# Patient Record
Sex: MALE | Race: WHITE | NOT HISPANIC OR LATINO | Employment: OTHER | URBAN - METROPOLITAN AREA
[De-identification: names, ages, dates, MRNs, and addresses within clinical notes are randomized per-mention and may not be internally consistent; named-entity substitution may affect disease eponyms.]

---

## 2019-04-11 ENCOUNTER — OFFICE VISIT (OUTPATIENT)
Dept: UROLOGY | Facility: CLINIC | Age: 79
End: 2019-04-11
Payer: MEDICARE

## 2019-04-11 ENCOUNTER — LAB VISIT (OUTPATIENT)
Dept: LAB | Facility: HOSPITAL | Age: 79
End: 2019-04-11
Attending: UROLOGY
Payer: MEDICARE

## 2019-04-11 VITALS
DIASTOLIC BLOOD PRESSURE: 77 MMHG | WEIGHT: 187.94 LBS | BODY MASS INDEX: 25.46 KG/M2 | HEART RATE: 83 BPM | TEMPERATURE: 98 F | HEIGHT: 72 IN | SYSTOLIC BLOOD PRESSURE: 132 MMHG

## 2019-04-11 DIAGNOSIS — N40.1 BPH WITH OBSTRUCTION/LOWER URINARY TRACT SYMPTOMS: Primary | ICD-10-CM

## 2019-04-11 DIAGNOSIS — Z87.442 HISTORY OF KIDNEY STONES: ICD-10-CM

## 2019-04-11 DIAGNOSIS — N40.1 BPH WITH OBSTRUCTION/LOWER URINARY TRACT SYMPTOMS: ICD-10-CM

## 2019-04-11 DIAGNOSIS — N13.8 BPH WITH OBSTRUCTION/LOWER URINARY TRACT SYMPTOMS: ICD-10-CM

## 2019-04-11 DIAGNOSIS — N13.8 BPH WITH OBSTRUCTION/LOWER URINARY TRACT SYMPTOMS: Primary | ICD-10-CM

## 2019-04-11 LAB
BILIRUB SERPL-MCNC: NEGATIVE MG/DL
BLOOD URINE, POC: ABNORMAL
COLOR, POC UA: ABNORMAL
COMPLEXED PSA SERPL-MCNC: 1.3 NG/ML (ref 0–4)
GLUCOSE UR QL STRIP: NEGATIVE
KETONES UR QL STRIP: NEGATIVE
LEUKOCYTE ESTERASE URINE, POC: ABNORMAL
NITRITE, POC UA: NEGATIVE
PH, POC UA: 7
PROTEIN, POC: NEGATIVE
SPECIFIC GRAVITY, POC UA: 1005
UROBILINOGEN, POC UA: NEGATIVE

## 2019-04-11 PROCEDURE — 81002 URINALYSIS NONAUTO W/O SCOPE: CPT | Mod: S$GLB,,, | Performed by: UROLOGY

## 2019-04-11 PROCEDURE — 99999 PR PBB SHADOW E&M-NEW PATIENT-LVL III: ICD-10-PCS | Mod: PBBFAC,,, | Performed by: UROLOGY

## 2019-04-11 PROCEDURE — 84153 ASSAY OF PSA TOTAL: CPT

## 2019-04-11 PROCEDURE — 99204 PR OFFICE/OUTPT VISIT, NEW, LEVL IV, 45-59 MIN: ICD-10-PCS | Mod: 25,S$GLB,, | Performed by: UROLOGY

## 2019-04-11 PROCEDURE — 36415 COLL VENOUS BLD VENIPUNCTURE: CPT

## 2019-04-11 PROCEDURE — 99204 OFFICE O/P NEW MOD 45 MIN: CPT | Mod: 25,S$GLB,, | Performed by: UROLOGY

## 2019-04-11 PROCEDURE — 1101F PT FALLS ASSESS-DOCD LE1/YR: CPT | Mod: CPTII,S$GLB,, | Performed by: UROLOGY

## 2019-04-11 PROCEDURE — 99999 PR PBB SHADOW E&M-NEW PATIENT-LVL III: CPT | Mod: PBBFAC,,, | Performed by: UROLOGY

## 2019-04-11 PROCEDURE — 1101F PR PT FALLS ASSESS DOC 0-1 FALLS W/OUT INJ PAST YR: ICD-10-PCS | Mod: CPTII,S$GLB,, | Performed by: UROLOGY

## 2019-04-11 PROCEDURE — 81002 POCT URINE DIPSTICK WITHOUT MICROSCOPE: ICD-10-PCS | Mod: S$GLB,,, | Performed by: UROLOGY

## 2019-04-11 RX ORDER — POTASSIUM CHLORIDE 750 MG/1
TABLET, EXTENDED RELEASE ORAL
Refills: 1 | COMMUNITY
Start: 2019-02-02 | End: 2022-10-19 | Stop reason: SDUPTHER

## 2019-04-11 RX ORDER — CARVEDILOL 3.12 MG/1
TABLET ORAL
Refills: 2 | COMMUNITY
Start: 2019-02-02 | End: 2022-10-19 | Stop reason: SDUPTHER

## 2019-04-11 RX ORDER — AMLODIPINE BESYLATE 5 MG/1
TABLET ORAL
Refills: 3 | COMMUNITY
Start: 2019-02-04

## 2019-04-11 RX ORDER — AMOXICILLIN 500 MG
CAPSULE ORAL DAILY
COMMUNITY

## 2019-04-11 RX ORDER — ASPIRIN 81 MG/1
TABLET ORAL
COMMUNITY

## 2019-04-11 RX ORDER — LISINOPRIL AND HYDROCHLOROTHIAZIDE 10; 12.5 MG/1; MG/1
TABLET ORAL
Refills: 4 | COMMUNITY
Start: 2019-03-25 | End: 2022-10-19 | Stop reason: SDUPTHER

## 2019-04-11 RX ORDER — GLUCOSAMINE/CHONDRO SU A 500-400 MG
1 TABLET ORAL 3 TIMES DAILY
COMMUNITY

## 2019-04-11 RX ORDER — SERTRALINE HYDROCHLORIDE 50 MG/1
TABLET, FILM COATED ORAL
Refills: 3 | COMMUNITY
Start: 2019-01-04 | End: 2022-10-19 | Stop reason: SDUPTHER

## 2019-04-11 RX ORDER — LOVASTATIN 20 MG/1
TABLET ORAL
Refills: 2 | COMMUNITY
Start: 2019-02-04 | End: 2022-10-19 | Stop reason: SDUPTHER

## 2019-04-11 NOTE — PROGRESS NOTES
Subjective:       Patient ID: Nino Mackay is a 79 y.o. male.    Chief Complaint:   DATE OF VISIT:  04/11/2019.    CHIEF COMPLAINT:  Recurrent urinary tract infection.    Mr. Mackay is a 79-year-old male who in the last few months has  experienced two urinary tract infections.  Once treated with Microbid with  recurrence and then he was put on Cipro.  Since then, the patient is  feeling better.  The patient has a significant past  history.  It looks  like in 2017, he underwent a transurethral resection of the prostate, was  complicated by severe bleeding.  He also has a previous history of kidney  stones.  At the present time, he is having significant lower urinary tract  symptoms with nocturia x3 to 6.  The flow is decreased.  Denies dysuria or  hematuria.  He feels that he cannot empty the bladder satisfactorily.  He  refers that after a TURP, he urinated well a couple of times, but never had  a significant change in the urine flow.  He feels in the distal part of the  penis is some type of obstruction that do not let the urine flow freely.    I did measure the postvoid residual urine and was found to be at 13 mL.    The patient's urinalysis today shows a trace of leukocytes, trace of  protein and trace of blood.  There is no PSA on record and he refers that  he does not have a PSA for quite some time.    The past medical history, the past surgical history, the current  medications and the allergies are well documented in the medical record and  all this was reviewed by me during this visit.    FAMILY HISTORY:  Negative for prostate cancer.        EOR/HN dd: 04/11/2019 13:02:54 (CDT)   td: 04/11/2019 15:10:11 (CDT)  Doc ID #[id number]   Job ID #[id number]    CC:            HPI  Review of Systems   Constitutional: Negative for activity change and appetite change.   HENT: Negative.    Eyes: Negative for discharge.   Respiratory: Negative for cough and shortness of breath.    Cardiovascular: Negative for  chest pain and palpitations.   Gastrointestinal: Negative for abdominal distention, abdominal pain, constipation and vomiting.   Genitourinary: Positive for decreased urine volume and difficulty urinating. Negative for discharge, dysuria, flank pain, frequency, hematuria, testicular pain and urgency.   Musculoskeletal: Negative for arthralgias.   Skin: Negative for rash.   Neurological: Negative for dizziness.   Psychiatric/Behavioral: The patient is not nervous/anxious.        Objective:      Physical Exam   Constitutional: He appears well-developed and well-nourished.   HENT:   Head: Normocephalic.   Eyes: Pupils are equal, round, and reactive to light.   Neck: Normal range of motion.   Cardiovascular: Normal rate.    Pulmonary/Chest: Effort normal.   Abdominal: Soft. He exhibits no distension and no mass. There is no tenderness.   Genitourinary: Rectum normal, prostate normal and penis normal. Rectal exam shows no external hemorrhoid, no mass and no tenderness. Prostate is not enlarged and not tender. Right testis shows no tenderness. Left testis shows no mass and no tenderness. No discharge found.       Musculoskeletal: Normal range of motion.   Neurological: He is alert.   Skin: Skin is warm.     Psychiatric: He has a normal mood and affect.       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 History of kidney stones        Plan:       BPH with obstruction/lower urinary tract symptoms  -     POCT URINE DIPSTICK WITHOUT MICROSCOPE  -     Prostate Specific Antigen, Diagnostic; Future; Expected date: 04/11/2019    History of kidney stones  -     X-Ray Abdomen AP 1 View; Future; Expected date: 04/11/2019    Patient to do a UFS in the next few days. He may need a cystoscopy. Await the KUB and PSA results.

## 2019-04-12 ENCOUNTER — HOSPITAL ENCOUNTER (OUTPATIENT)
Dept: RADIOLOGY | Facility: HOSPITAL | Age: 79
Discharge: HOME | End: 2019-04-12
Attending: UROLOGY
Payer: MEDICARE

## 2019-04-12 DIAGNOSIS — Z87.442 HISTORY OF KIDNEY STONES: ICD-10-CM

## 2019-04-12 DIAGNOSIS — R10.9 FLANK PAIN: ICD-10-CM

## 2019-04-12 PROCEDURE — 74018 RADEX ABDOMEN 1 VIEW: CPT | Mod: TC,PN

## 2019-04-12 PROCEDURE — 74018 RADEX ABDOMEN 1 VIEW: CPT | Mod: 26,,, | Performed by: RADIOLOGY

## 2019-04-12 PROCEDURE — 74018 XR ABDOMEN AP 1 VIEW: ICD-10-PCS | Mod: 26,,, | Performed by: RADIOLOGY

## 2019-04-17 ENCOUNTER — TELEPHONE (OUTPATIENT)
Dept: UROLOGY | Facility: CLINIC | Age: 79
End: 2019-04-17

## 2019-04-17 ENCOUNTER — HOSPITAL ENCOUNTER (OUTPATIENT)
Dept: RADIOLOGY | Facility: HOSPITAL | Age: 79
Discharge: HOME | End: 2019-04-17
Attending: UROLOGY
Payer: MEDICARE

## 2019-04-17 DIAGNOSIS — R10.9 FLANK PAIN: ICD-10-CM

## 2019-04-17 PROCEDURE — 74176 CT ABDOMEN PELVIS WITHOUT CONTRAST: ICD-10-PCS | Mod: 26,,, | Performed by: RADIOLOGY

## 2019-04-17 PROCEDURE — 74176 CT ABD & PELVIS W/O CONTRAST: CPT | Mod: TC,PN

## 2019-04-17 PROCEDURE — 74176 CT ABD & PELVIS W/O CONTRAST: CPT | Mod: 26,,, | Performed by: RADIOLOGY

## 2019-04-17 NOTE — TELEPHONE ENCOUNTER
Confirmed appt for 11 tomorrow for uroflow study with the pt. Instructed him to come with a full bladder. PT verbalized understanding

## 2019-04-17 NOTE — TELEPHONE ENCOUNTER
----- Message from Henry Vazquez sent at 4/17/2019  2:56 PM CDT -----  Contact: Patient  Type: Needs Medical Advice    Who Called:  Patient  Best Call Back Number: [phone]  Additional Information: Patient would like to discuss upcoming appointment. Please call to advise. Thanks!

## 2019-04-18 ENCOUNTER — CLINICAL SUPPORT (OUTPATIENT)
Dept: UROLOGY | Facility: CLINIC | Age: 79
End: 2019-04-18
Payer: MEDICARE

## 2019-04-18 DIAGNOSIS — N40.1 BPH WITH OBSTRUCTION/LOWER URINARY TRACT SYMPTOMS: Primary | ICD-10-CM

## 2019-04-18 DIAGNOSIS — N13.8 BPH WITH OBSTRUCTION/LOWER URINARY TRACT SYMPTOMS: Primary | ICD-10-CM

## 2019-04-18 NOTE — PROGRESS NOTES
Instructed pt to urinate into uroflow machine. Informed him we will call him with results. Pt verbalized understanding.

## 2019-05-03 ENCOUNTER — OFFICE VISIT (OUTPATIENT)
Dept: UROLOGY | Facility: CLINIC | Age: 79
End: 2019-05-03
Payer: MEDICARE

## 2019-05-03 VITALS
DIASTOLIC BLOOD PRESSURE: 82 MMHG | RESPIRATION RATE: 16 BRPM | TEMPERATURE: 98 F | HEIGHT: 72 IN | HEART RATE: 64 BPM | SYSTOLIC BLOOD PRESSURE: 134 MMHG | WEIGHT: 186.31 LBS | BODY MASS INDEX: 25.24 KG/M2

## 2019-05-03 DIAGNOSIS — N20.0 KIDNEY STONE ON LEFT SIDE: ICD-10-CM

## 2019-05-03 PROCEDURE — 99214 OFFICE O/P EST MOD 30 MIN: CPT | Mod: S$GLB,,, | Performed by: UROLOGY

## 2019-05-03 PROCEDURE — 1101F PR PT FALLS ASSESS DOC 0-1 FALLS W/OUT INJ PAST YR: ICD-10-PCS | Mod: CPTII,S$GLB,, | Performed by: UROLOGY

## 2019-05-03 PROCEDURE — 99999 PR PBB SHADOW E&M-EST. PATIENT-LVL IV: CPT | Mod: PBBFAC,,, | Performed by: UROLOGY

## 2019-05-03 PROCEDURE — 1101F PT FALLS ASSESS-DOCD LE1/YR: CPT | Mod: CPTII,S$GLB,, | Performed by: UROLOGY

## 2019-05-03 PROCEDURE — 99214 PR OFFICE/OUTPT VISIT, EST, LEVL IV, 30-39 MIN: ICD-10-PCS | Mod: S$GLB,,, | Performed by: UROLOGY

## 2019-05-03 PROCEDURE — 99999 PR PBB SHADOW E&M-EST. PATIENT-LVL IV: ICD-10-PCS | Mod: PBBFAC,,, | Performed by: UROLOGY

## 2019-05-06 PROBLEM — N20.0 KIDNEY STONE ON LEFT SIDE: Status: ACTIVE | Noted: 2019-05-06

## 2019-05-06 NOTE — PROGRESS NOTES
Subjective:       Patient ID: Nino Mackay is a 79 y.o. male.    Chief Complaint:   DATE OF VISIT:  05/03/2019    CHIEF COMPLAINT:  History of recurrent urinary tract infection and left  kidney stone.    Mr. Mackay was initially seen on 04/11/2019 with a history of recurrent  urinary tract infections.  At that time, the patient was recommended to  undergo AP of the abdomen.  X-ray that shows a stone, then he underwent a  CT scan with no contrast that again shows a stone in the left kidney of  approximately 9 mm in the maximum diameter.  The patient is here for  discussion of possible treatment of the stone, condition that he is  experiencing.  Despite this, the patient is not having any pain.  This can  be the source of the recurrent urinary tract infections and the patient is  interested in undergoing a treatment for the condition.  I went ahead and  suggested that we proceed with an ESWL.  The stone is not significantly  dense, can be seen in the AP KUB, is seen better in the CAT scan.  I  explained to the patient that we have a little difficulty in the  localization of the stone, but with the AP and oblique views that we used  during lithotripsy, we should be able to localize the stone and treat him  properly.  The rationale, the risk, and possible complications of the  treatment have been fully explained to the patient and he agreed for it.   All the questions were answered to his satisfaction and he has been  scheduled to undergo this procedure on 05/22/2019.  It is to be noted that  today's urinalysis is completely clear.  The last PSA on 04/11/2019 was  1.3.  Written information on lithotripsy has been provided to the patient  today.        EOR/HN dd: 05/06/2019 09:59:59 (CDT)   td: 05/06/2019 21:36:43 (CDT)  Doc ID #[id number]   Job ID #[id number]    CC:            HPI  Review of Systems   Constitutional: Negative for activity change and appetite change.   HENT: Negative.    Eyes: Negative for  discharge.   Respiratory: Negative for cough and shortness of breath.    Cardiovascular: Negative for chest pain and palpitations.   Gastrointestinal: Negative for abdominal distention, abdominal pain, constipation and vomiting.   Genitourinary: Positive for frequency. Negative for discharge, dysuria, flank pain, hematuria, testicular pain and urgency.   Musculoskeletal: Positive for arthralgias.   Skin: Negative for rash.   Neurological: Negative for dizziness.   Psychiatric/Behavioral: The patient is not nervous/anxious.        Objective:      Physical Exam   Constitutional: He appears well-developed and well-nourished.   HENT:   Head: Normocephalic.   Eyes: Pupils are equal, round, and reactive to light.   Neck: Normal range of motion.   Cardiovascular: Normal rate.    Pulmonary/Chest: Effort normal.   Abdominal: Soft. He exhibits no distension and no mass. There is no tenderness.   Genitourinary: Penis normal. Right testis shows no mass and no tenderness. Left testis shows no mass and no tenderness. No discharge found.   Musculoskeletal: Normal range of motion.   Neurological: He is alert.   Skin: Skin is warm.     Psychiatric: He has a normal mood and affect.       Assessment:       1. Kidney stone on left side        Plan:       Kidney stone on left side  -     Case Request Operating Room: LITHOTRIPSY, ESWL    For left ESWL on 5/22/19/ 9 mm stone left kidney

## 2019-05-10 ENCOUNTER — TELEPHONE (OUTPATIENT)
Dept: UROLOGY | Facility: CLINIC | Age: 79
End: 2019-05-10

## 2019-05-20 ENCOUNTER — HOSPITAL ENCOUNTER (OUTPATIENT)
Dept: PREADMISSION TESTING | Facility: HOSPITAL | Age: 79
Discharge: HOME | End: 2019-05-20
Attending: UROLOGY
Payer: MEDICARE

## 2019-05-27 ENCOUNTER — ANESTHESIA EVENT (OUTPATIENT)
Dept: SURGERY | Facility: HOSPITAL | Age: 79
End: 2019-05-27
Payer: MEDICARE

## 2019-05-27 ENCOUNTER — HOSPITAL ENCOUNTER (OUTPATIENT)
Dept: PREADMISSION TESTING | Facility: HOSPITAL | Age: 79
Discharge: HOME | End: 2019-05-27
Attending: UROLOGY
Payer: MEDICARE

## 2019-05-27 PROCEDURE — 99900103 DSU ONLY-NO CHARGE-INITIAL HR (STAT)

## 2019-05-27 NOTE — ANESTHESIA PREPROCEDURE EVALUATION
05/27/2019  Nino Mackay is a 79 y.o., male.    Pre-op Assessment    I have reviewed the Patient Summary Reports.    I have reviewed the Nursing Notes.   I have reviewed the Medications.     Review of Systems  Anesthesia Hx:  No problems with previous Anesthesia  Neg history of prior surgery. Denies Family Hx of Anesthesia complications.   Denies Personal Hx of Anesthesia complications.   Social:  Smoker    Hematology/Oncology:  Hematology Normal   Oncology Normal     EENT/Dental:EENT/Dental Normal   Cardiovascular:   Hypertension, well controlled    Pulmonary:  Pulmonary Normal    Renal/:   renal calculi    Hepatic/GI:  Hepatic/GI Normal    Musculoskeletal:  Musculoskeletal Normal    Neurological:  Neurology Normal    Endocrine:  Endocrine Normal    Dermatological:  Skin Normal    Psych:  Psychiatric Normal           Physical Exam  General:  Well nourished    Airway/Jaw/Neck:  AIRWAY FINDINGS: Normal      Eyes/Ears/Nose:  EYES/EARS/NOSE FINDINGS: Normal   Dental:  DENTAL FINDINGS: Normal   Chest/Lungs:  Chest/Lungs Clear    Heart/Vascular:  Heart Findings: Normal Heart murmur: negative Vascular Findings: Normal    Abdomen:  Abdomen Findings: Normal    Musculoskeletal:  Musculoskeletal Findings: Normal   Skin:  Skin Findings: Normal         Anesthesia Plan  Type of Anesthesia, risks & benefits discussed:  Anesthesia Type:  general  Patient's Preference:   Intra-op Monitoring Plan: standard ASA monitors  Intra-op Monitoring Plan Comments:   Post Op Pain Control Plan:   Post Op Pain Control Plan Comments:   Induction:   IV  Beta Blocker:  Patient is not currently on a Beta-Blocker (No further documentation required).       Informed Consent: Patient understands risks and agrees with Anesthesia plan.  Questions answered. Anesthesia consent signed with patient.  ASA Score: 2     Day of Surgery Review  of History & Physical:    H&P update referred to the provider.

## 2019-05-29 ENCOUNTER — ANESTHESIA (OUTPATIENT)
Dept: SURGERY | Facility: HOSPITAL | Age: 79
End: 2019-05-29
Payer: MEDICARE

## 2019-05-29 ENCOUNTER — HOSPITAL ENCOUNTER (OUTPATIENT)
Facility: HOSPITAL | Age: 79
Discharge: HOME | End: 2019-05-29
Attending: UROLOGY | Admitting: UROLOGY
Payer: MEDICARE

## 2019-05-29 ENCOUNTER — HOSPITAL ENCOUNTER (OUTPATIENT)
Dept: RADIOLOGY | Facility: HOSPITAL | Age: 79
Discharge: HOME | End: 2019-05-29
Attending: UROLOGY | Admitting: UROLOGY
Payer: MEDICARE

## 2019-05-29 VITALS
TEMPERATURE: 98 F | DIASTOLIC BLOOD PRESSURE: 75 MMHG | RESPIRATION RATE: 13 BRPM | HEART RATE: 72 BPM | SYSTOLIC BLOOD PRESSURE: 136 MMHG | HEIGHT: 72 IN | OXYGEN SATURATION: 96 % | WEIGHT: 186 LBS | BODY MASS INDEX: 25.19 KG/M2

## 2019-05-29 DIAGNOSIS — N20.0 KIDNEY STONE ON LEFT SIDE: ICD-10-CM

## 2019-05-29 DIAGNOSIS — Z01.818 PRE-OP EXAM: ICD-10-CM

## 2019-05-29 LAB
ANION GAP SERPL CALC-SCNC: 9 MMOL/L (ref 8–16)
BASOPHILS # BLD AUTO: 0.06 K/UL (ref 0–0.2)
BASOPHILS NFR BLD: 0.8 % (ref 0–1.9)
BUN SERPL-MCNC: 19 MG/DL (ref 8–23)
CALCIUM SERPL-MCNC: 8.5 MG/DL (ref 8.7–10.5)
CHLORIDE SERPL-SCNC: 102 MMOL/L (ref 95–110)
CO2 SERPL-SCNC: 27 MMOL/L (ref 23–29)
CREAT SERPL-MCNC: 1.2 MG/DL (ref 0.5–1.4)
DIFFERENTIAL METHOD: ABNORMAL
EOSINOPHIL # BLD AUTO: 0.2 K/UL (ref 0–0.5)
EOSINOPHIL NFR BLD: 3.3 % (ref 0–8)
ERYTHROCYTE [DISTWIDTH] IN BLOOD BY AUTOMATED COUNT: 12.4 % (ref 11.5–14.5)
EST. GFR  (AFRICAN AMERICAN): >60 ML/MIN/1.73 M^2
EST. GFR  (NON AFRICAN AMERICAN): 57.2 ML/MIN/1.73 M^2
GLUCOSE SERPL-MCNC: 124 MG/DL (ref 70–110)
HCT VFR BLD AUTO: 41.2 % (ref 40–54)
HGB BLD-MCNC: 13.7 G/DL (ref 14–18)
IMM GRANULOCYTES # BLD AUTO: 0.02 K/UL (ref 0–0.04)
IMM GRANULOCYTES NFR BLD AUTO: 0.3 % (ref 0–0.5)
LYMPHOCYTES # BLD AUTO: 2.1 K/UL (ref 1–4.8)
LYMPHOCYTES NFR BLD: 28.6 % (ref 18–48)
MCH RBC QN AUTO: 31.6 PG (ref 27–31)
MCHC RBC AUTO-ENTMCNC: 33.3 G/DL (ref 32–36)
MCV RBC AUTO: 95 FL (ref 82–98)
MONOCYTES # BLD AUTO: 0.8 K/UL (ref 0.3–1)
MONOCYTES NFR BLD: 10.9 % (ref 4–15)
NEUTROPHILS # BLD AUTO: 4.1 K/UL (ref 1.8–7.7)
NEUTROPHILS NFR BLD: 56.1 % (ref 38–73)
NRBC BLD-RTO: 0 /100 WBC
PLATELET # BLD AUTO: 288 K/UL (ref 150–350)
PMV BLD AUTO: 8.2 FL (ref 9.2–12.9)
POTASSIUM SERPL-SCNC: 3.9 MMOL/L (ref 3.5–5.1)
RBC # BLD AUTO: 4.34 M/UL (ref 4.6–6.2)
SODIUM SERPL-SCNC: 138 MMOL/L (ref 136–145)
WBC # BLD AUTO: 7.31 K/UL (ref 3.9–12.7)

## 2019-05-29 PROCEDURE — D9220A PRA ANESTHESIA: ICD-10-PCS | Mod: ANES,,, | Performed by: ANESTHESIOLOGY

## 2019-05-29 PROCEDURE — 71000033 HC RECOVERY, INTIAL HOUR: Performed by: UROLOGY

## 2019-05-29 PROCEDURE — 74018 XR ABDOMEN AP 1 VIEW: ICD-10-PCS | Mod: 26,,, | Performed by: RADIOLOGY

## 2019-05-29 PROCEDURE — 25000003 PHARM REV CODE 250: Performed by: NURSE ANESTHETIST, CERTIFIED REGISTERED

## 2019-05-29 PROCEDURE — 93005 ELECTROCARDIOGRAM TRACING: CPT

## 2019-05-29 PROCEDURE — 71000015 HC POSTOP RECOV 1ST HR: Performed by: UROLOGY

## 2019-05-29 PROCEDURE — 80048 BASIC METABOLIC PNL TOTAL CA: CPT

## 2019-05-29 PROCEDURE — 50590 FRAGMENTING OF KIDNEY STONE: CPT | Mod: LT,,, | Performed by: UROLOGY

## 2019-05-29 PROCEDURE — 74018 RADEX ABDOMEN 1 VIEW: CPT | Mod: TC,FY

## 2019-05-29 PROCEDURE — 36415 COLL VENOUS BLD VENIPUNCTURE: CPT

## 2019-05-29 PROCEDURE — 37000009 HC ANESTHESIA EA ADD 15 MINS: Performed by: UROLOGY

## 2019-05-29 PROCEDURE — 37000008 HC ANESTHESIA 1ST 15 MINUTES: Performed by: UROLOGY

## 2019-05-29 PROCEDURE — D9220A PRA ANESTHESIA: Mod: CRNA,,, | Performed by: NURSE ANESTHETIST, CERTIFIED REGISTERED

## 2019-05-29 PROCEDURE — 74018 RADEX ABDOMEN 1 VIEW: CPT | Mod: 26,,, | Performed by: RADIOLOGY

## 2019-05-29 PROCEDURE — D9220A PRA ANESTHESIA: Mod: ANES,,, | Performed by: ANESTHESIOLOGY

## 2019-05-29 PROCEDURE — 85025 COMPLETE CBC W/AUTO DIFF WBC: CPT

## 2019-05-29 PROCEDURE — 36000704 HC OR TIME LEV I 1ST 15 MIN: Performed by: UROLOGY

## 2019-05-29 PROCEDURE — D9220A PRA ANESTHESIA: ICD-10-PCS | Mod: CRNA,,, | Performed by: NURSE ANESTHETIST, CERTIFIED REGISTERED

## 2019-05-29 PROCEDURE — 36000705 HC OR TIME LEV I EA ADD 15 MIN: Performed by: UROLOGY

## 2019-05-29 PROCEDURE — 25000003 PHARM REV CODE 250: Performed by: ANESTHESIOLOGY

## 2019-05-29 PROCEDURE — 50590 PR FRAGMENT KIDNEY STONE/ ESWL: ICD-10-PCS | Mod: LT,,, | Performed by: UROLOGY

## 2019-05-29 PROCEDURE — 63600175 PHARM REV CODE 636 W HCPCS: Performed by: NURSE ANESTHETIST, CERTIFIED REGISTERED

## 2019-05-29 PROCEDURE — 63600175 PHARM REV CODE 636 W HCPCS: Performed by: UROLOGY

## 2019-05-29 RX ORDER — FENTANYL CITRATE 50 UG/ML
INJECTION, SOLUTION INTRAMUSCULAR; INTRAVENOUS
Status: DISCONTINUED | OUTPATIENT
Start: 2019-05-29 | End: 2019-05-29

## 2019-05-29 RX ORDER — CEPHALEXIN 500 MG/1
500 CAPSULE ORAL EVERY 12 HOURS
Qty: 10 CAPSULE | Refills: 0 | Status: SHIPPED | OUTPATIENT
Start: 2019-05-29 | End: 2019-06-03

## 2019-05-29 RX ORDER — SODIUM CHLORIDE, SODIUM LACTATE, POTASSIUM CHLORIDE, CALCIUM CHLORIDE 600; 310; 30; 20 MG/100ML; MG/100ML; MG/100ML; MG/100ML
INJECTION, SOLUTION INTRAVENOUS CONTINUOUS
Status: DISCONTINUED | OUTPATIENT
Start: 2019-05-29 | End: 2019-05-29 | Stop reason: HOSPADM

## 2019-05-29 RX ORDER — OXYCODONE AND ACETAMINOPHEN 5; 325 MG/1; MG/1
1 TABLET ORAL
Status: DISCONTINUED | OUTPATIENT
Start: 2019-05-29 | End: 2019-05-29 | Stop reason: HOSPADM

## 2019-05-29 RX ORDER — ONDANSETRON 2 MG/ML
4 INJECTION INTRAMUSCULAR; INTRAVENOUS DAILY PRN
Status: DISCONTINUED | OUTPATIENT
Start: 2019-05-29 | End: 2019-05-29 | Stop reason: HOSPADM

## 2019-05-29 RX ORDER — CEFAZOLIN SODIUM 2 G/50ML
2 SOLUTION INTRAVENOUS
Status: COMPLETED | OUTPATIENT
Start: 2019-05-29 | End: 2019-05-29

## 2019-05-29 RX ORDER — KETOROLAC TROMETHAMINE 30 MG/ML
15 INJECTION, SOLUTION INTRAMUSCULAR; INTRAVENOUS ONCE
Status: DISCONTINUED | OUTPATIENT
Start: 2019-05-29 | End: 2019-05-29 | Stop reason: HOSPADM

## 2019-05-29 RX ORDER — MIDAZOLAM HYDROCHLORIDE 1 MG/ML
INJECTION, SOLUTION INTRAMUSCULAR; INTRAVENOUS
Status: DISCONTINUED | OUTPATIENT
Start: 2019-05-29 | End: 2019-05-29

## 2019-05-29 RX ORDER — SODIUM CHLORIDE, SODIUM LACTATE, POTASSIUM CHLORIDE, CALCIUM CHLORIDE 600; 310; 30; 20 MG/100ML; MG/100ML; MG/100ML; MG/100ML
INJECTION, SOLUTION INTRAVENOUS CONTINUOUS PRN
Status: DISCONTINUED | OUTPATIENT
Start: 2019-05-29 | End: 2019-05-29

## 2019-05-29 RX ORDER — MORPHINE SULFATE 4 MG/ML
2 INJECTION, SOLUTION INTRAMUSCULAR; INTRAVENOUS EVERY 5 MIN PRN
Status: DISCONTINUED | OUTPATIENT
Start: 2019-05-29 | End: 2019-05-29 | Stop reason: HOSPADM

## 2019-05-29 RX ORDER — TRAMADOL HYDROCHLORIDE AND ACETAMINOPHEN 37.5; 325 MG/1; MG/1
1 TABLET, FILM COATED ORAL EVERY 6 HOURS PRN
Qty: 15 TABLET | Refills: 0 | Status: SHIPPED | OUTPATIENT
Start: 2019-05-29 | End: 2022-10-19

## 2019-05-29 RX ORDER — PROPOFOL 10 MG/ML
VIAL (ML) INTRAVENOUS
Status: DISCONTINUED | OUTPATIENT
Start: 2019-05-29 | End: 2019-05-29

## 2019-05-29 RX ORDER — EPHEDRINE SULFATE 50 MG/ML
INJECTION, SOLUTION INTRAVENOUS
Status: DISCONTINUED | OUTPATIENT
Start: 2019-05-29 | End: 2019-05-29

## 2019-05-29 RX ORDER — GLYCOPYRROLATE 0.2 MG/ML
INJECTION INTRAMUSCULAR; INTRAVENOUS
Status: DISCONTINUED | OUTPATIENT
Start: 2019-05-29 | End: 2019-05-29

## 2019-05-29 RX ORDER — SODIUM CHLORIDE, SODIUM LACTATE, POTASSIUM CHLORIDE, CALCIUM CHLORIDE 600; 310; 30; 20 MG/100ML; MG/100ML; MG/100ML; MG/100ML
125 INJECTION, SOLUTION INTRAVENOUS CONTINUOUS
Status: DISCONTINUED | OUTPATIENT
Start: 2019-05-29 | End: 2019-05-29 | Stop reason: HOSPADM

## 2019-05-29 RX ORDER — LIDOCAINE HYDROCHLORIDE 10 MG/ML
1 INJECTION, SOLUTION EPIDURAL; INFILTRATION; INTRACAUDAL; PERINEURAL ONCE
Status: DISCONTINUED | OUTPATIENT
Start: 2019-05-29 | End: 2019-05-29 | Stop reason: HOSPADM

## 2019-05-29 RX ORDER — ONDANSETRON 2 MG/ML
INJECTION INTRAMUSCULAR; INTRAVENOUS
Status: DISCONTINUED | OUTPATIENT
Start: 2019-05-29 | End: 2019-05-29

## 2019-05-29 RX ORDER — TAMSULOSIN HYDROCHLORIDE 0.4 MG/1
0.4 CAPSULE ORAL DAILY
Qty: 30 CAPSULE | Refills: 11 | Status: SHIPPED | OUTPATIENT
Start: 2019-05-29 | End: 2020-06-01

## 2019-05-29 RX ADMIN — SODIUM CHLORIDE, POTASSIUM CHLORIDE, SODIUM LACTATE AND CALCIUM CHLORIDE: 600; 310; 30; 20 INJECTION, SOLUTION INTRAVENOUS at 07:05

## 2019-05-29 RX ADMIN — GLYCOPYRROLATE 0.2 MG: 0.2 INJECTION INTRAMUSCULAR; INTRAVENOUS at 07:05

## 2019-05-29 RX ADMIN — PROPOFOL 200 MG: 10 INJECTION, EMULSION INTRAVENOUS at 07:05

## 2019-05-29 RX ADMIN — MIDAZOLAM HYDROCHLORIDE 2 MG: 1 INJECTION, SOLUTION INTRAMUSCULAR; INTRAVENOUS at 07:05

## 2019-05-29 RX ADMIN — EPHEDRINE SULFATE 10 MG: 50 INJECTION INTRAMUSCULAR; INTRAVENOUS; SUBCUTANEOUS at 07:05

## 2019-05-29 RX ADMIN — EPHEDRINE SULFATE 15 MG: 50 INJECTION INTRAMUSCULAR; INTRAVENOUS; SUBCUTANEOUS at 07:05

## 2019-05-29 RX ADMIN — EPHEDRINE SULFATE 25 MG: 50 INJECTION INTRAMUSCULAR; INTRAVENOUS; SUBCUTANEOUS at 07:05

## 2019-05-29 RX ADMIN — ONDANSETRON 4 MG: 2 INJECTION INTRAMUSCULAR; INTRAVENOUS at 07:05

## 2019-05-29 RX ADMIN — CEFAZOLIN SODIUM 2 G: 2 SOLUTION INTRAVENOUS at 07:05

## 2019-05-29 RX ADMIN — FENTANYL CITRATE 50 MCG: 50 INJECTION, SOLUTION INTRAMUSCULAR; INTRAVENOUS at 07:05

## 2019-05-29 NOTE — TRANSFER OF CARE
Anesthesia Transfer of Care Note    Patient: Nino Mackay    Procedure(s) Performed: Procedure(s) (LRB):  LITHOTRIPSY, ESWL (Left)    Patient location: PACU    Anesthesia Type: general    Transport from OR: Transported from OR on room air with adequate spontaneous ventilation    Post pain: adequate analgesia    Post assessment: no apparent anesthetic complications and tolerated procedure well    Post vital signs: stable    Level of consciousness: sedated and responds to stimulation    Nausea/Vomiting: no nausea/vomiting    Complications: none    Transfer of care protocol was followed      Last vitals:   Visit Vitals  /72 (BP Location: Left arm, Patient Position: Lying)   Pulse 70   Temp 36.7 °C (98.1 °F) (Oral)   Resp 18   Ht 6' (1.829 m)   Wt 84.4 kg (186 lb)   SpO2 97%   BMI 25.23 kg/m²

## 2019-05-29 NOTE — OP NOTE
Operative  Note    DATE OF PROCEDURE: 5/29/2019    SURGEON: Kobi Troncoso M.D.     PREOPERATIVE DIAGNOSIS: Urolithiasis    POSTOPERATIVE DIAGNOSIS: same     PROCEDURE: Extracorporeal shockwave lithotripsy    ANESTHESIA: General     COMPLICATIONS: None.     ESTIMATED BLOOD LOSS: Minimal.     SPECIMENS: None.     DRAINS: None.     INDICATIONS:  Nino Mackay is a  79 y.o.  male with urolithiasis and he is here for treatment of that condition. A decision was made to go ahead and   proceed with operative intervention. Risks and complications including rate of   recurrence discussed in advance.     PROCEDURE IN DETAIL:    The patient was brought to the lithotripsy treatment room and placed on the Tepha treatment table where the patient'sLeftflank was over the water bag. After adequate induction of general anesthesia, plain and AP fluoroscopic images were obtained of the Leftrenal area and 9 mm calculus was visualized over the level of the Left kidney.The calculus was positioned at the F2 point. Shock waves were administered at the rate of 120 shocks per minute using the automatic pacing mechanism. This was initially started at 16 kV and then increased to 26 kV and this was tolerated well throughout the procedure. Periodic repeat fluoroscopic images both AP and oblique were obtained of the Left kidney and when necessary, repositioning was carried out. The repeat fluoroscopic images did reveal satisfactory fragmentation of the calculus. A total of 3000 shocks were administered. Having tolerated the procedure well he was transferred to the recovery room in stable condition.

## 2019-05-29 NOTE — H&P
CHIEF COMPLAINT:  History of recurrent urinary tract infection and left  kidney stone.     Mr. Mackay was initially seen on 04/11/2019 with a history of recurrent  urinary tract infections.  At that time, the patient was recommended to  undergo AP of the abdomen.  X-ray that shows a stone, then he underwent a  CT scan with no contrast that again shows a stone in the left kidney of  approximately 9 mm in the maximum diameter.  The patient is here for  discussion of possible treatment of the stone, condition that he is  experiencing.  Despite this, the patient is not having any pain.  This can  be the source of the recurrent urinary tract infections and the patient is  interested in undergoing a treatment for the condition.  I went ahead and  suggested that we proceed with an ESWL.  The stone is not significantly  dense, can be seen in the AP KUB, is seen better in the CAT scan.  I  explained to the patient that we have a little difficulty in the  localization of the stone, but with the AP and oblique views that we used  during lithotripsy, we should be able to localize the stone and treat him  properly.  The rationale, the risk, and possible complications of the  treatment have been fully explained to the patient and he agreed for it.   All the questions were answered to his satisfaction and he has been  scheduled to undergo this procedure on 05/22/2019.  It is to be noted that  today's urinalysis is completely clear.  The last PSA on 04/11/2019 was  1.3.  Written information on lithotripsy has been provided to the patient  today.             Review of Systems   Constitutional: Negative for activity change and appetite change.   HENT: Negative.    Eyes: Negative for discharge.   Respiratory: Negative for cough and shortness of breath.    Cardiovascular: Negative for chest pain and palpitations.   Gastrointestinal: Negative for abdominal distention, abdominal pain, constipation and vomiting.   Genitourinary:  Positive for frequency. Negative for discharge, dysuria, flank pain, hematuria, testicular pain and urgency.   Musculoskeletal: Positive for arthralgias.   Skin: Negative for rash.   Neurological: Negative for dizziness.   Psychiatric/Behavioral: The patient is not nervous/anxious.        Objective:   Physical Exam   Constitutional: He appears well-developed and well-nourished.   HENT:   Head: Normocephalic.   Eyes: Pupils are equal, round, and reactive to light.   Neck: Normal range of motion.   Cardiovascular: Normal rate.    Pulmonary/Chest: Effort normal.   Abdominal: Soft. He exhibits no distension and no mass. There is no tenderness.   Genitourinary: Penis normal. Right testis shows no mass and no tenderness. Left testis shows no mass and no tenderness. No discharge found.   Musculoskeletal: Normal range of motion.   Neurological: He is alert.   Skin: Skin is warm.     Psychiatric: He has a normal mood and affect.       Assessment:       1. Kidney stone on left side        Plan:       Kidney stone on left side  Left ESWL

## 2019-05-29 NOTE — BRIEF OP NOTE
Brief Operative Note     Surgery Date: 5/29/2019    Surgeon:   Kobi Troncoso MD     Pre-op Diagnosis:  Kidney stone on left side [N20.0]  Kidney stone on left side [N20.0]    Post-op Diagnosis:  Same    Procedure:  Procedure(s) (LRB):  LITHOTRIPSY, ESWL (Left)    Anesthesia: General    Findings/Key Components:  9 mm stone on the left    Estimated Blood Loss: Minimal                                                                                                                           Discharge Summary    Admit Date: 5/29/2019     Attending Physician: Kobi Troncoso MD     Discharge Physician: Kobi Troncoso MD      Discharge Date: 5/29/2019    Treatments/Procedures: Procedure(s) (LRB):  LITHOTRIPSY, ESWL (Left)  Final Diagnosis:Left kidney stone  Hospital Course: Left ESWL done as planned  F.ULuisito Troncoso 2 weeks. Start on Flomax    Disposition: Home or Self Care     Patient Instructions:     Current Discharge Medication List:         Nino Mackay   Home Medication Instructions HIGINIO:[id number]    Printed on:05/29/19 7075   Medication Information                      amLODIPine (NORVASC) 5 MG tablet  TK 1 T PO D             aspirin (ECOTRIN) 81 MG EC tablet  aspirin 81 mg tablet,delayed release   Take 1 tablet every day by oral route.             carvedilol (COREG) 3.125 MG tablet  TK 1 T PO D             cephALEXin (KEFLEX) 500 MG capsule  Take 1 capsule (500 mg total) by mouth every 12 (twelve) hours. for 10 doses             fish oil-omega-3 fatty acids 300-1,000 mg capsule  Take by mouth once daily.             glucosamine-chondroitin 500-400 mg tablet  Take 1 tablet by mouth 3 (three) times daily.             lisinopril-hydrochlorothiazide (PRINZIDE,ZESTORETIC) 10-12.5 mg per tablet               lovastatin (MEVACOR) 20 MG tablet  TK 1 T PO HS             potassium chloride (KLOR-CON) 10 MEQ TbSR  TK 2 TS PO BID             sertraline (ZOLOFT) 50 MG tablet  TK 1 T PO D              tamsulosin (FLOMAX) 0.4 mg Cap  Take 1 capsule (0.4 mg total) by mouth once daily.             tramadol-acetaminophen 37.5-325 mg (ULTRACET) 37.5-325 mg Tab  Take 1 tablet by mouth every 6 (six) hours as needed.                     Current Discharge Medication List      START taking these medications    Details   cephALEXin (KEFLEX) 500 MG capsule Take 1 capsule (500 mg total) by mouth every 12 (twelve) hours. for 10 doses  Qty: 10 capsule, Refills: 0      tamsulosin (FLOMAX) 0.4 mg Cap Take 1 capsule (0.4 mg total) by mouth once daily.  Qty: 30 capsule, Refills: 11      tramadol-acetaminophen 37.5-325 mg (ULTRACET) 37.5-325 mg Tab Take 1 tablet by mouth every 6 (six) hours as needed.  Qty: 15 tablet, Refills: 0         CONTINUE these medications which have NOT CHANGED    Details   amLODIPine (NORVASC) 5 MG tablet TK 1 T PO D  Refills: 3      carvedilol (COREG) 3.125 MG tablet TK 1 T PO D  Refills: 2      fish oil-omega-3 fatty acids 300-1,000 mg capsule Take by mouth once daily.      glucosamine-chondroitin 500-400 mg tablet Take 1 tablet by mouth 3 (three) times daily.      lisinopril-hydrochlorothiazide (PRINZIDE,ZESTORETIC) 10-12.5 mg per tablet Refills: 4      lovastatin (MEVACOR) 20 MG tablet TK 1 T PO HS  Refills: 2      potassium chloride (KLOR-CON) 10 MEQ TbSR TK 2 TS PO BID  Refills: 1      sertraline (ZOLOFT) 50 MG tablet TK 1 T PO D  Refills: 3      aspirin (ECOTRIN) 81 MG EC tablet aspirin 81 mg tablet,delayed release   Take 1 tablet every day by oral route.             Discharge Procedure Orders:    Discharge Procedure Orders   Diet Adult Regular     Activity as tolerated

## 2019-05-30 NOTE — ANESTHESIA POSTPROCEDURE EVALUATION
Anesthesia Post Evaluation    Patient: Nino Mackay    Procedure(s) Performed: Procedure(s) (LRB):  LITHOTRIPSY, ESWL (Left)    Final Anesthesia Type: general  Patient location during evaluation: PACU  Patient participation: Yes- Able to Participate  Level of consciousness: awake and awake and alert  Post-procedure vital signs: reviewed and stable  Pain management: adequate  Airway patency: patent  PONV status at discharge: No PONV  Anesthetic complications: no      Cardiovascular status: blood pressure returned to baseline  Respiratory status: unassisted and spontaneous ventilation  Hydration status: euvolemic  Follow-up not needed.          Vitals Value Taken Time   /75 5/29/2019  9:15 AM   Temp 36.7 °C (98.1 °F) 5/29/2019  6:44 AM   Pulse 72 5/29/2019  9:15 AM   Resp 13 5/29/2019  9:15 AM   SpO2 96 % 5/29/2019  9:15 AM         Event Time     Out of Recovery 09:00:00          Pain/Sammi Score: Sammi Score: 10 (5/29/2019  9:00 AM)  Modified Sammi Score: 20 (5/29/2019  9:15 AM)

## 2019-05-31 ENCOUNTER — TELEPHONE (OUTPATIENT)
Dept: UROLOGY | Facility: CLINIC | Age: 79
End: 2019-05-31

## 2019-05-31 NOTE — TELEPHONE ENCOUNTER
----- Message from Rekha Howe sent at 5/31/2019 12:36 PM CDT -----  Contact: Self  Patient had a procedure on Wed 5/29 and needs a post op appt on or around 6/11., but he can not come in on Wednesdays.  I could not schedule him until July.   Please call back at [phone] (home).  Thanks

## 2019-05-31 NOTE — TELEPHONE ENCOUNTER
----- Message from Alba Obando sent at 5/31/2019  2:29 PM CDT -----  Contact: patient  Type:  Patient Returning Call    Who Called:  patient  Who Left Message for Patient:  Aleisha  Does the patient know what this is regarding?: yes  Best Call Back Number:  [phone]  Additional Information:  Requesting a call back,place call to pod

## 2019-06-06 ENCOUNTER — HOSPITAL ENCOUNTER (OUTPATIENT)
Dept: RADIOLOGY | Facility: HOSPITAL | Age: 79
Discharge: HOME | End: 2019-06-06
Attending: UROLOGY
Payer: MEDICARE

## 2019-06-06 ENCOUNTER — OFFICE VISIT (OUTPATIENT)
Dept: UROLOGY | Facility: CLINIC | Age: 79
End: 2019-06-06
Payer: MEDICARE

## 2019-06-06 VITALS
BODY MASS INDEX: 25.57 KG/M2 | SYSTOLIC BLOOD PRESSURE: 132 MMHG | DIASTOLIC BLOOD PRESSURE: 83 MMHG | RESPIRATION RATE: 16 BRPM | TEMPERATURE: 98 F | HEART RATE: 66 BPM | HEIGHT: 72 IN | WEIGHT: 188.81 LBS

## 2019-06-06 DIAGNOSIS — N20.0 KIDNEY STONE ON LEFT SIDE: ICD-10-CM

## 2019-06-06 DIAGNOSIS — N20.0 KIDNEY STONE ON LEFT SIDE: Primary | ICD-10-CM

## 2019-06-06 LAB
BILIRUB SERPL-MCNC: NEGATIVE MG/DL
BLOOD URINE, POC: ABNORMAL
COLOR, POC UA: ABNORMAL
GLUCOSE UR QL STRIP: NEGATIVE
KETONES UR QL STRIP: NEGATIVE
LEUKOCYTE ESTERASE URINE, POC: ABNORMAL
NITRITE, POC UA: NEGATIVE
PH, POC UA: 8
PROTEIN, POC: 30
SPECIFIC GRAVITY, POC UA: 1005
UROBILINOGEN, POC UA: NEGATIVE

## 2019-06-06 PROCEDURE — 99999 PR PBB SHADOW E&M-EST. PATIENT-LVL III: CPT | Mod: PBBFAC,,, | Performed by: UROLOGY

## 2019-06-06 PROCEDURE — 74018 RADEX ABDOMEN 1 VIEW: CPT | Mod: 26,,, | Performed by: RADIOLOGY

## 2019-06-06 PROCEDURE — 99024 POSTOP FOLLOW-UP VISIT: CPT | Mod: S$GLB,,, | Performed by: UROLOGY

## 2019-06-06 PROCEDURE — 82365 CALCULUS SPECTROSCOPY: CPT

## 2019-06-06 PROCEDURE — 99024 PR POST-OP FOLLOW-UP VISIT: ICD-10-PCS | Mod: S$GLB,,, | Performed by: UROLOGY

## 2019-06-06 PROCEDURE — 81002 POCT URINE DIPSTICK WITHOUT MICROSCOPE: ICD-10-PCS | Mod: S$GLB,,, | Performed by: UROLOGY

## 2019-06-06 PROCEDURE — 81002 URINALYSIS NONAUTO W/O SCOPE: CPT | Mod: S$GLB,,, | Performed by: UROLOGY

## 2019-06-06 PROCEDURE — 74018 RADEX ABDOMEN 1 VIEW: CPT | Mod: TC,PN

## 2019-06-06 PROCEDURE — 74018 XR ABDOMEN AP 1 VIEW: ICD-10-PCS | Mod: 26,,, | Performed by: RADIOLOGY

## 2019-06-06 PROCEDURE — 99999 PR PBB SHADOW E&M-EST. PATIENT-LVL III: ICD-10-PCS | Mod: PBBFAC,,, | Performed by: UROLOGY

## 2019-06-06 NOTE — LETTER
June 6, 2019      Harley Payne MD  [address]   [address]  [place] [address]           [facility]  [address]  [place] [address]  Phone: [phone]  Fax: [phone]          Patient: Nino Mackay   MR Number: [id number]   YOB: 1940   Date of Visit: 6/6/2019       Dear Dr. Harley Payne:    Thank you for referring Nino Mackay to me for evaluation. Attached you will find relevant portions of my assessment and plan of care.    If you have questions, please do not hesitate to call me. I look forward to following Nino Mackay along with you.    Sincerely,    Kobi Troncoso MD    Enclosure  CC:  No Recipients    If you would like to receive this communication electronically, please contact [email or web address] or [phone] to request more information on [facility] access.    For providers and/or their staff who would like to refer a patient to [facility], please contact us through our one-stop-shop provider referral line, [facility], at [phone].    If you feel you have received this communication in error or would no longer like to receive these types of communications, please e-mail [email or web address]

## 2019-06-06 NOTE — PROGRESS NOTES
CHIEF COMPLAINT:  Postoperative evaluation after left ESWL performed on  05/29/2019.    The patient referred that in general he was doing well until a couple of  days ago when his urine flow at urination has significantly decreased and  today it is just dribbling.  Denies any gross hematuria.  Denies any flank  pain or fever.    He underwent a KUB film that shows a significant improvement of the  urolithiasis on the left compared to the previous KUB film before the  procedure.    I measured the postvoid residual urine and was found to be at 40 mL, but at  palpation of the penis, you can feel a hard spot in the fossa navicularis.   I suggested to the patient that we place a catheter with the hope to  dislodge that it is possible he had a stone in that area.    I really went ahead and after proper hygiene of the penis with Betadine was  done, I inserted a lidocaine gel in the urethra and inserted an in and out  16 coude catheter.  To be noted is it was very difficult to pass the fossa  navicularis.  Eventually, the fossa navicularis was passed and still the  hard spot can be felt through the skin.  Upon removal of the catheter with  some difficulty, the patient's stone came out.  The stone was approximately  1 cm in the maximum diameter.  Minimal amount of bleeding was seen.  I went  ahead and suggested to the patient that we send the stone for analysis and  put an order for a 24-hour urine chemistry analysis to be done at [facility].  He is going to do that and is going to come back in six  weeks to analyze the results of his 24-hour urine chemistry analysis and  the stone analysis.  All the questions were answered to his satisfaction.   He left the office in satisfactory condition.        EOR/HN dd: 06/06/2019 13:44:15 (CDT)   td: 06/06/2019 20:35:31 (CDT)  Doc ID #[id number]   Job ID #[id number]    CC:

## 2019-06-10 ENCOUNTER — TELEPHONE (OUTPATIENT)
Dept: UROLOGY | Facility: CLINIC | Age: 79
End: 2019-06-10

## 2019-06-10 DIAGNOSIS — N20.0 KIDNEY STONE ON LEFT SIDE: Primary | ICD-10-CM

## 2019-06-10 DIAGNOSIS — N20.0 KIDNEY STONES: ICD-10-CM

## 2019-06-10 RX ORDER — CEPHALEXIN 500 MG/1
500 CAPSULE ORAL EVERY 12 HOURS
Qty: 10 CAPSULE | Refills: 0 | Status: SHIPPED | OUTPATIENT
Start: 2019-06-10 | End: 2019-06-15

## 2019-06-10 NOTE — TELEPHONE ENCOUNTER
Pt states he is having decreased urine flow and burning during urinating. Informed pt that I will let Dr. Troncoso know so he can further advise him. Pt verbalized understanding.

## 2019-06-10 NOTE — TELEPHONE ENCOUNTER
----- Message from Geneva Infante sent at 6/10/2019 11:02 AM CDT -----  Contact: pt  Calling with problems after kidney stone removal and please advise [phone]

## 2019-06-11 ENCOUNTER — TELEPHONE (OUTPATIENT)
Dept: UROLOGY | Facility: CLINIC | Age: 79
End: 2019-06-11

## 2019-06-11 LAB
ANNOTATION COMMENT IMP: NORMAL
COMPN STONE: NORMAL
SPECIMEN SOURCE: NORMAL
STONE ANALYSIS IR-IMP: NORMAL

## 2019-06-11 NOTE — TELEPHONE ENCOUNTER
----- Message from Chad Marroquin sent at 6/11/2019 10:31 AM CDT -----  Contact: self   Patient want to speak with nurse to explain why he cancelled upcoming appointment. Please call back at [phone] (home)

## 2019-06-11 NOTE — TELEPHONE ENCOUNTER
Pt states he passed a stone and is now cleared up and does not need procedure.  He is not urinating fine. Informed pt I will notify provider. Pt verbalized understanding.

## 2019-07-18 ENCOUNTER — TELEPHONE (OUTPATIENT)
Dept: UROLOGY | Facility: CLINIC | Age: 79
End: 2019-07-18

## 2019-07-18 NOTE — TELEPHONE ENCOUNTER
----- Message from Alice Roberto sent at 7/18/2019 10:51 AM CDT -----  Type: Needs Medical Advice    Who Called:  Patient  Best Call Back Number: [phone] (home)     Additional Information: Patient sent in his home test kit for kidney stones over two weeks ago and he still has not heard the results from office. Please call to advise.

## 2019-07-18 NOTE — TELEPHONE ENCOUNTER
Left voicemail with call back number.  Informed pt we received his results just the other day and he will need to make a follow up appointment to discuss them.

## 2019-08-02 ENCOUNTER — OFFICE VISIT (OUTPATIENT)
Dept: UROLOGY | Facility: CLINIC | Age: 79
End: 2019-08-02
Payer: MEDICARE

## 2019-08-02 VITALS
RESPIRATION RATE: 16 BRPM | DIASTOLIC BLOOD PRESSURE: 68 MMHG | HEART RATE: 66 BPM | SYSTOLIC BLOOD PRESSURE: 121 MMHG | WEIGHT: 187 LBS | BODY MASS INDEX: 25.33 KG/M2 | TEMPERATURE: 98 F | HEIGHT: 72 IN

## 2019-08-02 DIAGNOSIS — N20.0 KIDNEY STONES: Primary | ICD-10-CM

## 2019-08-02 PROCEDURE — 99024 PR POST-OP FOLLOW-UP VISIT: ICD-10-PCS | Mod: S$GLB,,, | Performed by: UROLOGY

## 2019-08-02 PROCEDURE — 1101F PT FALLS ASSESS-DOCD LE1/YR: CPT | Mod: CPTII,S$GLB,, | Performed by: UROLOGY

## 2019-08-02 PROCEDURE — 99024 POSTOP FOLLOW-UP VISIT: CPT | Mod: S$GLB,,, | Performed by: UROLOGY

## 2019-08-02 PROCEDURE — 99999 PR PBB SHADOW E&M-EST. PATIENT-LVL IV: CPT | Mod: PBBFAC,,, | Performed by: UROLOGY

## 2019-08-02 PROCEDURE — 99999 PR PBB SHADOW E&M-EST. PATIENT-LVL IV: ICD-10-PCS | Mod: PBBFAC,,, | Performed by: UROLOGY

## 2019-08-02 PROCEDURE — 1101F PR PT FALLS ASSESS DOC 0-1 FALLS W/OUT INJ PAST YR: ICD-10-PCS | Mod: CPTII,S$GLB,, | Performed by: UROLOGY

## 2019-08-02 RX ORDER — POTASSIUM CITRATE 15 MEQ/1
1 TABLET, EXTENDED RELEASE ORAL DAILY
Qty: 90 TABLET | Refills: 3 | Status: SHIPPED | OUTPATIENT
Start: 2019-08-02 | End: 2022-10-19

## 2019-08-02 NOTE — Clinical Note
August 2, 2019      Harley Payne MD  [address]  [place] [address]           [facility]  [address] [place] [address]  Phone: [phone]  Fax: [phone]          Patient: Nino Mackay   MR Number: [id number]   YOB: 1940   Date of Visit: 8/2/2019       Dear Dr. Harley Payne:    Thank you for referring Nino Mackay to me for evaluation. Attached you will find relevant portions of my assessment and plan of care.    If you have questions, please do not hesitate to call me. I look forward to following Nino Mackay along with you.    Sincerely,    Kobi Troncoso MD    Enclosure  CC:  No Recipients    If you would like to receive this communication electronically, please contact [email or web address] or [phone] to request more information on [facility] access.    For providers and/or their staff who would like to refer a patient to [facility], please contact us through our one-stop-shop provider referral line, [facility], at [phone].    If you feel you have received this communication in error or would no longer like to receive these types of communications, please e-mail [email or web address]

## 2019-08-02 NOTE — PROGRESS NOTES
CHIEF COMPLAINT:  Kidney stones.    Mr. Mackay is a 79-year-old male who underwent an ESWL for left kidney  stone on 05/20/2019.  The stone fragment shows that the patient had calcium  oxalate and a small amount of calcium phosphate.  The patient underwent a  24-hour urine chemistry analysis that shows that the patient has very low  titrate with a good urine volume.  Today, I had a lengthy discussion with  him and I suggested that he needs to keep drinking a large amount of fluid,  but he can probably increase the amount of lemonade that he drinks, the  regular lemon squeeze lemonade and also I suggested that we start taking  potassium citrate 15 mEq every 24 hours with a slow release.  The patient  is taking potassium because he has a history of low potassium and he is  afraid that the potassium will go too high.  I suggested that this is  probably not going to affect his potassium level in the blood, but we will  check his potassium level in two weeks after he started taking his  potassium citrate.  All the questions were answered at his satisfaction and  he left the office in satisfactory condition.  I will follow him up in two  weeks with a BMP and in a year for a visit.  The report was dictated by Dr. Troncoso.    I spent approximately 30 minutes with Mr. Mackay and all the time was  spent on counseling.        EOR/HN dd: 08/02/2019 13:08:43 (CDT)   td: 08/02/2019 21:25:51 (CDT)  Doc ID #[id number]   Job ID #[id number]    CC:

## 2020-06-01 RX ORDER — TAMSULOSIN HYDROCHLORIDE 0.4 MG/1
CAPSULE ORAL
Qty: 30 CAPSULE | Refills: 11 | Status: SHIPPED | OUTPATIENT
Start: 2020-06-01 | End: 2020-09-16 | Stop reason: SDUPTHER

## 2020-09-16 NOTE — TELEPHONE ENCOUNTER
----- Message from Henry Vazquez sent at 9/16/2020  2:35 PM CDT -----  Type: Needs Medical Advice    Who Called:  Patient  Pharmacy name and phone #:    [facility] - [place], [place] - [address]  [address] [address]  Phone: [phone] Fax: [phone]  Best Call Back Number: [phone]  Additional Information: Patient states tamsulosin (FLOMAX) 0.4 mg Cap needs to be transferred to the above. Please call to advise. Thanks!

## 2020-09-21 RX ORDER — TAMSULOSIN HYDROCHLORIDE 0.4 MG/1
0.4 CAPSULE ORAL DAILY
Qty: 90 CAPSULE | Refills: 0 | Status: SHIPPED | OUTPATIENT
Start: 2020-09-21 | End: 2021-08-20 | Stop reason: SDUPTHER

## 2021-05-18 ENCOUNTER — TELEPHONE (OUTPATIENT)
Dept: UROLOGY | Facility: CLINIC | Age: 81
End: 2021-05-18

## 2021-07-01 ENCOUNTER — PATIENT MESSAGE (OUTPATIENT)
Dept: ADMINISTRATIVE | Facility: OTHER | Age: 81
End: 2021-07-01

## 2021-08-20 ENCOUNTER — OFFICE VISIT (OUTPATIENT)
Dept: UROLOGY | Facility: CLINIC | Age: 81
End: 2021-08-20
Payer: MEDICARE

## 2021-08-20 VITALS
BODY MASS INDEX: 25.32 KG/M2 | HEART RATE: 69 BPM | SYSTOLIC BLOOD PRESSURE: 121 MMHG | DIASTOLIC BLOOD PRESSURE: 72 MMHG | WEIGHT: 186.94 LBS | HEIGHT: 72 IN

## 2021-08-20 DIAGNOSIS — N40.0 BENIGN PROSTATIC HYPERPLASIA, UNSPECIFIED WHETHER LOWER URINARY TRACT SYMPTOMS PRESENT: Primary | ICD-10-CM

## 2021-08-20 LAB
BILIRUB SERPL-MCNC: 0.2 MG/DL
BLOOD URINE, POC: NORMAL
CLARITY, POC UA: CLEAR
COLOR, POC UA: YELLOW
GLUCOSE UR QL STRIP: NORMAL
KETONES UR QL STRIP: NORMAL
LEUKOCYTE ESTERASE URINE, POC: NORMAL
NITRITE, POC UA: NORMAL
PH, POC UA: 7.5
POC RESIDUAL URINE VOLUME: 17 ML (ref 0–100)
PROTEIN, POC: NORMAL
SPECIFIC GRAVITY, POC UA: 1.02
UROBILINOGEN, POC UA: 0

## 2021-08-20 PROCEDURE — 99999 PR PBB SHADOW E&M-EST. PATIENT-LVL III: CPT | Mod: PBBFAC,,, | Performed by: UROLOGY

## 2021-08-20 PROCEDURE — 99214 PR OFFICE/OUTPT VISIT, EST, LEVL IV, 30-39 MIN: ICD-10-PCS | Mod: S$GLB,,, | Performed by: UROLOGY

## 2021-08-20 PROCEDURE — 51798 US URINE CAPACITY MEASURE: CPT | Mod: S$GLB,,, | Performed by: UROLOGY

## 2021-08-20 PROCEDURE — 1101F PT FALLS ASSESS-DOCD LE1/YR: CPT | Mod: CPTII,S$GLB,, | Performed by: UROLOGY

## 2021-08-20 PROCEDURE — 81002 POCT URINE DIPSTICK WITHOUT MICROSCOPE: ICD-10-PCS | Mod: S$GLB,,, | Performed by: UROLOGY

## 2021-08-20 PROCEDURE — 3078F PR MOST RECENT DIASTOLIC BLOOD PRESSURE < 80 MM HG: ICD-10-PCS | Mod: CPTII,S$GLB,, | Performed by: UROLOGY

## 2021-08-20 PROCEDURE — 1160F RVW MEDS BY RX/DR IN RCRD: CPT | Mod: CPTII,S$GLB,, | Performed by: UROLOGY

## 2021-08-20 PROCEDURE — 3288F FALL RISK ASSESSMENT DOCD: CPT | Mod: CPTII,S$GLB,, | Performed by: UROLOGY

## 2021-08-20 PROCEDURE — 3288F PR FALLS RISK ASSESSMENT DOCUMENTED: ICD-10-PCS | Mod: CPTII,S$GLB,, | Performed by: UROLOGY

## 2021-08-20 PROCEDURE — 1159F MED LIST DOCD IN RCRD: CPT | Mod: CPTII,S$GLB,, | Performed by: UROLOGY

## 2021-08-20 PROCEDURE — 3078F DIAST BP <80 MM HG: CPT | Mod: CPTII,S$GLB,, | Performed by: UROLOGY

## 2021-08-20 PROCEDURE — 1126F AMNT PAIN NOTED NONE PRSNT: CPT | Mod: CPTII,S$GLB,, | Performed by: UROLOGY

## 2021-08-20 PROCEDURE — 1101F PR PT FALLS ASSESS DOC 0-1 FALLS W/OUT INJ PAST YR: ICD-10-PCS | Mod: CPTII,S$GLB,, | Performed by: UROLOGY

## 2021-08-20 PROCEDURE — 99214 OFFICE O/P EST MOD 30 MIN: CPT | Mod: S$GLB,,, | Performed by: UROLOGY

## 2021-08-20 PROCEDURE — 99999 PR PBB SHADOW E&M-EST. PATIENT-LVL III: ICD-10-PCS | Mod: PBBFAC,,, | Performed by: UROLOGY

## 2021-08-20 PROCEDURE — 3074F SYST BP LT 130 MM HG: CPT | Mod: CPTII,S$GLB,, | Performed by: UROLOGY

## 2021-08-20 PROCEDURE — 1159F PR MEDICATION LIST DOCUMENTED IN MEDICAL RECORD: ICD-10-PCS | Mod: CPTII,S$GLB,, | Performed by: UROLOGY

## 2021-08-20 PROCEDURE — 1126F PR PAIN SEVERITY QUANTIFIED, NO PAIN PRESENT: ICD-10-PCS | Mod: CPTII,S$GLB,, | Performed by: UROLOGY

## 2021-08-20 PROCEDURE — 81002 URINALYSIS NONAUTO W/O SCOPE: CPT | Mod: S$GLB,,, | Performed by: UROLOGY

## 2021-08-20 PROCEDURE — 51798 POCT BLADDER SCAN: ICD-10-PCS | Mod: S$GLB,,, | Performed by: UROLOGY

## 2021-08-20 PROCEDURE — 1160F PR REVIEW ALL MEDS BY PRESCRIBER/CLIN PHARMACIST DOCUMENTED: ICD-10-PCS | Mod: CPTII,S$GLB,, | Performed by: UROLOGY

## 2021-08-20 PROCEDURE — 3074F PR MOST RECENT SYSTOLIC BLOOD PRESSURE < 130 MM HG: ICD-10-PCS | Mod: CPTII,S$GLB,, | Performed by: UROLOGY

## 2021-08-20 RX ORDER — TAMSULOSIN HYDROCHLORIDE 0.4 MG/1
0.4 CAPSULE ORAL DAILY
Qty: 90 CAPSULE | Refills: 3 | Status: SHIPPED | OUTPATIENT
Start: 2021-08-20 | End: 2021-09-30 | Stop reason: SDUPTHER

## 2021-08-20 RX ORDER — CLONIDINE HYDROCHLORIDE 0.1 MG/1
TABLET ORAL
COMMUNITY
Start: 2017-05-16 | End: 2022-10-19 | Stop reason: SDUPTHER

## 2021-09-27 ENCOUNTER — TELEPHONE (OUTPATIENT)
Dept: UROLOGY | Facility: CLINIC | Age: 81
End: 2021-09-27

## 2021-09-30 RX ORDER — TAMSULOSIN HYDROCHLORIDE 0.4 MG/1
0.8 CAPSULE ORAL DAILY
Qty: 180 CAPSULE | Refills: 3 | Status: SHIPPED | OUTPATIENT
Start: 2021-09-30 | End: 2022-08-09

## 2021-12-09 ENCOUNTER — OFFICE VISIT (OUTPATIENT)
Dept: PODIATRY | Facility: CLINIC | Age: 81
End: 2021-12-09
Payer: MEDICARE

## 2021-12-09 VITALS — WEIGHT: 185 LBS | OXYGEN SATURATION: 98 % | HEIGHT: 72 IN | BODY MASS INDEX: 25.06 KG/M2

## 2021-12-09 DIAGNOSIS — M79.674 GREAT TOE PAIN, RIGHT: Primary | ICD-10-CM

## 2021-12-09 DIAGNOSIS — M20.41 HAMMER TOE OF RIGHT FOOT: ICD-10-CM

## 2021-12-09 DIAGNOSIS — M20.11 HALLUX VALGUS OF RIGHT FOOT: ICD-10-CM

## 2021-12-09 PROCEDURE — 99203 OFFICE O/P NEW LOW 30 MIN: CPT | Mod: S$GLB,,, | Performed by: PODIATRIST

## 2021-12-09 PROCEDURE — 99203 PR OFFICE/OUTPT VISIT, NEW, LEVL III, 30-44 MIN: ICD-10-PCS | Mod: S$GLB,,, | Performed by: PODIATRIST

## 2021-12-09 RX ORDER — POTASSIUM CHLORIDE 750 MG/1
CAPSULE, EXTENDED RELEASE ORAL
COMMUNITY
Start: 2021-09-14 | End: 2023-10-30 | Stop reason: SDUPTHER

## 2021-12-09 RX ORDER — CHOLECALCIFEROL (VITAMIN D3) 50 MCG
CAPSULE ORAL
COMMUNITY
Start: 2021-10-11

## 2022-06-30 ENCOUNTER — TELEPHONE (OUTPATIENT)
Dept: FAMILY MEDICINE | Facility: CLINIC | Age: 82
End: 2022-06-30
Payer: COMMERCIAL

## 2022-06-30 NOTE — TELEPHONE ENCOUNTER
----- Message from Laila  sent at 6/30/2022  3:21 PM CDT -----  Regarding: appt access  Type: Needs Medical Advice    Who Called:      Best Call Back Number:   Additional Information: Requesting a call back from Nurse, regarding pt wants access to an appt to est care/6 month check up _NP ,please advise call back

## 2022-06-30 NOTE — TELEPHONE ENCOUNTER
Patient declined any appt with NP or PA, and appt with DR. Ceballos was to far out, patient will call [facility] to get an appt there

## 2022-09-15 ENCOUNTER — TELEPHONE (OUTPATIENT)
Dept: FAMILY MEDICINE | Facility: CLINIC | Age: 82
End: 2022-09-15

## 2022-09-15 ENCOUNTER — OFFICE VISIT (OUTPATIENT)
Dept: URGENT CARE | Facility: CLINIC | Age: 82
End: 2022-09-15
Payer: COMMERCIAL

## 2022-09-15 VITALS
RESPIRATION RATE: 20 BRPM | TEMPERATURE: 97 F | BODY MASS INDEX: 24.24 KG/M2 | DIASTOLIC BLOOD PRESSURE: 76 MMHG | OXYGEN SATURATION: 98 % | HEART RATE: 93 BPM | HEIGHT: 72 IN | WEIGHT: 179 LBS | SYSTOLIC BLOOD PRESSURE: 120 MMHG

## 2022-09-15 DIAGNOSIS — H81.10 BENIGN PAROXYSMAL VERTIGO, UNSPECIFIED LATERALITY: ICD-10-CM

## 2022-09-15 DIAGNOSIS — H61.23 BILATERAL IMPACTED CERUMEN: Primary | ICD-10-CM

## 2022-09-15 PROCEDURE — 99204 OFFICE O/P NEW MOD 45 MIN: CPT | Mod: 25,S$GLB,, | Performed by: NURSE PRACTITIONER

## 2022-09-15 PROCEDURE — 69209 REMOVE IMPACTED EAR WAX UNI: CPT | Mod: 50,S$GLB,, | Performed by: NURSE PRACTITIONER

## 2022-09-15 PROCEDURE — 1159F PR MEDICATION LIST DOCUMENTED IN MEDICAL RECORD: ICD-10-PCS | Mod: CPTII,S$GLB,, | Performed by: NURSE PRACTITIONER

## 2022-09-15 PROCEDURE — 69209 EAR CERUMEN REMOVAL: ICD-10-PCS | Mod: 50,S$GLB,, | Performed by: NURSE PRACTITIONER

## 2022-09-15 PROCEDURE — 1159F MED LIST DOCD IN RCRD: CPT | Mod: CPTII,S$GLB,, | Performed by: NURSE PRACTITIONER

## 2022-09-15 PROCEDURE — 99204 PR OFFICE/OUTPT VISIT, NEW, LEVL IV, 45-59 MIN: ICD-10-PCS | Mod: 25,S$GLB,, | Performed by: NURSE PRACTITIONER

## 2022-09-15 PROCEDURE — 1160F PR REVIEW ALL MEDS BY PRESCRIBER/CLIN PHARMACIST DOCUMENTED: ICD-10-PCS | Mod: CPTII,S$GLB,, | Performed by: NURSE PRACTITIONER

## 2022-09-15 PROCEDURE — 1160F RVW MEDS BY RX/DR IN RCRD: CPT | Mod: CPTII,S$GLB,, | Performed by: NURSE PRACTITIONER

## 2022-09-15 RX ORDER — MECLIZINE HYDROCHLORIDE 25 MG/1
25 TABLET ORAL 3 TIMES DAILY PRN
Qty: 30 TABLET | Refills: 0 | Status: SHIPPED | OUTPATIENT
Start: 2022-09-15 | End: 2023-08-25

## 2022-09-15 NOTE — PROCEDURES
"Ear Cerumen Removal    Date/Time: 9/15/2022 10:50 AM  Performed by: CARON Sheppard Jr.  Authorized by: CARON Sheppard Jr.     Time out: Immediately prior to procedure a "time out" was called to verify the correct patient, procedure, equipment, support staff and site/side marked as required.    Consent Done?:  Yes (Verbal)    Local anesthetic:  None  Ceruminolytics applied: Ceruminolytics applied prior to the procedure    Medication Used:  Cerumenex  Location details:  Both ears  Procedure type: irrigation    Cerumen  Removal Results:  Cerumen completely removed  Patient tolerance:  Patient tolerated the procedure well with no immediate complications     Bilateral TM's visible and unremarkable after cerumen removal.  "

## 2022-09-15 NOTE — PROGRESS NOTES
Subjective:       Patient ID: Nino Mackay is a 82 y.o. male.    Vitals:  height is 6' (1.829 m) and weight is 81.2 kg (179 lb). His temperature is 97.4 °F (36.3 °C). His blood pressure is 120/76 and his pulse is 93. His respiration is 20 and oxygen saturation is 98%.     Chief Complaint: Dizziness    Pt states c/o room spinning when getting out of bed, dizziness that has been intermittent for 2 days. No current dizziness.      Constitution: Negative for chills and fever.   HENT:  Negative for ear pain, tinnitus, hearing loss, sinus pressure and sore throat.    Neck: Negative for painful lymph nodes.   Cardiovascular:  Negative for chest pain, palpitations and sob on exertion.   Respiratory:  Negative for cough, sputum production and shortness of breath.    Gastrointestinal:  Negative for abdominal pain, nausea, vomiting and diarrhea.   Musculoskeletal:  Negative for pain and muscle ache.   Skin:  Negative for rash, wound and lesion.   Neurological:  Negative for dizziness, history of vertigo, light-headedness, passing out, facial drooping, speech difficulty, loss of balance, headaches, disorientation, altered mental status, loss of consciousness, numbness, tingling and tremors.   Hematologic/Lymphatic: Negative for swollen lymph nodes.   Psychiatric/Behavioral:  Negative for altered mental status, disorientation and confusion.      Objective:      Physical Exam   Constitutional: He is oriented to person, place, and time. He appears well-developed. He is cooperative.  Non-toxic appearance. He does not appear ill. No distress.   HENT:   Head: Normocephalic and atraumatic.   Ears:   Right Ear: Hearing and external ear normal. There is cerumen present.   Left Ear: Hearing and external ear normal. There is cerumen present.   Nose: Nose normal. No mucosal edema, rhinorrhea or nasal deformity. No epistaxis. Right sinus exhibits no maxillary sinus tenderness and no frontal sinus tenderness. Left sinus exhibits  no maxillary sinus tenderness and no frontal sinus tenderness.   Mouth/Throat: Uvula is midline, oropharynx is clear and moist and mucous membranes are normal. Mucous membranes are moist. No trismus in the jaw. Normal dentition. No uvula swelling. No oropharyngeal exudate, posterior oropharyngeal edema, posterior oropharyngeal erythema or tonsillar abscesses. Tonsils are 0 on the right. Tonsils are 0 on the left. No tonsillar exudate. Oropharynx is clear.   Eyes: Conjunctivae and lids are normal. No visual field deficit is present. No scleral icterus.   Neck: Trachea normal and phonation normal. Neck supple. No JVD present. No edema present. No erythema present. No neck rigidity present.   Cardiovascular: Normal rate, regular rhythm, normal heart sounds and normal pulses.   Pulmonary/Chest: Effort normal and breath sounds normal. No respiratory distress. He has no decreased breath sounds. He has no rhonchi.   Abdominal: Normal appearance.   Musculoskeletal: Normal range of motion.         General: No deformity. Normal range of motion.   Neurological: no focal deficit. He is alert, oriented to person, place, and time and at baseline. He has normal motor skills and normal sensation. He displays facial symmetry and no dysarthria. No cranial nerve deficit. He exhibits normal muscle tone. He has a normal Finger-Nose-Finger Test. Coordination: Romberg sign negative and Heel to shin test normal. Gait and coordination normal. Coordination normal. GCS eye subscore is 4. GCS verbal subscore is 5. GCS motor subscore is 6.   Skin: Skin is warm, dry, intact, not diaphoretic and not pale. Capillary refill takes 2 to 3 seconds.   Psychiatric: His speech is normal and behavior is normal. Judgment and thought content normal.   Nursing note and vitals reviewed.      Assessment:       1. Bilateral impacted cerumen    2. Benign paroxysmal vertigo, unspecified laterality          Plan:         Bilateral impacted cerumen  -     Ear wax  removal    Benign paroxysmal vertigo, unspecified laterality  -     meclizine (ANTIVERT) 25 mg tablet; Take 1 tablet (25 mg total) by mouth 3 (three) times daily as needed for Dizziness.  Dispense: 30 tablet; Refill: 0      I have discussed the procedure results and physical exam findings with the patient. He continues to deny any current dizziness and is without neurologic deficit during examination. We discussed symptom monitoring, treatment options, medication use, and follow up orders. He verbalized understanding and agreement with the plan of care. He was ambulatory from clinic to car in parking lot approximately 75 yards without noted alteration in gait or falling.         May use over the counter ear wax softener and allow water in shower to run in ears to help keep ears clear  Do not use q tips or other objects in ear  Take antivert as needed for dizziness.  Follow up with your pcp  If you become dizzy with associated headache, shortness of breath, chest pain, fever, or loss of function or sensation, go immediately to the nearest ER  Return to clinic for any new concern.

## 2022-09-15 NOTE — TELEPHONE ENCOUNTER
Spoke with pts wife, he is not currently established with us. New patient appt is in October - she is going to take him to urgent care.

## 2022-09-15 NOTE — TELEPHONE ENCOUNTER
----- Message from Kelly Laura MA sent at 9/15/2022  8:55 AM CDT -----  Pt is having extreme dizziness and wants to be seen as soon as possible. CB# [phone]/ [phone]

## 2022-09-15 NOTE — PATIENT INSTRUCTIONS
May use over the counter ear wax softener and allow water in shower to run in ears to help keep ears clear  Do not use q tips or other objects in ear  Take antivert as needed for dizziness.  Follow up with your pcp  If you become dizzy with associated headache, shortness of breath, chest pain, fever, or loss of function or sensation, go immediately to the nearest ER  Return to clinic for any new concern.

## 2022-10-19 ENCOUNTER — OFFICE VISIT (OUTPATIENT)
Dept: FAMILY MEDICINE | Facility: CLINIC | Age: 82
End: 2022-10-19
Payer: MEDICARE

## 2022-10-19 VITALS
BODY MASS INDEX: 23.98 KG/M2 | WEIGHT: 177 LBS | DIASTOLIC BLOOD PRESSURE: 73 MMHG | SYSTOLIC BLOOD PRESSURE: 130 MMHG | HEIGHT: 72 IN | HEART RATE: 76 BPM

## 2022-10-19 DIAGNOSIS — I10 HYPERTENSION, UNSPECIFIED TYPE: Primary | ICD-10-CM

## 2022-10-19 DIAGNOSIS — I71.43 INFRARENAL ABDOMINAL AORTIC ANEURYSM (AAA) WITHOUT RUPTURE: ICD-10-CM

## 2022-10-19 DIAGNOSIS — F51.01 PRIMARY INSOMNIA: ICD-10-CM

## 2022-10-19 DIAGNOSIS — N40.0 BENIGN PROSTATIC HYPERPLASIA WITHOUT LOWER URINARY TRACT SYMPTOMS: ICD-10-CM

## 2022-10-19 DIAGNOSIS — I71.40 ABDOMINAL AORTIC ANEURYSM (AAA) WITHOUT RUPTURE, UNSPECIFIED PART: ICD-10-CM

## 2022-10-19 DIAGNOSIS — I49.9 CARDIAC ARRHYTHMIA, UNSPECIFIED CARDIAC ARRHYTHMIA TYPE: ICD-10-CM

## 2022-10-19 DIAGNOSIS — E78.2 MIXED HYPERLIPIDEMIA: ICD-10-CM

## 2022-10-19 DIAGNOSIS — Z12.5 SPECIAL SCREENING FOR MALIGNANT NEOPLASM OF PROSTATE: ICD-10-CM

## 2022-10-19 DIAGNOSIS — F32.9 REACTIVE DEPRESSION: ICD-10-CM

## 2022-10-19 DIAGNOSIS — G31.84 MCI (MILD COGNITIVE IMPAIRMENT) WITH MEMORY LOSS: ICD-10-CM

## 2022-10-19 PROCEDURE — 3078F PR MOST RECENT DIASTOLIC BLOOD PRESSURE < 80 MM HG: ICD-10-PCS | Mod: CPTII,S$GLB,, | Performed by: FAMILY MEDICINE

## 2022-10-19 PROCEDURE — 93000 POCT EKG 12-LEAD: ICD-10-PCS | Mod: S$GLB,,, | Performed by: FAMILY MEDICINE

## 2022-10-19 PROCEDURE — 3078F DIAST BP <80 MM HG: CPT | Mod: CPTII,S$GLB,, | Performed by: FAMILY MEDICINE

## 2022-10-19 PROCEDURE — 3075F SYST BP GE 130 - 139MM HG: CPT | Mod: CPTII,S$GLB,, | Performed by: FAMILY MEDICINE

## 2022-10-19 PROCEDURE — 99205 PR OFFICE/OUTPT VISIT, NEW, LEVL V, 60-74 MIN: ICD-10-PCS | Mod: 25,S$GLB,, | Performed by: FAMILY MEDICINE

## 2022-10-19 PROCEDURE — 3075F PR MOST RECENT SYSTOLIC BLOOD PRESS GE 130-139MM HG: ICD-10-PCS | Mod: CPTII,S$GLB,, | Performed by: FAMILY MEDICINE

## 2022-10-19 PROCEDURE — 93000 ELECTROCARDIOGRAM COMPLETE: CPT | Mod: S$GLB,,, | Performed by: FAMILY MEDICINE

## 2022-10-19 PROCEDURE — 1159F MED LIST DOCD IN RCRD: CPT | Mod: CPTII,S$GLB,, | Performed by: FAMILY MEDICINE

## 2022-10-19 PROCEDURE — 99205 OFFICE O/P NEW HI 60 MIN: CPT | Mod: 25,S$GLB,, | Performed by: FAMILY MEDICINE

## 2022-10-19 PROCEDURE — 1159F PR MEDICATION LIST DOCUMENTED IN MEDICAL RECORD: ICD-10-PCS | Mod: CPTII,S$GLB,, | Performed by: FAMILY MEDICINE

## 2022-10-19 RX ORDER — TAMSULOSIN HYDROCHLORIDE 0.4 MG/1
2 CAPSULE ORAL DAILY
Qty: 180 CAPSULE | Refills: 3 | Status: SHIPPED | OUTPATIENT
Start: 2022-10-19 | End: 2023-10-30 | Stop reason: SDUPTHER

## 2022-10-19 RX ORDER — LISINOPRIL AND HYDROCHLOROTHIAZIDE 10; 12.5 MG/1; MG/1
1 TABLET ORAL DAILY
Qty: 90 TABLET | Refills: 1 | Status: SHIPPED | OUTPATIENT
Start: 2022-10-19 | End: 2023-04-19 | Stop reason: SDUPTHER

## 2022-10-19 RX ORDER — CLONIDINE HYDROCHLORIDE 0.1 MG/1
TABLET ORAL
Qty: 90 TABLET | Refills: 1 | Status: SHIPPED | OUTPATIENT
Start: 2022-10-19 | End: 2023-10-30 | Stop reason: SDUPTHER

## 2022-10-19 RX ORDER — CARVEDILOL 3.12 MG/1
TABLET ORAL
Qty: 180 TABLET | Refills: 2 | Status: SHIPPED | OUTPATIENT
Start: 2022-10-19 | End: 2023-10-30 | Stop reason: SDUPTHER

## 2022-10-19 RX ORDER — CARVEDILOL 3.12 MG/1
TABLET ORAL
Qty: 180 TABLET | Refills: 2 | Status: SHIPPED | OUTPATIENT
Start: 2022-10-19 | End: 2022-10-19 | Stop reason: SDUPTHER

## 2022-10-19 RX ORDER — TRAZODONE HYDROCHLORIDE 50 MG/1
50 TABLET ORAL NIGHTLY
Qty: 30 TABLET | Refills: 5 | Status: SHIPPED | OUTPATIENT
Start: 2022-10-19 | End: 2023-01-25 | Stop reason: SDUPTHER

## 2022-10-19 RX ORDER — SERTRALINE HYDROCHLORIDE 50 MG/1
TABLET, FILM COATED ORAL
Qty: 90 TABLET | Refills: 1 | Status: SHIPPED | OUTPATIENT
Start: 2022-10-19 | End: 2023-01-25

## 2022-10-19 RX ORDER — LOVASTATIN 20 MG/1
TABLET ORAL
Qty: 90 TABLET | Refills: 2 | Status: SHIPPED | OUTPATIENT
Start: 2022-10-19 | End: 2023-10-30 | Stop reason: SDUPTHER

## 2022-10-19 RX ORDER — DONEPEZIL HYDROCHLORIDE 5 MG/1
5 TABLET, FILM COATED ORAL NIGHTLY
Qty: 30 TABLET | Refills: 5 | Status: SHIPPED | OUTPATIENT
Start: 2022-10-19 | End: 2023-01-25 | Stop reason: SDUPTHER

## 2022-10-19 RX ORDER — POTASSIUM CHLORIDE 750 MG/1
TABLET, EXTENDED RELEASE ORAL
Qty: 360 TABLET | Refills: 1 | Status: SHIPPED | OUTPATIENT
Start: 2022-10-19

## 2022-10-22 PROBLEM — N40.0 BENIGN PROSTATIC HYPERPLASIA WITHOUT LOWER URINARY TRACT SYMPTOMS: Status: ACTIVE | Noted: 2022-10-22

## 2022-10-22 PROBLEM — F51.01 PRIMARY INSOMNIA: Status: ACTIVE | Noted: 2022-10-22

## 2022-10-22 PROBLEM — G31.84 MCI (MILD COGNITIVE IMPAIRMENT) WITH MEMORY LOSS: Status: ACTIVE | Noted: 2022-10-22

## 2022-10-22 PROBLEM — E78.2 MIXED HYPERLIPIDEMIA: Status: ACTIVE | Noted: 2022-10-22

## 2022-10-22 PROBLEM — I71.40 ABDOMINAL AORTIC ANEURYSM (AAA) WITHOUT RUPTURE, UNSPECIFIED PART: Status: ACTIVE | Noted: 2022-10-22

## 2022-10-22 PROBLEM — I71.43 INFRARENAL ABDOMINAL AORTIC ANEURYSM (AAA) WITHOUT RUPTURE: Status: ACTIVE | Noted: 2022-10-22

## 2022-10-22 PROBLEM — I10 HYPERTENSION: Status: ACTIVE | Noted: 2022-10-22

## 2022-10-22 PROBLEM — F32.9 REACTIVE DEPRESSION: Status: ACTIVE | Noted: 2022-10-22

## 2022-10-22 LAB
EKG 12-LEAD: NORMAL
PR INTERVAL: NORMAL
PRT AXES: NORMAL
QRS DURATION: NORMAL
QT/QTC: NORMAL
VENTRICULAR RATE: NORMAL

## 2022-10-22 NOTE — PROGRESS NOTES
Into the ER  SUBJECTIVE:    Patient ID: Nino Mackay is a 82 y.o. male.    Chief Complaint: Establish Care (Brought medications list, Fatigue, declined Flu vaccine, abc )    82-year-old male new patient visit.  He is accompanied by his wife who helps supplements the history.  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  He was elected to the [employer] for athletic achievements.    Today stays active by walking for exercise, wife is upset that he is a home body and does not like to leave the home.  She is quite socially active.  She has noticed he has some memory deficits as well.  While driving he forgets the directions and his location.  His wife now stays the bills.  He has trouble sleeping but melatonin has not been helpful to help him sleep.  He wakes up every 2 hours during the night.  Nocturia 4-5 times per night-will see Dr. Barron Infante urology.    Urgent care visit for dizziness-prescribed meclizine 25 mg p.r.n.    He was positive for COVID in September of are October of 2022.    He likes to drink red wine with dinner but he has cut back on the bourbon drinking nonsmoker.    He had a kidney stone extraction    2017-colonoscopy with Dr. Cottrell.    History of TURP in 2019 also.    AAA discovered and asymptomatic      No visits with results within 6 Month(s) from this visit.   Latest known visit with results is:   Office Visit on 08/20/2021   Component Date Value Ref Range Status    Color, UA 08/20/2021 Yellow   Final    pH, UA 08/20/2021 7.5   Final    WBC, UA 08/20/2021 neg   Final    Nitrite, UA 08/20/2021 neg   Final    Protein, POC 08/20/2021 neg   Final    Glucose, UA 08/20/2021 neg   Final    Ketones, UA 08/20/2021 neg   Final    Urobilinogen, UA 08/20/2021 0   Final    Bilirubin, POC 08/20/2021 0.2   Final    Blood, UA 08/20/2021 neg   Final    Clarity, UA  08/20/2021 Clear   Final    Spec Grav UA 08/20/2021 1.020   Final    POC Residual Urine Volume 08/20/2021 17  0 - 100 mL Final       Past Medical History:   Diagnosis Date    Enlarged prostate     Hypertension     Kidney stone     Urinary tract infection      Social History     Socioeconomic History    Marital status:    Tobacco Use    Smoking status: Light Smoker     Types: Cigars    Smokeless tobacco: Never    Tobacco comments:     2 cigars/day   Substance and Sexual Activity    Alcohol use: Not Currently     Comment: quit 9 months ago    Drug use: Never     Past Surgical History:   Procedure Laterality Date    COLONOSCOPY      EXTRACORPOREAL SHOCK WAVE LITHOTRIPSY Left 5/29/2019    Procedure: LITHOTRIPSY, ESWL;  Surgeon: Kobi Troncoso MD;  [facility]: [facility] OR;  Service: Urology;  Laterality: Left;    HIP SURGERY  1990    JOINT REPLACEMENT      PROSTATECTOMY  2017    TONSILLECTOMY       No family history on file.    Review of patient's allergies indicates:  No Known Allergies    Current Outpatient Medications:     amLODIPine (NORVASC) 5 MG tablet, TK 1 T PO D, Disp: , Rfl: 3    fish oil-omega-3 fatty acids 300-1,000 mg capsule, Take by mouth once daily., Disp: , Rfl:     glucosamine-chondroitin 500-400 mg tablet, Take 1 tablet by mouth 3 (three) times daily., Disp: , Rfl:     omega 3-dha-epa-fish oil (FISH OIL) 100-160-1,000 mg Cap,  1 cap, Oral, Daily, # 100 cap, 0 Refill(s), Disp: , Rfl:     aspirin (ECOTRIN) 81 MG EC tablet, aspirin 81 mg tablet,delayed release  Take 1 tablet every day by oral route., Disp: , Rfl:     carvediloL (COREG) 3.125 MG tablet, TK 1 T PO D Strength: 3.125 mg, Disp: 180 tablet, Rfl: 2    cloNIDine (CATAPRES) 0.1 MG tablet, See Instructions, 1 tablet daily as needed if systolic blood pressure [id number], # 90 tab, 3 Refill(s), Maintenance, Pharmacy: [facility] [id number] Strength: 0.1 mg, Disp: 90 tablet, Rfl: 1    donepeziL (ARICEPT) 5 MG tablet, Take 1 tablet (5 mg total)  by mouth every evening., Disp: 30 tablet, Rfl: 5    lisinopriL-hydrochlorothiazide (PRINZIDE,ZESTORETIC) 10-12.5 mg per tablet, Take 1 tablet by mouth once daily., Disp: 90 tablet, Rfl: 1    lovastatin (MEVACOR) 20 MG tablet, TK 1 T PO HS Strength: 20 mg, Disp: 90 tablet, Rfl: 2    meclizine (ANTIVERT) 25 mg tablet, Take 1 tablet (25 mg total) by mouth 3 (three) times daily as needed for Dizziness., Disp: 30 tablet, Rfl: 0    potassium chloride (KLOR-CON) 10 MEQ TbSR, TK 2 TS PO BID Strength: 10 mEq, Disp: 360 tablet, Rfl: 1    potassium chloride (MICRO-K) 10 MEQ CpSR,  See Instructions, TAKE 2 TABLETS TWICE DAILY (DOSE CHANGE), # 360 tab, 0 Refill(s), Pharmacy: [facility], 182, cm, 04/12/21 9:23:00 CDT, Height/Length Measured, 84.7, kg, 04/12/21 9:23:00 CDT, Weight Dosing, Disp: , Rfl:     sertraline (ZOLOFT) 50 MG tablet, TK 1 T PO D Strength: 50 mg, Disp: 90 tablet, Rfl: 1    tamsulosin (FLOMAX) 0.4 mg Cap, Take 2 capsules (0.8 mg total) by mouth once daily., Disp: 180 capsule, Rfl: 3    traZODone (DESYREL) 50 MG tablet, Take 1 tablet (50 mg total) by mouth every evening., Disp: 30 tablet, Rfl: 5    Review of Systems   Constitutional:  Negative for appetite change, chills, fatigue, fever and unexpected weight change.   HENT:  Negative for congestion, ear pain and trouble swallowing.    Eyes:  Negative for pain, discharge and visual disturbance.   Respiratory:  Negative for apnea, cough, shortness of breath and wheezing.    Cardiovascular:  Positive for palpitations. Negative for chest pain and leg swelling.   Gastrointestinal:  Negative for abdominal pain, blood in stool, constipation, diarrhea, nausea and vomiting.   Endocrine: Negative for cold intolerance, heat intolerance and polydipsia.   Genitourinary:  Negative for dysuria, hematuria, testicular pain and urgency.        Nocturia 4-5 times per night   Musculoskeletal:  Negative for gait problem, joint swelling and myalgias.    Neurological:  Negative for dizziness, seizures and numbness.   Psychiatric/Behavioral:  Positive for confusion. Negative for agitation, behavioral problems and hallucinations. The patient is not nervous/anxious.         Objective:      Vitals:    10/19/22 0931   BP: 130/73   Pulse: 76   Weight: 80.3 kg (177 lb)   Height: 6' (1.829 m)     Physical Exam  Vitals and nursing note reviewed.   Constitutional:       General: He is not in acute distress.     Appearance: Normal appearance. He is well-developed and normal weight. He is not toxic-appearing.   HENT:      Head: Normocephalic and atraumatic.      Right Ear: Tympanic membrane and external ear normal.      Left Ear: Tympanic membrane and external ear normal.      Nose: Nose normal.      Mouth/Throat:      Pharynx: Oropharynx is clear.   Eyes:      Pupils: Pupils are equal, round, and reactive to light.   Neck:      Thyroid: No thyromegaly.      Vascular: No carotid bruit.   Cardiovascular:      Rate and Rhythm: Normal rate and regular rhythm.      Heart sounds: Normal heart sounds. No murmur heard.     Comments: Ectopic beats are heard  Pulmonary:      Effort: Pulmonary effort is normal.      Breath sounds: Normal breath sounds. No wheezing or rales.   Abdominal:      General: Bowel sounds are normal. There is no distension.      Palpations: Abdomen is soft.      Tenderness: There is no abdominal tenderness.   Musculoskeletal:         General: No tenderness or deformity. Normal range of motion.      Cervical back: Normal range of motion and neck supple.      Lumbar back: Normal. No spasms.      Comments: Bends 90 degrees at  waist   Lymphadenopathy:      Cervical: No cervical adenopathy.   Skin:     General: Skin is warm and dry.      Findings: No rash.   Neurological:      Mental Status: He is alert and oriented to person, place, and time.      Cranial Nerves: No cranial nerve deficit.      Coordination: Coordination normal.   Psychiatric:         Behavior:  Behavior normal.         Thought Content: Thought content normal.         Judgment: Judgment normal.         Assessment:       1. Hypertension, unspecified type    2. Mixed hyperlipidemia    3. Benign prostatic hyperplasia without lower urinary tract symptoms    4. Reactive depression    5. Cardiac arrhythmia, unspecified cardiac arrhythmia type    6. Primary insomnia    7. MCI (mild cognitive impairment) with memory loss    8. Special screening for malignant neoplasm of prostate    9. Infrarenal abdominal aortic aneurysm (AAA) without rupture           Plan:       Hypertension, unspecified type  -     Discontinue: carvediloL (COREG) 3.125 MG tablet; TK 1 T PO D  Strength: 3.125 mg  Dispense: 180 tablet; Refill: 2  -     lisinopriL-hydrochlorothiazide (PRINZIDE,ZESTORETIC) 10-12.5 mg per tablet; Take 1 tablet by mouth once daily.  Dispense: 90 tablet; Refill: 1  -     potassium chloride (KLOR-CON) 10 MEQ TbSR; TK 2 TS PO BID Strength: 10 mEq  Dispense: 360 tablet; Refill: 1  -     cloNIDine (CATAPRES) 0.1 MG tablet; See Instructions, 1 tablet daily as needed if systolic blood pressure [id number], # 90 tab, 3 Refill(s), Maintenance, Pharmacy: [facility] [id number] Strength: 0.1 mg  Dispense: 90 tablet; Refill: 1  -     carvediloL (COREG) 3.125 MG tablet; TK 1 T PO D Strength: 3.125 mg  Dispense: 180 tablet; Refill: 2  -     CBC Auto Differential; Future; Expected date: 10/19/2022  -     Comprehensive Metabolic Panel; Future; Expected date: 10/19/2022  -     Lipid Panel; Future; Expected date: 10/19/2022  -     Urinalysis, Reflex to Urine Culture Urine, Clean Catch; Future; Expected date: 10/19/2022  -     PSA, Screening; Future; Expected date: 10/19/2022  -     TSH; Future; Expected date: 10/19/2022  Blood pressure under good control at this time.  Will or baseline lab work as well.  Mixed hyperlipidemia  -     lovastatin (MEVACOR) 20 MG tablet; TK 1 T PO HS Strength: 20 mg  Dispense: 90 tablet; Refill: 2  Lipid  panel pending  Benign prostatic hyperplasia without lower urinary tract symptoms  -     tamsulosin (FLOMAX) 0.4 mg Cap; Take 2 capsules (0.8 mg total) by mouth once daily.  Dispense: 180 capsule; Refill: 3  Still has significant nocturia  Reactive depression  -     sertraline (ZOLOFT) 50 MG tablet; TK 1 T PO D Strength: 50 mg  Dispense: 90 tablet; Refill: 1    Cardiac arrhythmia, unspecified cardiac arrhythmia type  -     POCT EKG 12-LEAD (NOT FOR [facility] USE)  EKG shows ventricular rate 75 undetermined rhythm with first-degree AV block multiple PVCs are noted  Primary insomnia  -     traZODone (DESYREL) 50 MG tablet; Take 1 tablet (50 mg total) by mouth every evening.  Dispense: 30 tablet; Refill: 5  Add trazodone 50 mg HS for sleep  MCI (mild cognitive impairment) with memory loss  -     donepeziL (ARICEPT) 5 MG tablet; Take 1 tablet (5 mg total) by mouth every evening.  Dispense: 30 tablet; Refill: 5  Mini-mental status exam today scored 17/30, showing mild-to-moderate memory loss.  He is agreeable to try donepezil 5 mg nightly  Special screening for malignant neoplasm of prostate  -     PSA, Screening; Future; Expected date: 10/19/2022  PSA ordered  Infrarenal abdominal aortic aneurysm (AAA) without rupture  Will need repeat ultrasounds yearly.  Follow up in about 3 months (around 1/19/2023), or damien- memory loss.        10/22/2022 Yonas Alaniz

## 2022-10-25 ENCOUNTER — LAB VISIT (OUTPATIENT)
Dept: LAB | Facility: HOSPITAL | Age: 82
End: 2022-10-25
Attending: FAMILY MEDICINE
Payer: COMMERCIAL

## 2022-10-25 DIAGNOSIS — I10 HYPERTENSION, UNSPECIFIED TYPE: ICD-10-CM

## 2022-10-25 DIAGNOSIS — Z12.5 SPECIAL SCREENING FOR MALIGNANT NEOPLASM OF PROSTATE: ICD-10-CM

## 2022-10-25 LAB
ALBUMIN SERPL BCP-MCNC: 3.8 G/DL (ref 3.5–5.2)
ALP SERPL-CCNC: 55 U/L (ref 55–135)
ALT SERPL W/O P-5'-P-CCNC: 17 U/L (ref 10–44)
ANION GAP SERPL CALC-SCNC: 11 MMOL/L (ref 8–16)
AST SERPL-CCNC: 21 U/L (ref 10–40)
BASOPHILS # BLD AUTO: 0.04 K/UL (ref 0–0.2)
BASOPHILS NFR BLD: 0.9 % (ref 0–1.9)
BILIRUB SERPL-MCNC: 1.1 MG/DL (ref 0.1–1)
BUN SERPL-MCNC: 14 MG/DL (ref 8–23)
CALCIUM SERPL-MCNC: 9 MG/DL (ref 8.7–10.5)
CHLORIDE SERPL-SCNC: 105 MMOL/L (ref 95–110)
CHOLEST SERPL-MCNC: 161 MG/DL (ref 120–199)
CHOLEST/HDLC SERPL: 2.3 {RATIO} (ref 2–5)
CO2 SERPL-SCNC: 25 MMOL/L (ref 23–29)
COMPLEXED PSA SERPL-MCNC: 0.91 NG/ML (ref 0–4)
CREAT SERPL-MCNC: 1 MG/DL (ref 0.5–1.4)
DIFFERENTIAL METHOD: ABNORMAL
EOSINOPHIL # BLD AUTO: 0.2 K/UL (ref 0–0.5)
EOSINOPHIL NFR BLD: 4.1 % (ref 0–8)
ERYTHROCYTE [DISTWIDTH] IN BLOOD BY AUTOMATED COUNT: 12.7 % (ref 11.5–14.5)
EST. GFR  (NO RACE VARIABLE): >60 ML/MIN/1.73 M^2
GLUCOSE SERPL-MCNC: 96 MG/DL (ref 70–110)
HCT VFR BLD AUTO: 42.3 % (ref 40–54)
HDLC SERPL-MCNC: 69 MG/DL (ref 40–75)
HDLC SERPL: 42.9 % (ref 20–50)
HGB BLD-MCNC: 14.7 G/DL (ref 14–18)
IMM GRANULOCYTES # BLD AUTO: 0 K/UL (ref 0–0.04)
IMM GRANULOCYTES NFR BLD AUTO: 0 % (ref 0–0.5)
LDLC SERPL CALC-MCNC: 72.8 MG/DL (ref 63–159)
LYMPHOCYTES # BLD AUTO: 1.6 K/UL (ref 1–4.8)
LYMPHOCYTES NFR BLD: 34 % (ref 18–48)
MCH RBC QN AUTO: 33.9 PG (ref 27–31)
MCHC RBC AUTO-ENTMCNC: 34.8 G/DL (ref 32–36)
MCV RBC AUTO: 98 FL (ref 82–98)
MONOCYTES # BLD AUTO: 0.5 K/UL (ref 0.3–1)
MONOCYTES NFR BLD: 11.3 % (ref 4–15)
NEUTROPHILS # BLD AUTO: 2.3 K/UL (ref 1.8–7.7)
NEUTROPHILS NFR BLD: 49.7 % (ref 38–73)
NONHDLC SERPL-MCNC: 92 MG/DL
NRBC BLD-RTO: 0 /100 WBC
PLATELET # BLD AUTO: 211 K/UL (ref 150–450)
PMV BLD AUTO: 9.4 FL (ref 9.2–12.9)
POTASSIUM SERPL-SCNC: 3.8 MMOL/L (ref 3.5–5.1)
PROT SERPL-MCNC: 6.7 G/DL (ref 6–8.4)
RBC # BLD AUTO: 4.34 M/UL (ref 4.6–6.2)
SODIUM SERPL-SCNC: 141 MMOL/L (ref 136–145)
TRIGL SERPL-MCNC: 96 MG/DL (ref 30–150)
TSH SERPL DL<=0.005 MIU/L-ACNC: 1.7 UIU/ML (ref 0.4–4)
WBC # BLD AUTO: 4.62 K/UL (ref 3.9–12.7)

## 2022-10-25 PROCEDURE — 36415 COLL VENOUS BLD VENIPUNCTURE: CPT | Performed by: FAMILY MEDICINE

## 2022-10-25 PROCEDURE — 85025 COMPLETE CBC W/AUTO DIFF WBC: CPT | Performed by: FAMILY MEDICINE

## 2022-10-25 PROCEDURE — 80053 COMPREHEN METABOLIC PANEL: CPT | Performed by: FAMILY MEDICINE

## 2022-10-25 PROCEDURE — 80061 LIPID PANEL: CPT | Performed by: FAMILY MEDICINE

## 2022-10-25 PROCEDURE — 84153 ASSAY OF PSA TOTAL: CPT | Performed by: FAMILY MEDICINE

## 2022-10-25 PROCEDURE — 84443 ASSAY THYROID STIM HORMONE: CPT | Performed by: FAMILY MEDICINE

## 2022-10-31 NOTE — PROGRESS NOTES
Call patient.  His lab work looks excellent.  Prostate normal with a PSA of 0.91 cholesterol excellent at 161.  Rest of the lab work completely normal.  Continue current medications

## 2022-11-01 ENCOUNTER — TELEPHONE (OUTPATIENT)
Dept: FAMILY MEDICINE | Facility: CLINIC | Age: 82
End: 2022-11-01

## 2022-11-03 ENCOUNTER — TELEPHONE (OUTPATIENT)
Dept: FAMILY MEDICINE | Facility: CLINIC | Age: 82
End: 2022-11-03

## 2022-11-03 NOTE — TELEPHONE ENCOUNTER
Patient wife notified of  lab results.Ms. De acknowledge understanding. Patient has an apt tomorrow with Dr. Infante his urologist and wanted to know should we send over the lab results?

## 2022-11-03 NOTE — TELEPHONE ENCOUNTER
----- Message from Katherine Kulkarni sent at 11/3/2022  1:40 PM CDT -----  Vm- wife is calling martha gonzales   [phone]

## 2022-11-03 NOTE — TELEPHONE ENCOUNTER
----- Message from Kelly Laura MA sent at 11/3/2022  9:04 AM CDT -----  Pt returning Janis's call. [phone]

## 2022-11-04 ENCOUNTER — OFFICE VISIT (OUTPATIENT)
Dept: UROLOGY | Facility: CLINIC | Age: 82
End: 2022-11-04
Payer: COMMERCIAL

## 2022-11-04 VITALS
BODY MASS INDEX: 24.38 KG/M2 | HEART RATE: 75 BPM | WEIGHT: 180 LBS | HEIGHT: 72 IN | SYSTOLIC BLOOD PRESSURE: 130 MMHG | DIASTOLIC BLOOD PRESSURE: 72 MMHG

## 2022-11-04 DIAGNOSIS — R39.9 LOWER URINARY TRACT SYMPTOMS (LUTS): Primary | ICD-10-CM

## 2022-11-04 PROCEDURE — 99214 PR OFFICE/OUTPT VISIT, EST, LEVL IV, 30-39 MIN: ICD-10-PCS | Mod: S$GLB,,, | Performed by: UROLOGY

## 2022-11-04 PROCEDURE — 1159F MED LIST DOCD IN RCRD: CPT | Mod: CPTII,S$GLB,, | Performed by: UROLOGY

## 2022-11-04 PROCEDURE — 99999 PR PBB SHADOW E&M-EST. PATIENT-LVL III: ICD-10-PCS | Mod: PBBFAC,,, | Performed by: UROLOGY

## 2022-11-04 PROCEDURE — 3075F PR MOST RECENT SYSTOLIC BLOOD PRESS GE 130-139MM HG: ICD-10-PCS | Mod: CPTII,S$GLB,, | Performed by: UROLOGY

## 2022-11-04 PROCEDURE — 1126F AMNT PAIN NOTED NONE PRSNT: CPT | Mod: CPTII,S$GLB,, | Performed by: UROLOGY

## 2022-11-04 PROCEDURE — 3288F PR FALLS RISK ASSESSMENT DOCUMENTED: ICD-10-PCS | Mod: CPTII,S$GLB,, | Performed by: UROLOGY

## 2022-11-04 PROCEDURE — 3078F PR MOST RECENT DIASTOLIC BLOOD PRESSURE < 80 MM HG: ICD-10-PCS | Mod: CPTII,S$GLB,, | Performed by: UROLOGY

## 2022-11-04 PROCEDURE — 1101F PR PT FALLS ASSESS DOC 0-1 FALLS W/OUT INJ PAST YR: ICD-10-PCS | Mod: CPTII,S$GLB,, | Performed by: UROLOGY

## 2022-11-04 PROCEDURE — 1160F PR REVIEW ALL MEDS BY PRESCRIBER/CLIN PHARMACIST DOCUMENTED: ICD-10-PCS | Mod: CPTII,S$GLB,, | Performed by: UROLOGY

## 2022-11-04 PROCEDURE — 1160F RVW MEDS BY RX/DR IN RCRD: CPT | Mod: CPTII,S$GLB,, | Performed by: UROLOGY

## 2022-11-04 PROCEDURE — 1126F PR PAIN SEVERITY QUANTIFIED, NO PAIN PRESENT: ICD-10-PCS | Mod: CPTII,S$GLB,, | Performed by: UROLOGY

## 2022-11-04 PROCEDURE — 3078F DIAST BP <80 MM HG: CPT | Mod: CPTII,S$GLB,, | Performed by: UROLOGY

## 2022-11-04 PROCEDURE — 3075F SYST BP GE 130 - 139MM HG: CPT | Mod: CPTII,S$GLB,, | Performed by: UROLOGY

## 2022-11-04 PROCEDURE — 99214 OFFICE O/P EST MOD 30 MIN: CPT | Mod: S$GLB,,, | Performed by: UROLOGY

## 2022-11-04 PROCEDURE — 3288F FALL RISK ASSESSMENT DOCD: CPT | Mod: CPTII,S$GLB,, | Performed by: UROLOGY

## 2022-11-04 PROCEDURE — 99999 PR PBB SHADOW E&M-EST. PATIENT-LVL III: CPT | Mod: PBBFAC,,, | Performed by: UROLOGY

## 2022-11-04 PROCEDURE — 1159F PR MEDICATION LIST DOCUMENTED IN MEDICAL RECORD: ICD-10-PCS | Mod: CPTII,S$GLB,, | Performed by: UROLOGY

## 2022-11-04 PROCEDURE — 1101F PT FALLS ASSESS-DOCD LE1/YR: CPT | Mod: CPTII,S$GLB,, | Performed by: UROLOGY

## 2022-11-04 NOTE — PROGRESS NOTES
[facility] Established Patient/H&P:    Nino Mackay is a 82 y.o. male who presents for follow up for lower urinary tract symptoms.     Patient with a several year history lower urinary tract symptoms managed by Dr. Troncoso. On review of records, he has also had recurrent urinary tract infection and nephrolithiasis s/p ESWL.      He reports frequency, intermittency, weak stream and straining that is not bothersome. He is managed on Flomax 0.8 mg PO daily. Not bothered by his symptoms. He drinks mostly water and red wine. Restricts his nighttime fluid intake.     He is s/p TURP approximately in 2017 complicated by severe gross hematuria requiring admission at [place].    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        Interval History    11/4/22: He presents today for follow up for his lower urinary tract symptoms. He reports frequency, intermittency, urgency, straining and nocturia x 5. His IPSS is up slightly to 20. States he is not bothered by his symptoms. Remains on Flomax 0.8 mg PO daily.     UA negative on 10/25/22.      Denies any fever, chills, flank pain, bone pain, unintentional weight loss,  trauma or history of  malignancy.         IPSS    QoL  20 2 11/4/22  15        2          8/20/21     PVR  17 mL              8/20/21     PSA  0.91  10/25/22  1.3                   4/11/19       Past Medical History:   Diagnosis Date    Enlarged prostate     Hypertension     Kidney stone     Urinary tract infection        Past Surgical History:   Procedure Laterality Date    COLONOSCOPY      EXTRACORPOREAL SHOCK WAVE LITHOTRIPSY Left 5/29/2019    Procedure: LITHOTRIPSY, ESWL;  Surgeon: Kobi Troncoso MD;  Location: [facility] OR;  Service: Urology;  Laterality: Left;    HIP SURGERY  1990    JOINT REPLACEMENT      PROSTATECTOMY  2017    TONSILLECTOMY         Review of patient's allergies indicates:  No Known Allergies    Medications Reviewed: see MAR    FOCUSED PHYSICAL  EXAM:    Vitals:    11/04/22 1120   BP: 130/72   Pulse: 75     Body mass index is 24.41 kg/m². Weight: 81.6 kg (180 lb) Height: 6' (182.9 cm)       General: Alert, cooperative, no distress, appears stated age  Abdomen: Soft, non-tender, no CVA tenderness, non-distended  PEACE: Declined      LABS:    Recent Results (from the past 336 hour(s))   POCT EKG 12-LEAD (NOT FOR [facility] USE)    Collection Time: 10/22/22  1:29 PM   Result Value Ref Range    EKG 12-Lead      Ventricular Rate      ND Interval      QRS Duration      QT/QTc      PRT Axes     Urinalysis, Reflex to Urine Culture Urine, Clean Catch    Collection Time: 10/25/22 10:09 AM    Specimen: Urine   Result Value Ref Range    Specimen UA Urine, Clean Catch     Color, UA Yellow Yellow, Straw, Cleopatra    Appearance, UA Clear Clear    pH, UA 8.0 5.0 - 8.0    Specific Gravity, UA 1.015 1.005 - 1.030    Protein, UA Negative Negative    Glucose, UA Negative Negative    Ketones, UA Negative Negative    Bilirubin (UA) Negative Negative    Occult Blood UA Negative Negative    Nitrite, UA Negative Negative    Urobilinogen, UA Negative Negative EU/dL    Leukocytes, UA Negative Negative   CBC Auto Differential    Collection Time: 10/25/22 10:11 AM   Result Value Ref Range    WBC 4.62 3.90 - 12.70 K/uL    RBC 4.34 (L) 4.60 - 6.20 M/uL    Hemoglobin 14.7 14.0 - 18.0 g/dL    Hematocrit 42.3 40.0 - 54.0 %    MCV 98 82 - 98 fL    MCH 33.9 (H) 27.0 - 31.0 pg    MCHC 34.8 32.0 - 36.0 g/dL    RDW 12.7 11.5 - 14.5 %    Platelets 211 150 - 450 K/uL    MPV 9.4 9.2 - 12.9 fL    Immature Granulocytes 0.0 0.0 - 0.5 %    Gran # (ANC) 2.3 1.8 - 7.7 K/uL    Immature Grans (Abs) 0.00 0.00 - 0.04 K/uL    Lymph # 1.6 1.0 - 4.8 K/uL    Mono # 0.5 0.3 - 1.0 K/uL    Eos # 0.2 0.0 - 0.5 K/uL    Baso # 0.04 0.00 - 0.20 K/uL    nRBC 0 0 /100 WBC    Gran % 49.7 38.0 - 73.0 %    Lymph % 34.0 18.0 - 48.0 %    Mono % 11.3 4.0 - 15.0 %    Eosinophil % 4.1 0.0 - 8.0 %    Basophil % 0.9 0.0 - 1.9 %     Differential Method Automated    Comprehensive Metabolic Panel    Collection Time: 10/25/22 10:11 AM   Result Value Ref Range    Sodium 141 136 - 145 mmol/L    Potassium 3.8 3.5 - 5.1 mmol/L    Chloride 105 95 - 110 mmol/L    CO2 25 23 - 29 mmol/L    Glucose 96 70 - 110 mg/dL    BUN 14 8 - 23 mg/dL    Creatinine 1.0 0.5 - 1.4 mg/dL    Calcium 9.0 8.7 - 10.5 mg/dL    Total Protein 6.7 6.0 - 8.4 g/dL    Albumin 3.8 3.5 - 5.2 g/dL    Total Bilirubin 1.1 (H) 0.1 - 1.0 mg/dL    Alkaline Phosphatase 55 55 - 135 U/L    AST 21 10 - 40 U/L    ALT 17 10 - 44 U/L    Anion Gap 11 8 - 16 mmol/L    eGFR >60.0 >60 mL/min/1.73 m^2   Lipid Panel    Collection Time: 10/25/22 10:11 AM   Result Value Ref Range    Cholesterol 161 120 - 199 mg/dL    Triglycerides 96 30 - 150 mg/dL    HDL 69 40 - 75 mg/dL    LDL Cholesterol 72.8 63.0 - 159.0 mg/dL    HDL/Cholesterol Ratio 42.9 20.0 - 50.0 %    Total Cholesterol/HDL Ratio 2.3 2.0 - 5.0    Non-HDL Cholesterol 92 mg/dL   PSA, Screening    Collection Time: 10/25/22 10:11 AM   Result Value Ref Range    PSA, Screen 0.91 0.00 - 4.00 ng/mL   TSH    Collection Time: 10/25/22 10:11 AM   Result Value Ref Range    TSH 1.700 0.400 - 4.000 uIU/mL         Assessment/Diagnosis:    1. Lower urinary tract symptoms (LUTS)            Plans:    - I spent 30 minutes of the day of this encounter preparing for, treating and managing this patient. Extensive discussion with patient regarding the etiology and management of his lower urinary tract symptoms. Explained that LUTS are multifactorial and can be secondary to an enlarged prostate, PO intake of bladder irritants, overactive bladder, constipation, malignancy, trauma, infection, stones or medications.   - Continue Flomax 0.8 mg PO daily.   - RTC in 1 year with symptom score.

## 2023-01-12 ENCOUNTER — TELEPHONE (OUTPATIENT)
Dept: FAMILY MEDICINE | Facility: CLINIC | Age: 83
End: 2023-01-12

## 2023-01-12 DIAGNOSIS — Z79.899 ENCOUNTER FOR LONG-TERM (CURRENT) USE OF OTHER MEDICATIONS: ICD-10-CM

## 2023-01-12 DIAGNOSIS — E78.2 MIXED HYPERLIPIDEMIA: Primary | ICD-10-CM

## 2023-01-12 NOTE — TELEPHONE ENCOUNTER
Left message on voice mail in regards to pre-visit labs. Verbalized on voice mail that we placed order for the pt to have done for up coming office visit on 01/25/2023 and  that the order is through [email or web address]. No appointment is needed, just make sure that you are fasting for the blood work.

## 2023-01-14 ENCOUNTER — PATIENT MESSAGE (OUTPATIENT)
Dept: FAMILY MEDICINE | Facility: CLINIC | Age: 83
End: 2023-01-14

## 2023-01-14 DIAGNOSIS — Z79.899 ENCOUNTER FOR LONG-TERM (CURRENT) USE OF OTHER MEDICATIONS: Primary | ICD-10-CM

## 2023-01-14 DIAGNOSIS — E78.2 MIXED HYPERLIPIDEMIA: ICD-10-CM

## 2023-01-14 DIAGNOSIS — I10 HYPERTENSION, UNSPECIFIED TYPE: ICD-10-CM

## 2023-01-17 ENCOUNTER — PATIENT MESSAGE (OUTPATIENT)
Dept: FAMILY MEDICINE | Facility: CLINIC | Age: 83
End: 2023-01-17

## 2023-01-23 ENCOUNTER — LAB VISIT (OUTPATIENT)
Dept: LAB | Facility: HOSPITAL | Age: 83
End: 2023-01-23
Attending: FAMILY MEDICINE
Payer: MEDICARE

## 2023-01-23 DIAGNOSIS — E78.2 MIXED HYPERLIPIDEMIA: ICD-10-CM

## 2023-01-23 DIAGNOSIS — I10 HYPERTENSION, UNSPECIFIED TYPE: ICD-10-CM

## 2023-01-23 DIAGNOSIS — Z79.899 ENCOUNTER FOR LONG-TERM (CURRENT) USE OF OTHER MEDICATIONS: ICD-10-CM

## 2023-01-23 LAB
ALBUMIN SERPL BCP-MCNC: 4.1 G/DL (ref 3.5–5.2)
ALP SERPL-CCNC: 56 U/L (ref 55–135)
ALT SERPL W/O P-5'-P-CCNC: 21 U/L (ref 10–44)
ANION GAP SERPL CALC-SCNC: 10 MMOL/L (ref 8–16)
AST SERPL-CCNC: 21 U/L (ref 10–40)
BILIRUB SERPL-MCNC: 0.9 MG/DL (ref 0.1–1)
BUN SERPL-MCNC: 15 MG/DL (ref 8–23)
CALCIUM SERPL-MCNC: 9.4 MG/DL (ref 8.7–10.5)
CHLORIDE SERPL-SCNC: 104 MMOL/L (ref 95–110)
CHOLEST SERPL-MCNC: 169 MG/DL (ref 120–199)
CHOLEST/HDLC SERPL: 2.2 {RATIO} (ref 2–5)
CO2 SERPL-SCNC: 27 MMOL/L (ref 23–29)
CREAT SERPL-MCNC: 1.2 MG/DL (ref 0.5–1.4)
EST. GFR  (NO RACE VARIABLE): >60 ML/MIN/1.73 M^2
GLUCOSE SERPL-MCNC: 109 MG/DL (ref 70–110)
HDLC SERPL-MCNC: 78 MG/DL (ref 40–75)
HDLC SERPL: 46.2 % (ref 20–50)
LDLC SERPL CALC-MCNC: 68.4 MG/DL (ref 63–159)
NONHDLC SERPL-MCNC: 91 MG/DL
POTASSIUM SERPL-SCNC: 4 MMOL/L (ref 3.5–5.1)
PROT SERPL-MCNC: 7.1 G/DL (ref 6–8.4)
SODIUM SERPL-SCNC: 141 MMOL/L (ref 136–145)
TRIGL SERPL-MCNC: 113 MG/DL (ref 30–150)

## 2023-01-23 PROCEDURE — 36415 COLL VENOUS BLD VENIPUNCTURE: CPT | Performed by: NURSE PRACTITIONER

## 2023-01-23 PROCEDURE — 80053 COMPREHEN METABOLIC PANEL: CPT | Performed by: NURSE PRACTITIONER

## 2023-01-23 PROCEDURE — 80061 LIPID PANEL: CPT | Performed by: NURSE PRACTITIONER

## 2023-01-25 ENCOUNTER — TELEPHONE (OUTPATIENT)
Dept: FAMILY MEDICINE | Facility: CLINIC | Age: 83
End: 2023-01-25

## 2023-01-25 ENCOUNTER — OFFICE VISIT (OUTPATIENT)
Dept: FAMILY MEDICINE | Facility: CLINIC | Age: 83
End: 2023-01-25
Payer: MEDICARE

## 2023-01-25 VITALS
HEART RATE: 78 BPM | OXYGEN SATURATION: 99 % | DIASTOLIC BLOOD PRESSURE: 64 MMHG | SYSTOLIC BLOOD PRESSURE: 122 MMHG | HEIGHT: 72 IN | BODY MASS INDEX: 24.38 KG/M2 | WEIGHT: 180 LBS

## 2023-01-25 DIAGNOSIS — F51.01 PRIMARY INSOMNIA: ICD-10-CM

## 2023-01-25 DIAGNOSIS — G31.84 MCI (MILD COGNITIVE IMPAIRMENT): Primary | ICD-10-CM

## 2023-01-25 DIAGNOSIS — I10 PRIMARY HYPERTENSION: ICD-10-CM

## 2023-01-25 DIAGNOSIS — E78.2 MIXED HYPERLIPIDEMIA: ICD-10-CM

## 2023-01-25 PROCEDURE — 3078F DIAST BP <80 MM HG: CPT | Mod: CPTII,S$GLB,, | Performed by: NURSE PRACTITIONER

## 2023-01-25 PROCEDURE — 99214 PR OFFICE/OUTPT VISIT, EST, LEVL IV, 30-39 MIN: ICD-10-PCS | Mod: S$GLB,,, | Performed by: NURSE PRACTITIONER

## 2023-01-25 PROCEDURE — 1160F RVW MEDS BY RX/DR IN RCRD: CPT | Mod: CPTII,S$GLB,, | Performed by: NURSE PRACTITIONER

## 2023-01-25 PROCEDURE — 1159F MED LIST DOCD IN RCRD: CPT | Mod: CPTII,S$GLB,, | Performed by: NURSE PRACTITIONER

## 2023-01-25 PROCEDURE — 1159F PR MEDICATION LIST DOCUMENTED IN MEDICAL RECORD: ICD-10-PCS | Mod: CPTII,S$GLB,, | Performed by: NURSE PRACTITIONER

## 2023-01-25 PROCEDURE — 99214 OFFICE O/P EST MOD 30 MIN: CPT | Mod: S$GLB,,, | Performed by: NURSE PRACTITIONER

## 2023-01-25 PROCEDURE — 1160F PR REVIEW ALL MEDS BY PRESCRIBER/CLIN PHARMACIST DOCUMENTED: ICD-10-PCS | Mod: CPTII,S$GLB,, | Performed by: NURSE PRACTITIONER

## 2023-01-25 PROCEDURE — 3074F PR MOST RECENT SYSTOLIC BLOOD PRESSURE < 130 MM HG: ICD-10-PCS | Mod: CPTII,S$GLB,, | Performed by: NURSE PRACTITIONER

## 2023-01-25 PROCEDURE — 3078F PR MOST RECENT DIASTOLIC BLOOD PRESSURE < 80 MM HG: ICD-10-PCS | Mod: CPTII,S$GLB,, | Performed by: NURSE PRACTITIONER

## 2023-01-25 PROCEDURE — 3074F SYST BP LT 130 MM HG: CPT | Mod: CPTII,S$GLB,, | Performed by: NURSE PRACTITIONER

## 2023-01-25 RX ORDER — DONEPEZIL HYDROCHLORIDE 5 MG/1
5 TABLET, FILM COATED ORAL NIGHTLY
Qty: 90 TABLET | Refills: 3 | Status: SHIPPED | OUTPATIENT
Start: 2023-01-25 | End: 2023-02-27

## 2023-01-25 RX ORDER — TRAZODONE HYDROCHLORIDE 50 MG/1
50 TABLET ORAL NIGHTLY
Qty: 90 TABLET | Refills: 3 | Status: SHIPPED | OUTPATIENT
Start: 2023-01-25 | End: 2023-05-15 | Stop reason: SDUPTHER

## 2023-01-25 NOTE — PROGRESS NOTES
SUBJECTIVE:    Patient ID: Nino Mackay is a 82 y.o. male.    Chief Complaint: Hypertension (No bottles//Pt is here for a check up//Pt need a 90 days supply for Trazodone and donepezil sent to his mail order pharm.//[facility]ANNE )    Patient here for regular follow-up- MCI, HTN, lipids. Pt here with his wife.    Seen by Dr. Alaniz at last visit as new patient.  Wife had expressed some concerns regarding memory and he scored 17/30 on MMSE and donepezil 5 mg added at last visit. Both pt and wife report today they do feel the donepezil has helped. Family is asking about the newly approved alzheimers medication and/or neuro referral.    Pt denies complaints today.  reports sleeping better with trazodone. Wife denies any behavior/safety issues.  Appetite is good.  Patient is no longer driving.  Spends his day reading or playing music    Wife reports patient was previously prescribed sertraline from his former PCP though he has never taken it, request removal from med list.  Denies any concerns regarding anxiety or depression        Lab Visit on 01/23/2023   Component Date Value Ref Range Status    Sodium 01/23/2023 141  136 - 145 mmol/L Final    Potassium 01/23/2023 4.0  3.5 - 5.1 mmol/L Final    Chloride 01/23/2023 104  95 - 110 mmol/L Final    CO2 01/23/2023 27  23 - 29 mmol/L Final    Glucose 01/23/2023 109  70 - 110 mg/dL Final    BUN 01/23/2023 15  8 - 23 mg/dL Final    Creatinine 01/23/2023 1.2  0.5 - 1.4 mg/dL Final    Calcium 01/23/2023 9.4  8.7 - 10.5 mg/dL Final    Total Protein 01/23/2023 7.1  6.0 - 8.4 g/dL Final    Albumin 01/23/2023 4.1  3.5 - 5.2 g/dL Final    Total Bilirubin 01/23/2023 0.9  0.1 - 1.0 mg/dL Final    Alkaline Phosphatase 01/23/2023 56  55 - 135 U/L Final    AST 01/23/2023 21  10 - 40 U/L Final    ALT 01/23/2023 21  10 - 44 U/L Final    Anion Gap 01/23/2023 10  8 - 16 mmol/L Final    eGFR 01/23/2023 >60.0  >60 mL/min/1.73 m^2 Final    Cholesterol 01/23/2023 169  120 - 199 mg/dL Final     Triglycerides 01/23/2023 113  30 - 150 mg/dL Final    HDL 01/23/2023 78 (H)  40 - 75 mg/dL Final    LDL Cholesterol 01/23/2023 68.4  63.0 - 159.0 mg/dL Final    HDL/Cholesterol Ratio 01/23/2023 46.2  20.0 - 50.0 % Final    Total Cholesterol/HDL Ratio 01/23/2023 2.2  2.0 - 5.0 Final    Non-HDL Cholesterol 01/23/2023 91  mg/dL Final   Lab Visit on 10/25/2022   Component Date Value Ref Range Status    Specimen UA 10/25/2022 Urine, Clean Catch   Final    Color, UA 10/25/2022 Yellow  Yellow, Straw, Cleopatra Final    Appearance, UA 10/25/2022 Clear  Clear Final    pH, UA 10/25/2022 8.0  5.0 - 8.0 Final    Specific Gravity, UA 10/25/2022 1.015  1.005 - 1.030 Final    Protein, UA 10/25/2022 Negative  Negative Final    Glucose, UA 10/25/2022 Negative  Negative Final    Ketones, UA 10/25/2022 Negative  Negative Final    Bilirubin (UA) 10/25/2022 Negative  Negative Final    Occult Blood UA 10/25/2022 Negative  Negative Final    Nitrite, UA 10/25/2022 Negative  Negative Final    Urobilinogen, UA 10/25/2022 Negative  Negative EU/dL Final    Leukocytes, UA 10/25/2022 Negative  Negative Final   Lab Visit on 10/25/2022   Component Date Value Ref Range Status    WBC 10/25/2022 4.62  3.90 - 12.70 K/uL Final    RBC 10/25/2022 4.34 (L)  4.60 - 6.20 M/uL Final    Hemoglobin 10/25/2022 14.7  14.0 - 18.0 g/dL Final    Hematocrit 10/25/2022 42.3  40.0 - 54.0 % Final    MCV 10/25/2022 98  82 - 98 fL Final    MCH 10/25/2022 33.9 (H)  27.0 - 31.0 pg Final    MCHC 10/25/2022 34.8  32.0 - 36.0 g/dL Final    RDW 10/25/2022 12.7  11.5 - 14.5 % Final    Platelets 10/25/2022 211  150 - 450 K/uL Final    MPV 10/25/2022 9.4  9.2 - 12.9 fL Final    Immature Granulocytes 10/25/2022 0.0  0.0 - 0.5 % Final    Gran # (ANC) 10/25/2022 2.3  1.8 - 7.7 K/uL Final    Immature Grans (Abs) 10/25/2022 0.00  0.00 - 0.04 K/uL Final    Lymph # 10/25/2022 1.6  1.0 - 4.8 K/uL Final    Mono # 10/25/2022 0.5  0.3 - 1.0 K/uL Final    Eos # 10/25/2022 0.2  0.0 - 0.5 K/uL  Final    Baso # 10/25/2022 0.04  0.00 - 0.20 K/uL Final    nRBC 10/25/2022 0  0 /100 WBC Final    Gran % 10/25/2022 49.7  38.0 - 73.0 % Final    Lymph % 10/25/2022 34.0  18.0 - 48.0 % Final    Mono % 10/25/2022 11.3  4.0 - 15.0 % Final    Eosinophil % 10/25/2022 4.1  0.0 - 8.0 % Final    Basophil % 10/25/2022 0.9  0.0 - 1.9 % Final    Differential Method 10/25/2022 Automated   Final    Sodium 10/25/2022 141  136 - 145 mmol/L Final    Potassium 10/25/2022 3.8  3.5 - 5.1 mmol/L Final    Chloride 10/25/2022 105  95 - 110 mmol/L Final    CO2 10/25/2022 25  23 - 29 mmol/L Final    Glucose 10/25/2022 96  70 - 110 mg/dL Final    BUN 10/25/2022 14  8 - 23 mg/dL Final    Creatinine 10/25/2022 1.0  0.5 - 1.4 mg/dL Final    Calcium 10/25/2022 9.0  8.7 - 10.5 mg/dL Final    Total Protein 10/25/2022 6.7  6.0 - 8.4 g/dL Final    Albumin 10/25/2022 3.8  3.5 - 5.2 g/dL Final    Total Bilirubin 10/25/2022 1.1 (H)  0.1 - 1.0 mg/dL Final    Alkaline Phosphatase 10/25/2022 55  55 - 135 U/L Final    AST 10/25/2022 21  10 - 40 U/L Final    ALT 10/25/2022 17  10 - 44 U/L Final    Anion Gap 10/25/2022 11  8 - 16 mmol/L Final    eGFR 10/25/2022 >60.0  >60 mL/min/1.73 m^2 Final    Cholesterol 10/25/2022 161  120 - 199 mg/dL Final    Triglycerides 10/25/2022 96  30 - 150 mg/dL Final    HDL 10/25/2022 69  40 - 75 mg/dL Final    LDL Cholesterol 10/25/2022 72.8  63.0 - 159.0 mg/dL Final    HDL/Cholesterol Ratio 10/25/2022 42.9  20.0 - 50.0 % Final    Total Cholesterol/HDL Ratio 10/25/2022 2.3  2.0 - 5.0 Final    Non-HDL Cholesterol 10/25/2022 92  mg/dL Final    PSA, Screen 10/25/2022 0.91  0.00 - 4.00 ng/mL Final    TSH 10/25/2022 1.700  0.400 - 4.000 uIU/mL Final       Past Medical History:   Diagnosis Date    Enlarged prostate     Hypertension     Kidney stone     Urinary tract infection      Past Surgical History:   Procedure Laterality Date    COLONOSCOPY      EXTRACORPOREAL SHOCK WAVE LITHOTRIPSY Left 5/29/2019    Procedure: LITHOTRIPSY,  ESWL;  Surgeon: Kobi Troncoso MD;  Location: [facility] OR;  Service: Urology;  Laterality: Left;    HIP SURGERY  1990    JOINT REPLACEMENT      PROSTATECTOMY  2017    TONSILLECTOMY       History reviewed. No pertinent family history.    The CVD Risk score (DAVID'Agostino, et al., 2008) failed to calculate for the following reasons:    The 2008 CVD risk score is only valid for ages 30 to 74     Marital Status:   Alcohol History:  reports current alcohol use.  Tobacco History:  reports that he has been smoking cigars. He has never used smokeless tobacco.  Drug History:  reports no history of drug use.    Health Maintenance Topics with due status: Not Due       Topic Last Completion Date    Lipid Panel 01/23/2023     Immunization History   Administered Date(s) Administered    Influenza - High Dose - PF (65 years and older) 09/24/2018    Zoster Recombinant 08/14/2018       Review of patient's allergies indicates:  No Known Allergies    Current Outpatient Medications:     amLODIPine (NORVASC) 5 MG tablet, TK 1 T PO D, Disp: , Rfl: 3    aspirin (ECOTRIN) 81 MG EC tablet, aspirin 81 mg tablet,delayed release  Take 1 tablet every day by oral route., Disp: , Rfl:     carvediloL (COREG) 3.125 MG tablet, TK 1 T PO D Strength: 3.125 mg, Disp: 180 tablet, Rfl: 2    cloNIDine (CATAPRES) 0.1 MG tablet, See Instructions, 1 tablet daily as needed if systolic blood pressure [id number], # 90 tab, 3 Refill(s), Maintenance, Pharmacy: [facility] Strength: 0.1 mg, Disp: 90 tablet, Rfl: 1    donepeziL (ARICEPT) 5 MG tablet, Take 1 tablet (5 mg total) by mouth every evening., Disp: 30 tablet, Rfl: 5    fish oil-omega-3 fatty acids 300-1,000 mg capsule, Take by mouth once daily., Disp: , Rfl:     glucosamine-chondroitin 500-400 mg tablet, Take 1 tablet by mouth 3 (three) times daily., Disp: , Rfl:     lisinopriL-hydrochlorothiazide (PRINZIDE,ZESTORETIC) 10-12.5 mg per tablet, Take 1 tablet by mouth once daily., Disp: 90  tablet, Rfl: 1    lovastatin (MEVACOR) 20 MG tablet, TK 1 T PO HS Strength: 20 mg, Disp: 90 tablet, Rfl: 2    meclizine (ANTIVERT) 25 mg tablet, Take 1 tablet (25 mg total) by mouth 3 (three) times daily as needed for Dizziness., Disp: 30 tablet, Rfl: 0    omega 3-dha-epa-fish oil (FISH OIL) 100-160-1,000 mg Cap,  1 cap, Oral, Daily, # 100 cap, 0 Refill(s), Disp: , Rfl:     potassium chloride (KLOR-CON) 10 MEQ TbSR, TK 2 TS PO BID Strength: 10 mEq, Disp: 360 tablet, Rfl: 1    potassium chloride (MICRO-K) 10 MEQ CpSR,  See Instructions, TAKE 2 TABLETS TWICE DAILY (DOSE CHANGE), # 360 tab, 0 Refill(s), Pharmacy: [facility], 182, cm, 04/12/21 9:23:00 CDT, Height/Length Measured, 84.7, kg, 04/12/21 9:23:00 CDT, Weight Dosing, Disp: , Rfl:     tamsulosin (FLOMAX) 0.4 mg Cap, Take 2 capsules (0.8 mg total) by mouth once daily., Disp: 180 capsule, Rfl: 3    traZODone (DESYREL) 50 MG tablet, Take 1 tablet (50 mg total) by mouth every evening., Disp: 30 tablet, Rfl: 5    Review of Systems   Constitutional:  Negative for appetite change, chills, fever and unexpected weight change.   HENT:  Negative for sore throat and trouble swallowing.    Eyes:  Negative for visual disturbance.   Respiratory:  Negative for cough, shortness of breath and wheezing.    Cardiovascular:  Negative for chest pain, palpitations and leg swelling.   Gastrointestinal:  Negative for abdominal pain, constipation and diarrhea.   Genitourinary:  Positive for frequency. Negative for dysuria and hematuria.   Musculoskeletal:  Negative for arthralgias, back pain and gait problem.   Skin:  Negative for rash.   Neurological:  Negative for dizziness, syncope and numbness.   Psychiatric/Behavioral:  Positive for confusion (Short-term memory loss, some improvement with addition of donepezil). Negative for agitation, dysphoric mood and sleep disturbance (improved with trazodone). The patient is not nervous/anxious.         Objective:     "  Vitals:    01/25/23 1114   BP: 122/64   Pulse: 78   SpO2: 99%   Weight: 81.6 kg (180 lb)   Height: 6' (1.829 m)     Physical Exam  Vitals reviewed.   Constitutional:       General: He is not in acute distress.     Appearance: Normal appearance. He is well-developed.      Comments: Thin well-dressed white male   HENT:      Head: Normocephalic and atraumatic.      Right Ear: Tympanic membrane and ear canal normal.      Left Ear: Tympanic membrane and ear canal normal.   Neck:      Vascular: No carotid bruit.   Cardiovascular:      Rate and Rhythm: Normal rate and regular rhythm.      Heart sounds: No murmur heard.  Pulmonary:      Effort: Pulmonary effort is normal. No respiratory distress.      Breath sounds: Normal breath sounds. No wheezing or rales.   Abdominal:      General: There is no distension.      Palpations: Abdomen is soft.      Tenderness: There is no abdominal tenderness.   Musculoskeletal:      Cervical back: Neck supple.      Right lower leg: No edema.      Left lower leg: No edema.   Lymphadenopathy:      Cervical: No cervical adenopathy.   Skin:     General: Skin is warm and dry.      Findings: No rash.   Neurological:      General: No focal deficit present.      Mental Status: He is alert and oriented to person, place, and time.      Gait: Gait normal.   Psychiatric:         Mood and Affect: Mood normal.       MINI-MENTAL STATE EXAM27    What is the (year), (season), (date), (day), (month)?3 points  Where are we (state), (parish), (town), (hospital), (floor)? 4 points  Name 3 objects: 1 second to say each, then ask the patient to repeat all three after you have said them.  Repeat initially until he/she learns all three. 3 points  Serial 7's. 1 point for each answer, stop after 5.  Alternatively, spell "world" backwards.  Must be in the correct sequence.  5 points  Show 2 common objects (pencil and watch).  Ask patient to name. 2 points  Ask the patient to repeat No ifs, ands or buts. 1 " "point  Follow a 3 stage command: "Take this paper in your right hand, fold it in half, and put it on the floor". 3 points  Read and obey: "Close your eyes". 1 poinr  Ask the patient to recall the three words you previously asked. 3 points  Give pt. paper and ask to write a sentence. 1 point  Show pt. drawing of intersecting pentagons. Ask pt. to draw. 1 point  What was the mini mental exam score? 27 /30  Level of consciousness: alert  Describe if abnormal:   Fund of knowledge: Normal  General appearance: Normal    Assessment:       1. MCI (mild cognitive impairment)    2. Primary hypertension    3. Mixed hyperlipidemia           Plan:       1. MCI (mild cognitive impairment)  Comments:  Patient/wife report improvement in donepezil, MMSE improved to 27/30.  Referral given to Dr. Lowry, neurology to discuss newly approved Alzheimer's medication  Orders:  -     Ambulatory referral/consult to Neurology; Future; Expected date: 02/01/2023    2. Primary hypertension  Comments:  BP well controlled    3. Mixed hyperlipidemia  Comments:  Reviewed recent labs with patient, well controlled      Follow up in about 4 months (around 5/25/2023) for HTN, lipids, MCI.          Counseled on age and gender appropriate medical preventative services, including cancer screenings, immunizations, overall nutritional health, need for a consistent exercise regimen and an overall push towards maintaining a vigorous and active lifestyle.      1/25/2023 Jennifer Conklin NP      "

## 2023-01-25 NOTE — TELEPHONE ENCOUNTER
Spoke to pts wife and let her know per yang verbatim. Pts wife stated the will stay with 5mg for now and see about It the next visit

## 2023-01-25 NOTE — TELEPHONE ENCOUNTER
Please let pt's wife know I wasn't sure they wanted to increase donepezil for now- we could either increase to 10mg or continue 5mg. Let her know he scored much better on the memory test today than at last visit

## 2023-01-25 NOTE — PATIENT INSTRUCTIONS
Jim Lowry MD- neurologist, call to schedule appointment  [id number] [address]  [place] [address]  Phone: [phone]

## 2023-01-25 NOTE — TELEPHONE ENCOUNTER
----- Message from Rekha Gomez sent at 1/25/2023 12:25 PM CST -----  Jennifer saw this patient today. His wife wanted to know if you were going to increase his memory medication. Santino's   # [phone] GH

## 2023-02-27 ENCOUNTER — TELEPHONE (OUTPATIENT)
Dept: NEUROLOGY | Facility: CLINIC | Age: 83
End: 2023-02-27
Payer: MEDICARE

## 2023-02-27 ENCOUNTER — LAB VISIT (OUTPATIENT)
Dept: LAB | Facility: HOSPITAL | Age: 83
End: 2023-02-27
Payer: MEDICARE

## 2023-02-27 ENCOUNTER — OFFICE VISIT (OUTPATIENT)
Dept: NEUROLOGY | Facility: CLINIC | Age: 83
End: 2023-02-27
Payer: MEDICARE

## 2023-02-27 VITALS — HEIGHT: 72 IN | WEIGHT: 180.56 LBS | BODY MASS INDEX: 24.46 KG/M2

## 2023-02-27 DIAGNOSIS — R41.3 MEMORY LOSS: ICD-10-CM

## 2023-02-27 DIAGNOSIS — R41.3 MEMORY LOSS: Primary | ICD-10-CM

## 2023-02-27 DIAGNOSIS — F51.01 PRIMARY INSOMNIA: ICD-10-CM

## 2023-02-27 PROBLEM — G31.84 MCI (MILD COGNITIVE IMPAIRMENT) WITH MEMORY LOSS: Status: RESOLVED | Noted: 2022-10-22 | Resolved: 2023-02-27

## 2023-02-27 LAB — AMMONIA PLAS-SCNC: 17 UMOL/L (ref 10–50)

## 2023-02-27 PROCEDURE — 99999 PR PBB SHADOW E&M-EST. PATIENT-LVL IV: ICD-10-PCS | Mod: PBBFAC,,, | Performed by: NURSE PRACTITIONER

## 2023-02-27 PROCEDURE — 82607 VITAMIN B-12: CPT | Performed by: NURSE PRACTITIONER

## 2023-02-27 PROCEDURE — 99204 PR OFFICE/OUTPT VISIT, NEW, LEVL IV, 45-59 MIN: ICD-10-PCS | Mod: S$GLB,,, | Performed by: NURSE PRACTITIONER

## 2023-02-27 PROCEDURE — 1101F PT FALLS ASSESS-DOCD LE1/YR: CPT | Mod: CPTII,S$GLB,, | Performed by: NURSE PRACTITIONER

## 2023-02-27 PROCEDURE — 84425 ASSAY OF VITAMIN B-1: CPT | Performed by: NURSE PRACTITIONER

## 2023-02-27 PROCEDURE — 1126F PR PAIN SEVERITY QUANTIFIED, NO PAIN PRESENT: ICD-10-PCS | Mod: CPTII,S$GLB,, | Performed by: NURSE PRACTITIONER

## 2023-02-27 PROCEDURE — 99204 OFFICE O/P NEW MOD 45 MIN: CPT | Mod: S$GLB,,, | Performed by: NURSE PRACTITIONER

## 2023-02-27 PROCEDURE — 3288F FALL RISK ASSESSMENT DOCD: CPT | Mod: CPTII,S$GLB,, | Performed by: NURSE PRACTITIONER

## 2023-02-27 PROCEDURE — 82140 ASSAY OF AMMONIA: CPT | Performed by: NURSE PRACTITIONER

## 2023-02-27 PROCEDURE — 82746 ASSAY OF FOLIC ACID SERUM: CPT | Performed by: NURSE PRACTITIONER

## 2023-02-27 PROCEDURE — 1160F PR REVIEW ALL MEDS BY PRESCRIBER/CLIN PHARMACIST DOCUMENTED: ICD-10-PCS | Mod: CPTII,S$GLB,, | Performed by: NURSE PRACTITIONER

## 2023-02-27 PROCEDURE — 3288F PR FALLS RISK ASSESSMENT DOCUMENTED: ICD-10-PCS | Mod: CPTII,S$GLB,, | Performed by: NURSE PRACTITIONER

## 2023-02-27 PROCEDURE — 83921 ORGANIC ACID SINGLE QUANT: CPT | Performed by: NURSE PRACTITIONER

## 2023-02-27 PROCEDURE — 99999 PR PBB SHADOW E&M-EST. PATIENT-LVL IV: CPT | Mod: PBBFAC,,, | Performed by: NURSE PRACTITIONER

## 2023-02-27 PROCEDURE — 1160F RVW MEDS BY RX/DR IN RCRD: CPT | Mod: CPTII,S$GLB,, | Performed by: NURSE PRACTITIONER

## 2023-02-27 PROCEDURE — 1159F MED LIST DOCD IN RCRD: CPT | Mod: CPTII,S$GLB,, | Performed by: NURSE PRACTITIONER

## 2023-02-27 PROCEDURE — 1126F AMNT PAIN NOTED NONE PRSNT: CPT | Mod: CPTII,S$GLB,, | Performed by: NURSE PRACTITIONER

## 2023-02-27 PROCEDURE — 1101F PR PT FALLS ASSESS DOC 0-1 FALLS W/OUT INJ PAST YR: ICD-10-PCS | Mod: CPTII,S$GLB,, | Performed by: NURSE PRACTITIONER

## 2023-02-27 PROCEDURE — 1159F PR MEDICATION LIST DOCUMENTED IN MEDICAL RECORD: ICD-10-PCS | Mod: CPTII,S$GLB,, | Performed by: NURSE PRACTITIONER

## 2023-02-27 PROCEDURE — 86592 SYPHILIS TEST NON-TREP QUAL: CPT | Performed by: NURSE PRACTITIONER

## 2023-02-27 RX ORDER — DONEPEZIL HYDROCHLORIDE 10 MG/1
10 TABLET, FILM COATED ORAL NIGHTLY
Qty: 30 TABLET | Refills: 11 | Status: SHIPPED | OUTPATIENT
Start: 2023-02-27 | End: 2023-05-15 | Stop reason: SDUPTHER

## 2023-02-27 NOTE — ASSESSMENT & PLAN NOTE
Patient is a 82 y/o male that presents for memory evaluation. Family and patent report difficulty with word finding and short term recall. Onset more so in the last 6-8 months.   There are no behavioral changes though he does get easily frustrated. No hallucinations, depression, recent falls. His sleep is much improved since starting Trazodone.   MMSE today 27/30; prev 17/30 in October by his PCP   - suspect MCI vs mild dementia  Obtain MRI brain scan and serologies for baseline  Discussed role vs expectation of cognitive enhancing mediations at length   - started on Aricept ~ 5 months ago and tolerating well   - increase Aricept to 10 mg QHS; S/E discussed  Referral placed to Neuro psych for further testing  Discussed ways to promote brain stimulation and healthy sleep routine.   There are no safety concerns though I did suggest that he start using a pill organizer for med management. He is no longer driving

## 2023-02-27 NOTE — PROGRESS NOTES
NEUROLOGY  Outpatient CONSULT    [facility]  [address] [facility], [place] [address]  [phone] (office) / [phone] (fax)    Patient Name:  Nino Mackay  :  1940  MR #:  [id number]  Acct #:  [id number]    Date of Neurology Consult: 2023  Name of Provider: CARON Sinha    Other Physicians:  Yonas Alaniz MD (Primary Care Physician); Jennifer Conklin NP (Referring)      Chief Complaint: Memory Loss      History of Present Illness (HPI):  Nino Mackay is a left handed 83 y.o. male with a PMHX of HTN, Kidney stones, UTI  Patient is here today for memory loss. He is accompanied by his spouse and daughter who help supply information. He states he does forget certain words & names at times.     Patient's highest level of education completed was college. He was a musician and artist. The onset of memory issues likely began in the last 6-8 months. He struggles more with short term memory loss. Spouse states he has increased frustration and is short tempered at times. He denies depression and hallucinations. He is now taking Trazodone which does offer aid. Previously he was having nightmares, even as a youth. Trazodone does help with this. He does write things down to assist with memory.  Spouse is managing the finances and has always done so. He is managing is own medications. Every so often he may forget if he has taken a pill or not. There are no issues with hygiene and able to perform ADLs without assistance. He does have word finding difficulty. He also does have urinary frequency. He denies recent falls. He is no longer driving due to ongoing vertigo. He likes to read and plays music.  He was placed on Aricept about 5-6 months ago by his PCP for memory loss and has tolerated it well.           Past Medical, Surgical, Family & Social History:   Past Medical History:   Diagnosis Date    Enlarged prostate     Hypertension     Kidney stone      Urinary tract infection      Past Surgical History:   Procedure Laterality Date    COLONOSCOPY      EXTRACORPOREAL SHOCK WAVE LITHOTRIPSY Left 5/29/2019    Procedure: LITHOTRIPSY, ESWL;  Surgeon: Kobi Troncoso MD;  Location: [facility] OR;  Service: Urology;  Laterality: Left;    HIP SURGERY  1990    JOINT REPLACEMENT      PROSTATECTOMY  2017    TONSILLECTOMY       No family history on file.  Alcohol use:  reports current alcohol use.   (Of note, 0.6 oz = 1 beer or 6 oz = 10 beers).  Tobacco use:  reports that he has been smoking cigars. He has never used smokeless tobacco.  Street drug use:  reports no history of drug use.  Allergies: Patient has no known allergies..    Home Medications:     Current Outpatient Medications:     amLODIPine (NORVASC) 5 MG tablet, TK 1 T PO D, Disp: , Rfl: 3    carvediloL (COREG) 3.125 MG tablet, TK 1 T PO D Strength: 3.125 mg, Disp: 180 tablet, Rfl: 2    cloNIDine (CATAPRES) 0.1 MG tablet, See Instructions, 1 tablet daily as needed if systolic blood pressure [id number], # 90 tab, 3 Refill(s), Maintenance, Pharmacy: [facility] [id number] Strength: 0.1 mg, Disp: 90 tablet, Rfl: 1    donepeziL (ARICEPT) 5 MG tablet, Take 1 tablet (5 mg total) by mouth every evening., Disp: 90 tablet, Rfl: 3    fish oil-omega-3 fatty acids 300-1,000 mg capsule, Take by mouth once daily., Disp: , Rfl:     glucosamine-chondroitin 500-400 mg tablet, Take 1 tablet by mouth 3 (three) times daily., Disp: , Rfl:     lisinopriL-hydrochlorothiazide (PRINZIDE,ZESTORETIC) 10-12.5 mg per tablet, Take 1 tablet by mouth once daily., Disp: 90 tablet, Rfl: 1    lovastatin (MEVACOR) 20 MG tablet, TK 1 T PO HS Strength: 20 mg, Disp: 90 tablet, Rfl: 2    meclizine (ANTIVERT) 25 mg tablet, Take 1 tablet (25 mg total) by mouth 3 (three) times daily as needed for Dizziness., Disp: 30 tablet, Rfl: 0    potassium chloride (KLOR-CON) 10 MEQ TbSR, TK 2 TS PO BID Strength: 10 mEq, Disp: 360 tablet, Rfl: 1    potassium chloride  (MICRO-K) 10 MEQ CpSR,  See Instructions, TAKE 2 TABLETS TWICE DAILY (DOSE CHANGE), # 360 tab, 0 Refill(s), Pharmacy: [facility], 182, cm, 04/12/21 9:23:00 CDT, Height/Length Measured, 84.7, kg, 04/12/21 9:23:00 CDT, Weight Dosing, Disp: , Rfl:     traZODone (DESYREL) 50 MG tablet, Take 1 tablet (50 mg total) by mouth every evening., Disp: 90 tablet, Rfl: 3    aspirin (ECOTRIN) 81 MG EC tablet, aspirin 81 mg tablet,delayed release  Take 1 tablet every day by oral route., Disp: , Rfl:     omega 3-dha-epa-fish oil (FISH OIL) 100-160-1,000 mg Cap,  1 cap, Oral, Daily, # 100 cap, 0 Refill(s), Disp: , Rfl:     tamsulosin (FLOMAX) 0.4 mg Cap, Take 2 capsules (0.8 mg total) by mouth once daily. (Patient not taking: Reported on 2/27/2023), Disp: 180 capsule, Rfl: 3    Physical Examination:  Ht 6' (1.829 m)   Wt 81.9 kg (180 lb 8.9 oz)   BMI 24.49 kg/m²       GENERAL:  General appearance: Well, non-toxic appearing.  No apparent distress.  Neck: supple.      MENTAL STATUS:  Alertness, attention span & concentration: normal.  Language: normal.  Orientation to self, place & time:  normal.  Memory, recent & remote: normal.  Fund of knowledge: normal.  MMSE:27/30 27/30 (1/2023 by PCP)  17/30 (10/2022 by PCP)    SPEECH:  Clear and fluent.  Follows complex commands.      CRANIAL NERVES:  Cranial Nerves II-XII were examined.  II - Visual fields: normal.  III, IV, VI: PERRL, EOMI, No ptosis, No nystagmus.  V - Facial sensation: normal.  VII - Face symmetry & mobility: normal.  VIII - Hearing: normal  IX, X - Palate: mobile & midline.  XI - Shoulder shrug: normal.  XII - Tongue protrusion: normal.        GROSS MOTOR:  Gait & station: mildly stooped; good arm swing and step height  Tone: normal.  Abnormal movements: none.  Finger-nose: normal.  Rapid alternating movements: normal.  Pronator drift: normal      MUSCLE STRENGTH:   Hand grasp:   - right:5/5   - left:5/5    RIGHT    LEFT   5 Deltoids 5   5 Biceps 5   5  Triceps 5   5 Forearm.Pr. 5        5 Iliopsoas flex    5   5 Hip Abduct 5   5 Hip Adduct 5   5 Quads 5   5 Hams 5   5 Dorsiflex 5   5 Plantar Flex 5              REFLEXES:    RIGHT Reflex   LEFT   2+ Biceps 2+   2+ Brachiorad. 2+        2+ Patellar 2+     SENSORY:  Light touch: Normal throughout.         Diagnostic Data Reviewed:       Component      Latest Ref Rng & Units 1/23/2023 10/25/2022   Sodium      136 - 145 mmol/L 141    Potassium      3.5 - 5.1 mmol/L 4.0    Chloride      95 - 110 mmol/L 104    CO2      23 - 29 mmol/L 27    Glucose      70 - 110 mg/dL 109    BUN      8 - 23 mg/dL 15    Creatinine      0.5 - 1.4 mg/dL 1.2    Calcium      8.7 - 10.5 mg/dL 9.4    PROTEIN TOTAL      6.0 - 8.4 g/dL 7.1    Albumin      3.5 - 5.2 g/dL 4.1    BILIRUBIN TOTAL      0.1 - 1.0 mg/dL 0.9    Alkaline Phosphatase      55 - 135 U/L 56    AST      10 - 40 U/L 21    ALT      10 - 44 U/L 21    Anion Gap      8 - 16 mmol/L 10    eGFR      >60 mL/min/1.73 m:2 >60.0    Cholesterol      120 - 199 mg/dL 169    Triglycerides      30 - 150 mg/dL 113    HDL      40 - 75 mg/dL 78 (H)    LDL Cholesterol External      63.0 - 159.0 mg/dL 68.4    HDL/Cholesterol Ratio      20.0 - 50.0 % 46.2    Total Cholesterol/HDL Ratio      2.0 - 5.0 2.2    Non-HDL Cholesterol      mg/dL 91    TSH      0.400 - 4.000 uIU/mL  1.700     TSH   Date Value Ref Range Status   10/25/2022 1.700 0.400 - 4.000 uIU/mL Final              Assessment and Plan:  Nino Mackay is a 83 y.o. male.    Problem List Items Addressed This Visit          Neuro    Memory loss - Primary    Current Assessment & Plan     Patient is a 82 y/o male that presents for memory evaluation. Family and patent report difficulty with word finding and short term recall. Onset more so in the last 6-8 months.   There are no behavioral changes though he does get easily frustrated. No hallucinations, depression, recent falls. His sleep is much improved since starting Trazodone.   MMSE  today 27/30; prev 17/30 in October by his PCP   - suspect MCI vs mild dementia  Obtain MRI brain scan and serologies for baseline  Discussed role vs expectation of cognitive enhancing mediations at length   - started on Aricept ~ 5 months ago and tolerating well   - increase Aricept to 10 mg QHS; S/E discussed  Referral placed to Neuro psych for further testing  Discussed ways to promote brain stimulation and healthy sleep routine.   There are no safety concerns though I did suggest that he start using a pill organizer for med management. He is no longer driving                 Other    Primary insomnia    Overview     Improvement with trazodone   - continue                             Important to note, also  has a past medical history of Enlarged prostate, Hypertension, Kidney stone, and Urinary tract infection.            The patient will return to clinic in 6 months.        All questions were answered and patient is comfortable with the plan.       Thank you very much for the opportunity to assist in this patient's care.    If you have any questions or concerns, please do not hesitate to contact me at any time.    Sincerely,     CARON Sinha  [facility] [place]         I spent a total of 56 minutes on the day of the visit.This includes face to face time and non-face to face time preparing to see the patient (eg, review of tests), Obtaining and/or reviewing separately obtained history, Documenting clinical information in the electronic or other health record, Independently interpreting resultsand communicating results to the patient/family/caregiver, or Care coordination.

## 2023-02-27 NOTE — PATIENT INSTRUCTIONS
To prevent further memory loss, some of the best preventative measures are following a healthy diet, getting regular exercise, and ensuring good sleep habits.  Approximately 30 to 45 minutes of brisk physical activity (brisk walking, swimming, stationary bicycle, etc.) 5 days a week has been shown to improve function in vascular dementias, and can lower the risk of stroke and slow progression of memory loss.    A Mediterranean style diet, or the DASH diet with lots of fresh fruits and vegetables, whole grains, more fish and chicken, less red meat, less dairy, less processed foods is also beneficial. For more information see:     https://www.rush.[facility] [place]/news/diet-may-help-prevent-alzheimers     Minimize or eliminate the use of alcohol, and discuss any prescription medications you might be taking with your doctor to avoid medications which can cause sedation or worsen cognitive function.    Establish a regular, consistent sleep pattern and practice good sleep hygiene.  Avoid screen time (computer, TV, smartphones or tablets) or heavy meals for at least an hour before bedtime, and avoid caffeine or stimulants after 2 PM. Exercise earlier in the day or mornings and keep your sleeping environment comfortable.     Socializing with friends and family and staying both mentally and physically active is also very important. Continue with hobbies or activities that are engaging and practice activities that are mentally stimulating (word puzzles, Sudoku, etc) and stay active in the community.    Some supplements which may be of potential benefit include:  - tumeric (curcumin) supplement  - omega-3 fatty acids with sufficient amounts of EPA and DHA, 1000 to 2000 mg a day   - Vitamin D3 supplement 2000 units (IU) daily   - Green tea and green tea extracts may also help (caffeine free)   - supplements containing Phosphatidylserine (PS) and phosphatidylcholine (PC)     There may be some benefit to dietary supplements, however there is  no definitive evidence to support the prevention of cognitive impairment or dementia.     Please look into the following websites, to help you find resources including day programs, caregivers and caregiver support, legal questions, support groups, etc.    [facility] ([facility])  [facility] - [address]  [facility] - [address]    Group meetings facilitated by Darien Sagastume MA, BOOKER [phone]    ADDRESS  Mailing Address:  [address]  [place] [address] Street Address:  [address] [place] [address]   Web Address:  [email or web address]     Services offered at this location:  Chore, Congregate Meals, Home Delivered Meals, Homemaker, Information and Assistance, Legal, Material Aid, Medical Alert, Medication Management, NFCSP Information and Assistance, NFCSP In-Home Respite, NFCSP Material Aid, NGCSP Personal Care , NFCSP Public Education, NFCSP Sitter Service, NFCSP Support Groups, Nutrition Counseling, Nutrition Education, Outreach, Recreation, Transportation, Utility Assistance, Wellness, Area Agency on Aging, [place] on Aging      Website for the Alzheimers Association:  http://www.alz.org/   Excellent resources for finding community resources  Action plan navigator and online tools  Call [phone] for 24/7 helpline    [facility] Office of Aging and Adult Services:  Http://www.ldh.[place].gov/index.cfm/subhome/12/n/7    Peace With Dementia: http://carepartAudienceScience.com/    Website for [facility] Agency on Ageing: http://goea.[place].gov/index.cfm?md=jose l&tmp=category&catID=38&nid=24&ssid=0&startIndex=1       PATIENT/FAMILY RESOURCES:  1. Alzheimer's Association       http://www.alz.org  2. Alzheimer's Foundation of [place]     http://www.alzfdn.org  3. The Alzheimers Disease Education and Referral Center  https://www.filomena.nih.gov/alzheimers  4. Lewy Body Dementia Association      http://www.lbda.org  5. National [place]  of Mental Health     http://www.nimh.nih.gov  6. National [place] on Mental Illness     http://www.marjorie.org  7. Mental Health [place]       http://www.mentalhealthamerica.net  8. Mental Health.gov        http://www.mentalhealth.gov  9. National [place] for Behavioral Health     http://www.thenationalcouncil.org  10. Substance Abuse and Mental Health Services Administration  http://www.samhsa.gov    11. Licensed local counselors, social workers, psychiatrists, psychologists - one starting point is the Psychology Today website, therapist finder

## 2023-02-28 LAB
FOLATE SERPL-MCNC: 16.3 NG/ML (ref 4–24)
RPR SER QL: NORMAL
VIT B12 SERPL-MCNC: 319 PG/ML (ref 210–950)

## 2023-03-03 LAB — VIT B1 BLD-MCNC: 83 UG/L (ref 38–122)

## 2023-03-07 ENCOUNTER — TELEPHONE (OUTPATIENT)
Dept: NEUROLOGY | Facility: CLINIC | Age: 83
End: 2023-03-07
Payer: MEDICARE

## 2023-03-07 LAB — METHYLMALONATE SERPL-SCNC: 0.39 UMOL/L

## 2023-03-07 NOTE — TELEPHONE ENCOUNTER
----- Message from MIRELLA Rincon sent at 3/7/2023  8:23 AM CST -----  Please inform the patient that his labs look OK but his B12 is on the low side. I recommend he start taking B12 supplements   - 1 tab = 1000 mcg daily  This will aid with brain health and energy

## 2023-03-10 ENCOUNTER — HOSPITAL ENCOUNTER (OUTPATIENT)
Dept: RADIOLOGY | Facility: HOSPITAL | Age: 83
Discharge: HOME | End: 2023-03-10
Attending: NURSE PRACTITIONER
Payer: MEDICARE

## 2023-03-10 DIAGNOSIS — R41.3 MEMORY LOSS: ICD-10-CM

## 2023-03-10 PROCEDURE — 70551 MRI BRAIN STEM W/O DYE: CPT | Mod: TC,PO

## 2023-04-19 ENCOUNTER — OFFICE VISIT (OUTPATIENT)
Dept: FAMILY MEDICINE | Facility: CLINIC | Age: 83
End: 2023-04-19
Payer: MEDICARE

## 2023-04-19 VITALS
HEIGHT: 70 IN | WEIGHT: 173 LBS | SYSTOLIC BLOOD PRESSURE: 108 MMHG | HEART RATE: 60 BPM | BODY MASS INDEX: 24.77 KG/M2 | DIASTOLIC BLOOD PRESSURE: 60 MMHG

## 2023-04-19 DIAGNOSIS — I49.9 CARDIAC ARRHYTHMIA, UNSPECIFIED CARDIAC ARRHYTHMIA TYPE: ICD-10-CM

## 2023-04-19 DIAGNOSIS — I71.40 ABDOMINAL AORTIC ANEURYSM (AAA) WITHOUT RUPTURE, UNSPECIFIED PART: ICD-10-CM

## 2023-04-19 DIAGNOSIS — G30.1 MILD LATE ONSET ALZHEIMER'S DEMENTIA WITHOUT BEHAVIORAL DISTURBANCE, PSYCHOTIC DISTURBANCE, MOOD DISTURBANCE, OR ANXIETY: Primary | ICD-10-CM

## 2023-04-19 DIAGNOSIS — R41.3 MEMORY LOSS: ICD-10-CM

## 2023-04-19 DIAGNOSIS — N40.0 BENIGN PROSTATIC HYPERPLASIA WITHOUT LOWER URINARY TRACT SYMPTOMS: ICD-10-CM

## 2023-04-19 DIAGNOSIS — F51.01 PRIMARY INSOMNIA: ICD-10-CM

## 2023-04-19 DIAGNOSIS — N20.0 KIDNEY STONE ON LEFT SIDE: ICD-10-CM

## 2023-04-19 DIAGNOSIS — I10 HYPERTENSION, UNSPECIFIED TYPE: ICD-10-CM

## 2023-04-19 DIAGNOSIS — I49.3 PVC'S (PREMATURE VENTRICULAR CONTRACTIONS): ICD-10-CM

## 2023-04-19 DIAGNOSIS — F02.A0 MILD LATE ONSET ALZHEIMER'S DEMENTIA WITHOUT BEHAVIORAL DISTURBANCE, PSYCHOTIC DISTURBANCE, MOOD DISTURBANCE, OR ANXIETY: Primary | ICD-10-CM

## 2023-04-19 DIAGNOSIS — E78.2 MIXED HYPERLIPIDEMIA: ICD-10-CM

## 2023-04-19 PROCEDURE — 1101F PT FALLS ASSESS-DOCD LE1/YR: CPT | Mod: CPTII,S$GLB,, | Performed by: FAMILY MEDICINE

## 2023-04-19 PROCEDURE — 99214 PR OFFICE/OUTPT VISIT, EST, LEVL IV, 30-39 MIN: ICD-10-PCS | Mod: 25,S$GLB,, | Performed by: FAMILY MEDICINE

## 2023-04-19 PROCEDURE — 1101F PR PT FALLS ASSESS DOC 0-1 FALLS W/OUT INJ PAST YR: ICD-10-PCS | Mod: CPTII,S$GLB,, | Performed by: FAMILY MEDICINE

## 2023-04-19 PROCEDURE — 3078F PR MOST RECENT DIASTOLIC BLOOD PRESSURE < 80 MM HG: ICD-10-PCS | Mod: CPTII,S$GLB,, | Performed by: FAMILY MEDICINE

## 2023-04-19 PROCEDURE — 93000 ELECTROCARDIOGRAM COMPLETE: CPT | Mod: S$GLB,,, | Performed by: FAMILY MEDICINE

## 2023-04-19 PROCEDURE — 3288F PR FALLS RISK ASSESSMENT DOCUMENTED: ICD-10-PCS | Mod: CPTII,S$GLB,, | Performed by: FAMILY MEDICINE

## 2023-04-19 PROCEDURE — 99214 OFFICE O/P EST MOD 30 MIN: CPT | Mod: 25,S$GLB,, | Performed by: FAMILY MEDICINE

## 2023-04-19 PROCEDURE — 3074F PR MOST RECENT SYSTOLIC BLOOD PRESSURE < 130 MM HG: ICD-10-PCS | Mod: CPTII,S$GLB,, | Performed by: FAMILY MEDICINE

## 2023-04-19 PROCEDURE — 93000 POCT EKG 12-LEAD: ICD-10-PCS | Mod: S$GLB,,, | Performed by: FAMILY MEDICINE

## 2023-04-19 PROCEDURE — 3078F DIAST BP <80 MM HG: CPT | Mod: CPTII,S$GLB,, | Performed by: FAMILY MEDICINE

## 2023-04-19 PROCEDURE — 1159F PR MEDICATION LIST DOCUMENTED IN MEDICAL RECORD: ICD-10-PCS | Mod: CPTII,S$GLB,, | Performed by: FAMILY MEDICINE

## 2023-04-19 PROCEDURE — 1159F MED LIST DOCD IN RCRD: CPT | Mod: CPTII,S$GLB,, | Performed by: FAMILY MEDICINE

## 2023-04-19 PROCEDURE — 3074F SYST BP LT 130 MM HG: CPT | Mod: CPTII,S$GLB,, | Performed by: FAMILY MEDICINE

## 2023-04-19 PROCEDURE — 3288F FALL RISK ASSESSMENT DOCD: CPT | Mod: CPTII,S$GLB,, | Performed by: FAMILY MEDICINE

## 2023-04-19 RX ORDER — LISINOPRIL AND HYDROCHLOROTHIAZIDE 10; 12.5 MG/1; MG/1
1 TABLET ORAL DAILY
Qty: 90 TABLET | Refills: 3 | Status: SHIPPED | OUTPATIENT
Start: 2023-04-19

## 2023-04-20 NOTE — PROGRESS NOTES
"  SUBJECTIVE:    Patient ID: Nino Mackay is a 83 y.o. male.    Chief Complaint: Follow-up (No bottles, need refills, cramps, memory problem, MRI brain- results, 3/10/23, abc )    83-year-old male with memory loss and dementia.  He still quite active and walks in his neighborhood.  He has a new dog "Jomar" 80 walks vigorously every day.  Patient also reads and pains and tries to keep his music for rash.  He used to be a professional banjo and .    He scored 17/30 on his mini-mental status exam.  He is on donepezil 10 mg at this time for memory loss.  He sees a neuro psychiatrist, MRI of the brain showed no tumors or strokes, just mild atrophy.    Insomnia-sleeps much better with trazodone, he does get occasional leg cramps at night.    He smokes an occasional cigar, drinks 2 glasses of wine per night.      Lab Visit on 02/27/2023   Component Date Value Ref Range Status    RPR 02/27/2023 Non-reactive  Non-reactive Final    Thiamine 02/27/2023 83  38 - 122 ug/L Final    Vitamin B-12 02/27/2023 319  210 - 950 pg/mL Final    Folate 02/27/2023 16.3  4.0 - 24.0 ng/mL Final    Ammonia 02/27/2023 17  10 - 50 umol/L Final    Methlymalonic Acid 02/27/2023 0.39  <0.40 umol/L Final   Lab Visit on 01/23/2023   Component Date Value Ref Range Status    Sodium 01/23/2023 141  136 - 145 mmol/L Final    Potassium 01/23/2023 4.0  3.5 - 5.1 mmol/L Final    Chloride 01/23/2023 104  95 - 110 mmol/L Final    CO2 01/23/2023 27  23 - 29 mmol/L Final    Glucose 01/23/2023 109  70 - 110 mg/dL Final    BUN 01/23/2023 15  8 - 23 mg/dL Final    Creatinine 01/23/2023 1.2  0.5 - 1.4 mg/dL Final    Calcium 01/23/2023 9.4  8.7 - 10.5 mg/dL Final    Total Protein 01/23/2023 7.1  6.0 - 8.4 g/dL Final    Albumin 01/23/2023 4.1  3.5 - 5.2 g/dL Final    Total Bilirubin 01/23/2023 0.9  0.1 - 1.0 mg/dL Final    Alkaline Phosphatase 01/23/2023 56  55 - 135 U/L Final    AST 01/23/2023 21  10 - 40 U/L Final    ALT 01/23/2023 21  10 - 44 " U/L Final    Anion Gap 01/23/2023 10  8 - 16 mmol/L Final    eGFR 01/23/2023 >60.0  >60 mL/min/1.73 m^2 Final    Cholesterol 01/23/2023 169  120 - 199 mg/dL Final    Triglycerides 01/23/2023 113  30 - 150 mg/dL Final    HDL 01/23/2023 78 (H)  40 - 75 mg/dL Final    LDL Cholesterol 01/23/2023 68.4  63.0 - 159.0 mg/dL Final    HDL/Cholesterol Ratio 01/23/2023 46.2  20.0 - 50.0 % Final    Total Cholesterol/HDL Ratio 01/23/2023 2.2  2.0 - 5.0 Final    Non-HDL Cholesterol 01/23/2023 91  mg/dL Final   Lab Visit on 10/25/2022   Component Date Value Ref Range Status    Specimen UA 10/25/2022 Urine, Clean Catch   Final    Color, UA 10/25/2022 Yellow  Yellow, Straw, Cleopatra Final    Appearance, UA 10/25/2022 Clear  Clear Final    pH, UA 10/25/2022 8.0  5.0 - 8.0 Final    Specific Gravity, UA 10/25/2022 1.015  1.005 - 1.030 Final    Protein, UA 10/25/2022 Negative  Negative Final    Glucose, UA 10/25/2022 Negative  Negative Final    Ketones, UA 10/25/2022 Negative  Negative Final    Bilirubin (UA) 10/25/2022 Negative  Negative Final    Occult Blood UA 10/25/2022 Negative  Negative Final    Nitrite, UA 10/25/2022 Negative  Negative Final    Urobilinogen, UA 10/25/2022 Negative  Negative EU/dL Final    Leukocytes, UA 10/25/2022 Negative  Negative Final   Lab Visit on 10/25/2022   Component Date Value Ref Range Status    WBC 10/25/2022 4.62  3.90 - 12.70 K/uL Final    RBC 10/25/2022 4.34 (L)  4.60 - 6.20 M/uL Final    Hemoglobin 10/25/2022 14.7  14.0 - 18.0 g/dL Final    Hematocrit 10/25/2022 42.3  40.0 - 54.0 % Final    MCV 10/25/2022 98  82 - 98 fL Final    MCH 10/25/2022 33.9 (H)  27.0 - 31.0 pg Final    MCHC 10/25/2022 34.8  32.0 - 36.0 g/dL Final    RDW 10/25/2022 12.7  11.5 - 14.5 % Final    Platelets 10/25/2022 211  150 - 450 K/uL Final    MPV 10/25/2022 9.4  9.2 - 12.9 fL Final    Immature Granulocytes 10/25/2022 0.0  0.0 - 0.5 % Final    Gran # (ANC) 10/25/2022 2.3  1.8 - 7.7 K/uL Final    Immature Grans (Abs) 10/25/2022  0.00  0.00 - 0.04 K/uL Final    Lymph # 10/25/2022 1.6  1.0 - 4.8 K/uL Final    Mono # 10/25/2022 0.5  0.3 - 1.0 K/uL Final    Eos # 10/25/2022 0.2  0.0 - 0.5 K/uL Final    Baso # 10/25/2022 0.04  0.00 - 0.20 K/uL Final    nRBC 10/25/2022 0  0 /100 WBC Final    Gran % 10/25/2022 49.7  38.0 - 73.0 % Final    Lymph % 10/25/2022 34.0  18.0 - 48.0 % Final    Mono % 10/25/2022 11.3  4.0 - 15.0 % Final    Eosinophil % 10/25/2022 4.1  0.0 - 8.0 % Final    Basophil % 10/25/2022 0.9  0.0 - 1.9 % Final    Differential Method 10/25/2022 Automated   Final    Sodium 10/25/2022 141  136 - 145 mmol/L Final    Potassium 10/25/2022 3.8  3.5 - 5.1 mmol/L Final    Chloride 10/25/2022 105  95 - 110 mmol/L Final    CO2 10/25/2022 25  23 - 29 mmol/L Final    Glucose 10/25/2022 96  70 - 110 mg/dL Final    BUN 10/25/2022 14  8 - 23 mg/dL Final    Creatinine 10/25/2022 1.0  0.5 - 1.4 mg/dL Final    Calcium 10/25/2022 9.0  8.7 - 10.5 mg/dL Final    Total Protein 10/25/2022 6.7  6.0 - 8.4 g/dL Final    Albumin 10/25/2022 3.8  3.5 - 5.2 g/dL Final    Total Bilirubin 10/25/2022 1.1 (H)  0.1 - 1.0 mg/dL Final    Alkaline Phosphatase 10/25/2022 55  55 - 135 U/L Final    AST 10/25/2022 21  10 - 40 U/L Final    ALT 10/25/2022 17  10 - 44 U/L Final    Anion Gap 10/25/2022 11  8 - 16 mmol/L Final    eGFR 10/25/2022 >60.0  >60 mL/min/1.73 m^2 Final    Cholesterol 10/25/2022 161  120 - 199 mg/dL Final    Triglycerides 10/25/2022 96  30 - 150 mg/dL Final    HDL 10/25/2022 69  40 - 75 mg/dL Final    LDL Cholesterol 10/25/2022 72.8  63.0 - 159.0 mg/dL Final    HDL/Cholesterol Ratio 10/25/2022 42.9  20.0 - 50.0 % Final    Total Cholesterol/HDL Ratio 10/25/2022 2.3  2.0 - 5.0 Final    Non-HDL Cholesterol 10/25/2022 92  mg/dL Final    PSA, Screen 10/25/2022 0.91  0.00 - 4.00 ng/mL Final    TSH 10/25/2022 1.700  0.400 - 4.000 uIU/mL Final       Past Medical History:   Diagnosis Date    Enlarged prostate     Hypertension     Kidney stone     Urinary tract  infection      Social History     Socioeconomic History    Marital status:    Tobacco Use    Smoking status: Light Smoker     Types: Cigars    Smokeless tobacco: Never    Tobacco comments:     2 cigars/day   Substance and Sexual Activity    Alcohol use: Yes     Comment: 2 glasses of wine nightly    Drug use: Never     Past Surgical History:   Procedure Laterality Date    COLONOSCOPY      EXTRACORPOREAL SHOCK WAVE LITHOTRIPSY Left 5/29/2019    Procedure: LITHOTRIPSY, ESWL;  Surgeon: Kobi Troncoso MD;  Location: [facility] OR;  Service: Urology;  Laterality: Left;    HIP SURGERY  1990    JOINT REPLACEMENT      PROSTATECTOMY  2017    TONSILLECTOMY       No family history on file.    Review of patient's allergies indicates:  No Known Allergies    Current Outpatient Medications:     amLODIPine (NORVASC) 5 MG tablet, TK 1 T PO D, Disp: , Rfl: 3    aspirin (ECOTRIN) 81 MG EC tablet, aspirin 81 mg tablet,delayed release  Take 1 tablet every day by oral route., Disp: , Rfl:     carvediloL (COREG) 3.125 MG tablet, TK 1 T PO D Strength: 3.125 mg, Disp: 180 tablet, Rfl: 2    cloNIDine (CATAPRES) 0.1 MG tablet, See Instructions, 1 tablet daily as needed if systolic blood pressure [id number], # 90 tab, 3 Refill(s), Maintenance, Pharmacy: [facility] Strength: 0.1 mg, Disp: 90 tablet, Rfl: 1    donepeziL (ARICEPT) 10 MG tablet, Take 1 tablet (10 mg total) by mouth every evening., Disp: 30 tablet, Rfl: 11    fish oil-omega-3 fatty acids 300-1,000 mg capsule, Take by mouth once daily., Disp: , Rfl:     glucosamine-chondroitin 500-400 mg tablet, Take 1 tablet by mouth 3 (three) times daily., Disp: , Rfl:     lovastatin (MEVACOR) 20 MG tablet, TK 1 T PO HS Strength: 20 mg, Disp: 90 tablet, Rfl: 2    omega 3-dha-epa-fish oil (FISH OIL) 100-160-1,000 mg Cap,  1 cap, Oral, Daily, # 100 cap, 0 Refill(s), Disp: , Rfl:     potassium chloride (KLOR-CON) 10 MEQ TbSR, TK 2 TS PO BID Strength: 10 mEq, Disp: 360 tablet, Rfl:  "1    potassium chloride (MICRO-K) 10 MEQ CpSR,  See Instructions, TAKE 2 TABLETS TWICE DAILY (DOSE CHANGE), # 360 tab, 0 Refill(s), Pharmacy: [facility], 182, cm, 04/12/21 9:23:00 CDT, Height/Length Measured, 84.7, kg, 04/12/21 9:23:00 CDT, Weight Dosing, Disp: , Rfl:     tamsulosin (FLOMAX) 0.4 mg Cap, Take 2 capsules (0.8 mg total) by mouth once daily., Disp: 180 capsule, Rfl: 3    traZODone (DESYREL) 50 MG tablet, Take 1 tablet (50 mg total) by mouth every evening., Disp: 90 tablet, Rfl: 3    lisinopriL-hydrochlorothiazide (PRINZIDE,ZESTORETIC) 10-12.5 mg per tablet, Take 1 tablet by mouth once daily., Disp: 90 tablet, Rfl: 3    meclizine (ANTIVERT) 25 mg tablet, Take 1 tablet (25 mg total) by mouth 3 (three) times daily as needed for Dizziness., Disp: 30 tablet, Rfl: 0    Review of Systems   Constitutional:  Negative for appetite change, chills, fatigue, fever and unexpected weight change.   HENT:  Negative for ear pain and trouble swallowing.    Eyes:  Negative for pain, discharge and visual disturbance.   Respiratory:  Negative for apnea, cough, shortness of breath and wheezing.    Cardiovascular:  Negative for chest pain and leg swelling.   Gastrointestinal:  Negative for abdominal pain, blood in stool, constipation, diarrhea, nausea, vomiting and reflux.   Endocrine: Negative for cold intolerance, heat intolerance and polydipsia.   Genitourinary:  Negative for bladder incontinence, dysuria, erectile dysfunction, frequency, hematuria, testicular pain and urgency.   Musculoskeletal:  Negative for gait problem, joint swelling and myalgias.   Neurological:  Negative for dizziness, seizures and numbness.   Psychiatric/Behavioral:  Negative for agitation, behavioral problems and hallucinations. The patient is not nervous/anxious.          Objective:      Vitals:    04/19/23 1023   BP: 108/60   Pulse: 60   Weight: 78.5 kg (173 lb)   Height: 5' 10" (1.778 m)     Physical Exam  Vitals and nursing " note reviewed.   Constitutional:       General: He is not in acute distress.     Appearance: Normal appearance. He is well-developed. He is not toxic-appearing.   HENT:      Head: Normocephalic and atraumatic.      Right Ear: Tympanic membrane and external ear normal.      Left Ear: Tympanic membrane and external ear normal.      Nose: Nose normal.      Mouth/Throat:      Pharynx: Oropharynx is clear.   Eyes:      Pupils: Pupils are equal, round, and reactive to light.   Neck:      Thyroid: No thyromegaly.      Vascular: No carotid bruit.   Cardiovascular:      Rate and Rhythm: Normal rate. Rhythm irregular.      Heart sounds: Normal heart sounds. No murmur heard.  Pulmonary:      Effort: Pulmonary effort is normal.      Breath sounds: Normal breath sounds. No wheezing or rales.   Abdominal:      General: Bowel sounds are normal. There is no distension.      Palpations: Abdomen is soft.      Tenderness: There is no abdominal tenderness.   Musculoskeletal:         General: No tenderness or deformity. Normal range of motion.      Cervical back: Normal range of motion and neck supple.      Lumbar back: Normal. No spasms.      Comments: Bends 90 degrees at  waist, shoulders and knees good range of motion without crepitance.  No pitting edema to lower extremities.   Lymphadenopathy:      Cervical: No cervical adenopathy.   Skin:     General: Skin is warm and dry.      Findings: No rash.   Neurological:      Mental Status: He is alert and oriented to person, place, and time.      Cranial Nerves: No cranial nerve deficit.      Comments: No tremor or cogwheeling, has some mild confusion and short-term memory loss.   Psychiatric:         Behavior: Behavior normal.         Thought Content: Thought content normal.         Judgment: Judgment normal.         Assessment:       1. Mild late onset Alzheimer's dementia without behavioral disturbance, psychotic disturbance, mood disturbance, or anxiety    2. Hypertension, unspecified  type    3. Cardiac arrhythmia, unspecified cardiac arrhythmia type    4. Memory loss    5. Mixed hyperlipidemia    6. Benign prostatic hyperplasia without lower urinary tract symptoms    7. Kidney stone on left side    8. Abdominal aortic aneurysm (AAA) without rupture, unspecified part    9. Primary insomnia    10. 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  Hypertension, unspecified type  -     lisinopriL-hydrochlorothiazide (PRINZIDE,ZESTORETIC) 10-12.5 mg per tablet; Take 1 tablet by mouth once daily.  Dispense: 90 tablet; Refill: 3  Blood pressure well controlled  Cardiac arrhythmia, unspecified cardiac arrhythmia type  -     POCT EKG 12-LEAD (NOT FOR [facility] USE)  EKG shows sinus rhythm first-degree AV block with occasional PVCs.  Memory loss    Mixed hyperlipidemia    Benign prostatic hyperplasia without lower urinary tract symptoms    Kidney stone on left side    Abdominal aortic aneurysm (AAA) without rupture, unspecified part    Primary insomnia    PVC's (premature ventricular contractions)      No follow-ups on file.        4/19/2023 Yonas Alaniz

## 2023-05-10 ENCOUNTER — OFFICE VISIT (OUTPATIENT)
Dept: NEUROLOGY | Facility: CLINIC | Age: 83
End: 2023-05-10
Payer: MEDICARE

## 2023-05-10 DIAGNOSIS — R41.3 MEMORY LOSS: Primary | ICD-10-CM

## 2023-05-10 PROCEDURE — 99499 UNLISTED E&M SERVICE: CPT | Mod: 95,,, | Performed by: PSYCHIATRY & NEUROLOGY

## 2023-05-10 PROCEDURE — 99499 NO LOS: ICD-10-PCS | Mod: 95,,, | Performed by: PSYCHIATRY & NEUROLOGY

## 2023-05-10 NOTE — PROGRESS NOTES
NEUROPSYCHOLOGY CONSULT (TELEHEALTH)    Referral Information  Name: Nino Mackay  MRN: [id number]  : 1940  Age: 83 y.o.  Race: White  Gender: male  Referring Provider: CARON Sinha  Billing: See below for details as coding/billing has changed   Telemedicine:   The patient location is: [place], [place]  The provider location is: [place], [place]  The chief complaint leading to consultation/medical necessity is: Cognitive concerns  Visit type: Virtual visit with synchronous audio only (telephone)  Total time spent with patient: 41 minutes  The reason for the audio only service rather than synchronous audio and video virtual visit was related to technical difficulties or patient preference/necessity.     Each patient to whom I provide medical services by telemedicine is:  (1) informed of the relationship between the physician and patient and the respective role of any other health care provider with respect to management of the patient; and (2) notified that they may decline to receive medical services by telemedicine and may withdraw from such care at any time. Patient verbally consented to receive this service via voice-only telephone call.    This service was not originating from a related E/M service provided within the previous 7 days nor will  to an E/M service or procedure within the next 24 hours or my soonest available appointment.  Prevailing standard of care was able to be met in this audio-only visit.    Consent/Emergency Plan: The patient expressed an understanding of the purpose of the evaluation and consented to all procedures. I informed the patient of limits to confidentiality and discussed an emergency plan. His wife was present on the call.    SUMMARY/TREATMENT PLAN   Results from the interview indicate the following diagnoses and treatment plan recommendations. The patient is able to follow a treatment plan without help from family.    Diagnoses/Plan:  Problem List Items  "Addressed This Visit          Neuro    Memory loss - Primary     Summary/Recommendations:  Mr. Mackay and his family reported approximately 16 months of cognitive change. Performance on a brief mental status screening measure improved between October 2022 and February 2023, associated with an improvement in consistency and quality of sleep and other lifestyle changes.  He remains independent in activities of daily living.    Mr. Mackay will complete a neuropsychological evaluation on 5/17/2023.    Thank you for allowing me to participate in Mr. Mackay's care.  If you have any questions, please contact me at [phone].     Jerica Mace, Ph.D., ABPP  Board Certified in Clinical Neuropsychology  [facility] - Department of Neurology    HISTORY OF PRESENT ILLNESS AND CURRENT SYMPTOMS     Mr. Mackay has active problems noted below. He initially visited Neurology on 2/27/2023, accompanied by his spouse and daughter. Performance on a brief mental status measure was within normal limits (Mini Mental Status Exam [MMSE]=27/30), which was an improvement from performance in October 2022 by his primary care physician (17/30). Physical exam was unremarkable.    Cognitive Symptoms:  Type/Examples:   Attention: No problems noted.  Mental Speed: No problems noted.  Memory: He reported "some memory loss." He uses scraps of paper to write important information, with good effect. His wife said he has good recall of events in the past. His wife said they keep an accurate calendar, which helps. She writes where she is when she leaves, so he does not call her.  Language: He reported some word-finding difficulty; clues help. He occasionally mixes up words. No difficulty with comprehension.  Visuospatial/Perceptual: His wife mentioned he has difficulty controlling the television remote with a new television; no other difficulties. He is not getting lost in familiar places.  Executive Functioning: He has always " "been "a pack rat," according to his wife. He understands his organizational system.  Onset:  Onset in mid to late 2022, in the context of poor sleep  Course: They noted improvement with improved sleep    Current Functional Status/Needs:  ADLs  Self-Care Eating Safety Other   Independent Independent Independent      Instrumental IADLs:   Driving Medications/Health Household Finances   He is no longer driving due to dizziness and vertigo. This has been improving. Independent Independent Spouse manages, longstanding     Psychiatric/Behavioral Symptoms:  Mood:  Depression/Dysphoria Anxiety/Fearfulness Irritability   None reported None reported Records noted irritability at times; his wife said it occurs rarely when he cannot think of a word and is not concerning.     Behavior:  Agitation/Resistance Delusions/Paranoia Hallucinations   None reported None reported None reported     Apathy/Motivation Repetitive/Restlessness Other   None reported None reported His wife said he is very friendly with strangers, which is a change.     Neurovegetative:  Sleep/Nighttime  Appetite Energy   Records noted improved sleep with trazodone, which started in October 2022; previously having nightmares. He also turns off his radio at night and sleeps well.  None reported. He and his wife made dietary adjustments to follow the Mediterranean Diet. He is walking regularly with the dog and lifts weights.     Suicidal/Homicidal Ideation: None reported    Physical Symptoms: He reported improvement in dizziness and vertigo. No changes in hearing or vision.     PERTINENT BACKGROUND INFORMATION   SOCIAL HISTORY    Family Status: ; one daughter  Current Living Situation: Lives with wife and new/old dog and two cats  Primary Source of Support: family  Daily Activities: cares for dog; walks; plays guitar; artist  Stressors: none reported  Other Factors:  Educational Level: He reported some difficulty with math in school; he did not repeat a " grade and had a scholarship to college for tennis ([employer] in [place]). Graduated high school; graduated college.  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  Family Neurologic History: Negative for heritable risk factors; his father had some cognitive change right before he passed away at age 99.  Family Psychiatric History: Negative for heritable risk factors    MEDICAL STATUS  Patient Active Problem List   Diagnosis    Kidney stone on left side    Infrarenal abdominal aortic aneurysm (AAA) without rupture    Hypertension    Mixed hyperlipidemia    Benign prostatic hyperplasia without lower urinary tract symptoms    Reactive depression    Primary insomnia    Abdominal aortic aneurysm (AAA) without rupture, unspecified part    Memory loss    Mild late onset Alzheimer's dementia    PVC's (premature ventricular contractions)     Past Medical History:   Diagnosis Date    Enlarged prostate     Hypertension     Kidney stone     Urinary tract infection      Past Surgical History:   Procedure Laterality Date    COLONOSCOPY      EXTRACORPOREAL SHOCK WAVE LITHOTRIPSY Left 5/29/2019    Procedure: LITHOTRIPSY, ESWL;  Surgeon: Kobi Troncoso MD;  Location: [facility] OR;  Service: Urology;  Laterality: Left;    HIP SURGERY  1990    JOINT REPLACEMENT      PROSTATECTOMY  2017    TONSILLECTOMY       Updated/Relevant Neurologic History:  Falls: One fall in the bathroom approximately 5 years ago  TBI: None reported  Seizures: None reported  Stroke: None reported  Movement Concerns: None reported  Prior Neuropsychological Testing: None reported  Referral Diagnosis: R41.3 (ICD-10-CM) - Memory loss    Recent Labs  Lab Results   Component Value Date    [id number] 319 02/27/2023     Lab Results   Component Value  Date    RPR Non-reactive 02/27/2023     Lab Results   Component Value Date    FOLATE 16.3 02/27/2023     Lab Results   Component Value Date    TSH 1.700 10/25/2022     No results found for: LABA1C, HGBA1C  No results found for: HIV1X2, [id number]    Imaging    Results for orders placed or performed during the hospital encounter of 03/10/23   MRI Brain Without Contrast    Narrative    MR BRAIN WITHOUT IV CONTRAST    CLINICAL HISTORY:  83 years Male memory loss    COMPARISON: None    FINDINGS: Diffusion-weighted imaging is negative for acute ischemia or focal lesion. Gradient-echo images show no evidence of intracranial hemorrhage.    Mild cerebral atrophy with associated ventricular and sulcal enlargement. Mild periventricular and scattered deep white matter T2/FLAIR hyperintensities consistent with microangiopathic change. Cerebellar hemispheres and brainstem are unremarkable. Major intracranial T2 flow-voids are patent.    Mastoid air cells are clear. Visualized paranasal sinuses are clear.    No bone marrow signal abnormality. Pituitary gland is within normal limits.    IMPRESSION:    Negative for acute ischemia or acute intracranial pathology.    Mild white matter microangiopathic changes.    Mild cerebral atrophy.    Electronically signed by:  Agustín Stanley MD  3/10/2023 2:52 PM CST Workstation: [id number]     Current Outpatient Medications:     amLODIPine (NORVASC) 5 MG tablet, TK 1 T PO D, Disp: , Rfl: 3    aspirin (ECOTRIN) 81 MG EC tablet, aspirin 81 mg tablet,delayed release  Take 1 tablet every day by oral route., Disp: , Rfl:     carvediloL (COREG) 3.125 MG tablet, TK 1 T PO D Strength: 3.125 mg, Disp: 180 tablet, Rfl: 2    cloNIDine (CATAPRES) 0.1 MG tablet, See Instructions, 1 tablet daily as needed if systolic blood pressure [id number], # 90 tab, 3 Refill(s), Maintenance, Pharmacy: [facility] [id number] Strength: 0.1 mg, Disp: 90 tablet, Rfl: 1    donepeziL (ARICEPT) 10 MG tablet, Take 1 tablet (10  mg total) by mouth every evening., Disp: 30 tablet, Rfl: 11    fish oil-omega-3 fatty acids 300-1,000 mg capsule, Take by mouth once daily., Disp: , Rfl:     glucosamine-chondroitin 500-400 mg tablet, Take 1 tablet by mouth 3 (three) times daily., Disp: , Rfl:     lisinopriL-hydrochlorothiazide (PRINZIDE,ZESTORETIC) 10-12.5 mg per tablet, Take 1 tablet by mouth once daily., Disp: 90 tablet, Rfl: 3    lovastatin (MEVACOR) 20 MG tablet, TK 1 T PO HS Strength: 20 mg, Disp: 90 tablet, Rfl: 2    meclizine (ANTIVERT) 25 mg tablet, Take 1 tablet (25 mg total) by mouth 3 (three) times daily as needed for Dizziness., Disp: 30 tablet, Rfl: 0    omega 3-dha-epa-fish oil (FISH OIL) 100-160-1,000 mg Cap,  1 cap, Oral, Daily, # 100 cap, 0 Refill(s), Disp: , Rfl:     potassium chloride (KLOR-CON) 10 MEQ TbSR, TK 2 TS PO BID Strength: 10 mEq, Disp: 360 tablet, Rfl: 1    potassium chloride (MICRO-K) 10 MEQ CpSR,  See Instructions, TAKE 2 TABLETS TWICE DAILY (DOSE CHANGE), # 360 tab, 0 Refill(s), Pharmacy: [facility], 182, cm, 04/12/21 9:23:00 CDT, Height/Length Measured, 84.7, kg, 04/12/21 9:23:00 CDT, Weight Dosing, Disp: , Rfl:     tamsulosin (FLOMAX) 0.4 mg Cap, Take 2 capsules (0.8 mg total) by mouth once daily., Disp: 180 capsule, Rfl: 3    traZODone (DESYREL) 50 MG tablet, Take 1 tablet (50 mg total) by mouth every evening., Disp: 90 tablet, Rfl: 3    Updated/Relevant Psychiatric History: None reported    Substance Use: Smokes one cigar per day but does not inhale. A glass or two of wine in the evening.    MENTAL STATUS AND OBSERVATIONS:  APPEARANCE: Not assessed  ALERTNESS/ORIENTATION: Attentive and alert. Fully oriented (x5) to time and place  GAIT: Not assessed  MOTOR MOVEMENTS/MANNERISMS: Not assessed  SPEECH/LANGUAGE: Normal in rate, rhythm, tone, and volume. No significant word finding difficulty noted. Expressive and receptive language was normal.  STATED MOOD/AFFECT: The patients stated mood was  ""fine." Affect was congruent with stated mood.   INTERPERSONAL BEHAVIOR: Rapport was quickly and easily established   SUICIDALITY/HOMICIDALITY: Denied  HALLUCINATIONS/DELUSIONS: None evidenced or endorsed  THOUGHT PROCESSES/INSIGHT: Thoughts seemed logical and goal-directed.     BILLING  Service Description CPT Code Minutes Units   Psychiatric diagnostic evaluation by physician [id number]  0   Neurobehavioral status exam by physician [phone]   Each additional hour by physician [id number]  0     "

## 2023-05-15 DIAGNOSIS — F51.01 PRIMARY INSOMNIA: ICD-10-CM

## 2023-05-15 DIAGNOSIS — R41.3 MEMORY LOSS: ICD-10-CM

## 2023-05-15 RX ORDER — DONEPEZIL HYDROCHLORIDE 10 MG/1
10 TABLET, FILM COATED ORAL NIGHTLY
Qty: 90 TABLET | Refills: 3 | Status: SHIPPED | OUTPATIENT
Start: 2023-05-15 | End: 2024-02-26

## 2023-05-15 RX ORDER — TRAZODONE HYDROCHLORIDE 50 MG/1
50 TABLET ORAL NIGHTLY
Qty: 90 TABLET | Refills: 3 | Status: SHIPPED | OUTPATIENT
Start: 2023-05-15 | End: 2024-01-05 | Stop reason: SDUPTHER

## 2023-05-15 NOTE — TELEPHONE ENCOUNTER
----- Message from Jennifer Chou sent at 5/15/2023 11:15 AM CDT -----  Contact: [facility]ino  Refill for Donepezil 10 mg, Trazadone 50mg. [facility] out of medicine. Santino @[phone]. Future refills need to go to [facility]

## 2023-05-16 NOTE — PROGRESS NOTES
NEUROPSYCHOLOGY CONSULT    Referral Information  Name: Nino Mackay  MRN: [id number]  : 1940  Age: 83 y.o.  Race: White  Gender: male  Dominant Hand: Left  Referring Provider: Rom Rincon  [address] [facility] [address]  [place] [address]  Billing: See below for details as coding/billing has changed   Referral Reason/Medical Necessity: Neuropsychological evaluation to assess current cognitive functioning, aid in differential diagnosis, and provide treatment recommendations in the context of reported cognitive changes.  Consent/Emergency Plan: The patient expressed an understanding of the purpose of the evaluation and consented to all procedures. I informed the patient of limits to confidentiality and discussed an emergency plan.    SUMMARY/TREATMENT PLAN   Results from the interview indicate the following diagnoses and treatment plan recommendations. The patient may not have the ability to follow a treatment plan without help from family.    Diagnoses/Plan:  Problem List Items Addressed This Visit          Neuro    Other symptoms and signs involving cognitive functions and awareness     Summary/Conclusions:  Mr. Mackay and his family reported an onset of cognitive change in mid to late . Performance on a brief mental status screening measure improved between 2022 and 2023, associated with an improvement in consistency and quality of sleep and other lifestyle changes. He remains independent in activities of daily living.    Neuropsychological assessment results were interpreted in the context of estimated high average premorbid abilities and variable performance validity. Within that context, he demonstrated intact auditory attention and variable working memory. Processing speed was largely consistent with premorbid estimates. Executive functioning was variable. He demonstrated reduced auditory comprehension and naming on language testing. Visuospatial functioning was intact.  Fine motor speed and dexterity were intact using his dominant hand and relatively reduced using his nondominant hand. Learning, recall, and recognition of verbal information were reduced. Learning of visual information was intact, with reduced retention and some benefit from recognition. He did not endorse significant anxiety or depression on self-report measures.    In sum, Mr. Mackay demonstrated reductions or relative reductions in aspects of working memory, executive functioning, language, fine motor speed, and learning and memory. Patterns of performance were outside of expectation based on performance validity and on reports from Mr. aMckay and his wife of improvement in cognitive abilities with improved quality of sleep and other lifestyle changes. He appeared dysphoric and quite frustrated with his performance during testing, which may have impacted his scores. Although patterns of performance do raise suspicion for a mild neurocognitive disorder, diagnosis is deferred given these discrepancies, and continued monitoring over time is warranted.    Recommendations:   Neuropsychology Follow-up: Mr. Mackay was overwhelmed by the testing process and does not plan to repeat testing in the future. He and his wife were encouraged to reach out if they had questions or wanted to discuss questions in the future.    Medical Follow-Up: Continue usual follow up for current active medical issues.     Recommendations for Mr. Mackay:  Attention: Remember that inattention and lack of focus are major culprits to forgetting information so be sure and practice paying attention for adequate learning of information. If you rely on passive attention to remembering something (e.g., yulia, uh-huh approach), youll find you cannot recall it later. I recommend the following to improve attention, which may aid in later recall:   Reduce distractions in the area as much as possible.  Look at the person as they are  speaking to you.   Paraphrase as they are speaking  Write down important pieces of information   Ask them to repeat if you zone out.   Have them simplify and reduce information that you need to attend to during conversation.   Have visual cues to remind you if you need to do something later.  Processing Speed:   Using multiple modalities (e.g., listening, writing notes, asking questions, recording) to learn new information is likely to allow additional time for processing, thus improving memory for the material.   Allowing sufficient time to complete tasks will reduce frustration and help to ensure completion.  Executive Functioning:  Dont attempt to multi-task.  Separate tasks so that each can be completed one at a time.  Consider using a calendar/day planner, as that may be effective to help you plan and stay on track.  Color-coding specific tasks by importance may add additional benefit to your planner.  Break down large projects into smaller tasks and write down the steps to completing the task.  Taking notes while reading can help with recall.  Storing Information: Use the below strategies to help you further enhance how information is stored.  Rehearse - Immediately after seeing/hearing something, try to recall it.  Wait a few minutes, then check again.  Gradually lengthen the intervals between rehearsals.  Repetition of learned material is critical to ensure storage of information to be learned. Self-test at home to ensure learning.  Write down important information to improve your attention and focus and to have something to look back on when you need to recall it.  Make sure the person doesnt rattle off, but presents in a clear, logical, and unhurried manner.   Recalling Information:  Jog your memory - Lose something?  Think back to when you last had it.  What did you do next?  And after that?  Mentally walk yourself through each activity that followed.  Prodding your memory this way may enable you to  recall the location of the missing item.  Use a cue - Symbolic reminders (the proverbial string around the finger) are helpful.  So too are memos, timers, calendar notes, etc.--keep them in visible, appropriate places.  Get organized - Have fixed locations for all important papers, key phone numbers, medications, keys, wallet, glasses, tools, etc.  Develop routines - Routines can anchor memories so they do not drift away.    Sleep Hygiene: Poor sleep has a negative effect on cognition. Several strategies have been shown to improve sleep:   Caffeine intake in the afternoon and evening, as well as stuffing oneself at supper, can decrease the quality of restful sleep throughout the night.   Bedtime and wake-up times should be consistent every night and morning so the body becomes used to a single routine, even on the weekends.  Engage in daily physical activity, but not 2-3 hours before bedtime.   No technology use (television, computer, iPad) 1-2 hours before bed.   Have a wind down routine (e.g., soft lights in the house, bath before bed, reduced fluid intake, songs, reading, less noise) to promote sleep readiness.   Visit the www.sleepfoundation.org for more strategies.     Practice good cognitive/brain health hygiene:  Engage in regular exercise, which increases alertness and arousal and can improve attention and focus.    Get a good nights sleep, as this can enhance alertness and cognition.  Eat healthy foods and balanced meals. It is notable that research indicates certain nutrients may aid in brain function, such as B vitamins (especially B6, B12, and folic acid), antioxidants (such as vitamins C and E, and beta carotene), and Omega-3 fatty acids. Talk with your physician or nutritionist about whats right for you.   Keep your brain active. Find activities to stay mentally active, such as reading, games (cards, checkers), puzzles (crosswords, Sudoku, jig saw), crafts (models, woodworking), gardening, or  participating in activities in the community.  Stay socially engaged. Continue staying active with your family and friends.    Managing Vascular Risk Factors: A personal history of disorders that affect the cardiovascular system (e.g., hypertension, hyperlipidemia, diabetes) can have a negative impact on brain functioning especially over many years. Therefore, it is very important for Mr. Mackay to maintain good control over risk factors. The following is recommended:  Take all medications as prescribed and follow-up with recommendations above.  Get regular physical exercise to the extent that it is possible.   Continue following a Mediterranean diet, which emphasizes plant-based foods, whole grains, fish and healthy fats, such as olive oil, and has been shown to be brain protective.   Check blood pressure, cholesterol levels, blood sugar, and others as appropriate.    Prepare for the future: Mr. Mackay and caregivers should consider formal arrangements to allow a designated person to make medical and financial decisions for Mr. Mackay, should he become unable to do so.  Options to consider include designating a healthcare proxy, medical and/or financial power of , and completing advanced directives for healthcare decisions and estate planning (e.g., finalizing a will).  If cost is prohibitive, [facility] Legal Services (https://[email or web address].[email or web address]/) provides free  for individuals with low income.     Recommendations: Consider stress management techniques to reduce irritability and respond to it as it arises. Heres a simple three-minute exercise you can use no matter where you are:  1. Sit or stand up nice and tall. Let your eyes relax. Relax your jaw. Let your shoulders relax down your back and start to focus your attention on your breath.  2. Breathe all the way in through your nose, filling your belly with your breath. Then, breathe all the way out through your nose. Its that  "simple.  3. Start to breathe into a count of three. Inhale for one, two, three. Then, exhale into a count of six: six, five, four, three, two and one.  4. Repeat. Inhale: one, two, three. Exhale: six, five, four, three, two and one.  5. Continue just like this for three minutes, or as long as you'd like. You can set a timer on your phone at the beginning of your practice.    Should your mind wander, simply notice, without judgment. Observe your thought. And let it go. Return your breath to your attention as many times as it takes, training your mind in the same way we train our bodies.    https://blog.[facility].org/articles/3-minute-mindful-breathing-exercise-for-anxiety-relief    https://blog.[facility].org/articles/shake-it-off-in-the-new-year-simple-stress-relief-techniques     Thank you for allowing me to participate in Mr. Mackay's care.  If you have any questions, please contact me at [phone].    Jerica Mace, Ph.D., ABPP  Board Certified in Clinical Neuropsychology  [facility] - Department of Neurology    HISTORY OF PRESENT ILLNESS AND CURRENT SYMPTOMS     Mr. Mackay completed an initial interview via telehealth on 5/10/2023. The background information is taken from that interview, with updates.    Mr. Mackay has active problems noted below. He initially visited Neurology on 2/27/2023, accompanied by his spouse and daughter. Performance on a brief mental status measure was within normal limits (Mini Mental Status Exam [MMSE]=27/30), which was an improvement from performance in October 2022 with his primary care physician (17/30). Physical exam was unremarkable.    Cognitive Symptoms:  Type/Examples:   Attention: No problems noted.  Mental Speed: No problems noted.  Memory: He reported "some memory loss." He uses scraps of paper to write important information, with good effect. His wife said he has good recall of events in the past. His wife said they keep an accurate calendar, which " "helps. She writes where she is when she leaves, so he does not call her.  Language: He reported some word-finding difficulty; clues help. He occasionally mixes up words. No difficulty with comprehension.  Visuospatial/Perceptual: His wife mentioned he has difficulty controlling the television remote with a new television; no other difficulties. He is not getting lost in familiar places.  Executive Functioning: He has always been "a pack rat," according to his wife. He understands his organizational system.  Onset:  Onset in mid to late 2022, in the context of poor sleep  Course: They noted improvement with improved sleep    Current Functional Status/Needs:  ADLs  Self-Care Eating Safety Other   Independent Independent Independent      Instrumental IADLs:   Driving Medications/Health Household Finances   He is no longer driving due to dizziness and vertigo. This has been improving. Independent Independent Spouse manages, longstanding     Psychiatric/Behavioral Symptoms:  Mood:  Depression/Dysphoria Anxiety/Fearfulness Irritability   None reported None reported Records noted irritability at times; his wife said it occurs rarely when he cannot think of a word and is not concerning.     Behavior:  Agitation/Resistance Delusions/Paranoia Hallucinations   None reported None reported None reported     Apathy/Motivation Repetitive/Restlessness Other   None reported None reported His wife said he is very friendly with strangers, which is a change.     Neurovegetative:  Sleep/Nighttime  Appetite Energy   Records noted improved sleep with trazodone, which started in October 2022; previously having nightmares. He also turns off his radio at night and sleeps well.  None reported. He and his wife made dietary adjustments to follow the Mediterranean Diet. He is walking regularly with the dog and lifts weights.     Suicidal/Homicidal Ideation: None reported    Physical Symptoms: He reported improvement in dizziness and vertigo. No " changes in hearing or vision.     PERTINENT BACKGROUND INFORMATION   SOCIAL HISTORY    Family Status: ; one daughter  Current Living Situation: Lives with wife and new/old dog and two cats  Primary Source of Support: family  Daily Activities: cares for dog; walks; plays guitar; artist  Stressors: none reported  Other Factors:  Educational Level: He reported some difficulty with math in school; he did not repeat a grade and had a scholarship to college for tennis ([employer] in [place]). Graduated high school; graduated college.  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  Family Neurologic History: Negative for heritable risk factors; his father had some cognitive change right before he passed away at age 99.  Family Psychiatric History: Negative for heritable risk factors    MEDICAL STATUS  Patient Active Problem List   Diagnosis    Kidney stone on left side    Infrarenal abdominal aortic aneurysm (AAA) without rupture    Hypertension    Mixed hyperlipidemia    Benign prostatic hyperplasia without lower urinary tract symptoms    Reactive depression    Primary insomnia    Abdominal aortic aneurysm (AAA) without rupture, unspecified part    Other symptoms and signs involving cognitive functions and awareness    Mild late onset Alzheimer's dementia    PVC's (premature ventricular contractions)     Past Medical History:   Diagnosis Date    Enlarged prostate     Hypertension     Kidney stone     Urinary tract infection      Past Surgical History:   Procedure Laterality Date    COLONOSCOPY      EXTRACORPOREAL SHOCK WAVE LITHOTRIPSY Left 5/29/2019    Procedure: LITHOTRIPSY, ESWL;  Surgeon: Kobi Troncoso MD;  Location: [facility] OR;  Service: Urology;  Laterality: Left;    HIP SURGERY  1990     JOINT REPLACEMENT      PROSTATECTOMY  2017    TONSILLECTOMY       Updated/Relevant Neurologic History:  Falls: One fall in the bathroom approximately 5 years ago  TBI: None reported  Seizures: None reported  Stroke: None reported  Movement Concerns: None reported  Prior Neuropsychological Testing: None reported  Referral Diagnosis: R41.3 (ICD-10-CM) - Memory loss    Recent Labs  Lab Results   Component Value Date    ODGXLQIH40 319 02/27/2023     Lab Results   Component Value Date    RPR Non-reactive 02/27/2023     Lab Results   Component Value Date    FOLATE 16.3 02/27/2023     Lab Results   Component Value Date    TSH 1.700 10/25/2022     No results found for: LABA1C, HGBA1C  No results found for: HIV1X2, [id number]    Imaging    Results for orders placed or performed during the hospital encounter of 03/10/23   MRI Brain Without Contrast    Narrative    MR BRAIN WITHOUT IV CONTRAST    CLINICAL HISTORY:  83 years Male memory loss    COMPARISON: None    FINDINGS: Diffusion-weighted imaging is negative for acute ischemia or focal lesion. Gradient-echo images show no evidence of intracranial hemorrhage.    Mild cerebral atrophy with associated ventricular and sulcal enlargement. Mild periventricular and scattered deep white matter T2/FLAIR hyperintensities consistent with microangiopathic change. Cerebellar hemispheres and brainstem are unremarkable. Major intracranial T2 flow-voids are patent.    Mastoid air cells are clear. Visualized paranasal sinuses are clear.    No bone marrow signal abnormality. Pituitary gland is within normal limits.    IMPRESSION:    Negative for acute ischemia or acute intracranial pathology.    Mild white matter microangiopathic changes.    Mild cerebral atrophy.    Electronically signed by:  Agustín Stanley MD  3/10/2023 2:52 PM [facility] Workstation: [id number]     Current Outpatient Medications:     amLODIPine (NORVASC) 5 MG tablet, TK 1 T PO D, Disp: , Rfl: 3    aspirin (ECOTRIN) 81 MG EC  tablet, aspirin 81 mg tablet,delayed release  Take 1 tablet every day by oral route., Disp: , Rfl:     carvediloL (COREG) 3.125 MG tablet, TK 1 T PO D Strength: 3.125 mg, Disp: 180 tablet, Rfl: 2    cloNIDine (CATAPRES) 0.1 MG tablet, See Instructions, 1 tablet daily as needed if systolic blood pressure [id number], # 90 tab, 3 Refill(s), Maintenance, Pharmacy: [facility] [id number] Strength: 0.1 mg, Disp: 90 tablet, Rfl: 1    donepeziL (ARICEPT) 10 MG tablet, Take 1 tablet (10 mg total) by mouth every evening., Disp: 90 tablet, Rfl: 3    fish oil-omega-3 fatty acids 300-1,000 mg capsule, Take by mouth once daily., Disp: , Rfl:     glucosamine-chondroitin 500-400 mg tablet, Take 1 tablet by mouth 3 (three) times daily., Disp: , Rfl:     lisinopriL-hydrochlorothiazide (PRINZIDE,ZESTORETIC) 10-12.5 mg per tablet, Take 1 tablet by mouth once daily., Disp: 90 tablet, Rfl: 3    lovastatin (MEVACOR) 20 MG tablet, TK 1 T PO HS Strength: 20 mg, Disp: 90 tablet, Rfl: 2    meclizine (ANTIVERT) 25 mg tablet, Take 1 tablet (25 mg total) by mouth 3 (three) times daily as needed for Dizziness., Disp: 30 tablet, Rfl: 0    omega 3-dha-epa-fish oil (FISH OIL) 100-160-1,000 mg Cap,  1 cap, Oral, Daily, # 100 cap, 0 Refill(s), Disp: , Rfl:     potassium chloride (KLOR-CON) 10 MEQ TbSR, TK 2 TS PO BID Strength: 10 mEq, Disp: 360 tablet, Rfl: 1    potassium chloride (MICRO-K) 10 MEQ CpSR,  See Instructions, TAKE 2 TABLETS TWICE DAILY (DOSE CHANGE), # 360 tab, 0 Refill(s), Pharmacy: [facility], 182, cm, 04/12/21 9:23:00 CDT, Height/Length Measured, 84.7, kg, 04/12/21 9:23:00 CDT, Weight Dosing, Disp: , Rfl:     tamsulosin (FLOMAX) 0.4 mg Cap, Take 2 capsules (0.8 mg total) by mouth once daily., Disp: 180 capsule, Rfl: 3    traZODone (DESYREL) 50 MG tablet, Take 1 tablet (50 mg total) by mouth every evening., Disp: 90 tablet, Rfl: 3    Updated/Relevant Psychiatric History: None reported    Substance Use: Smokes one  "cigar per day but does not inhale. A glass or two of wine in the evening.    MENTAL STATUS AND OBSERVATIONS:  APPEARANCE: Casually and appropriately dressed and groomed. He wore reading glasses.  ALERTNESS/ORIENTATION: Attentive and alert. Fully oriented to place; mostly oriented to time but misstated the date  GAIT: Unremarkable  MOTOR MOVEMENTS/MANNERISMS: Mild tremor on testing  SPEECH/LANGUAGE: Normal in rate, rhythm, tone, and volume. No significant word finding difficulty noted. Expressive and receptive language was normal.  STATED MOOD/AFFECT: The patients stated mood was "fine." Affect was congruent with stated mood.   INTERPERSONAL BEHAVIOR: Rapport was quickly and easily established   SUICIDALITY/HOMICIDALITY: Denied  HALLUCINATIONS/DELUSIONS: None evidenced or endorsed  THOUGHT PROCESSES/INSIGHT: Thoughts seemed logical and goal-directed.     TEST TAKING BEHAVIOR and VALIDITY: Mr. Mackay was cooperative with test procedures. He hung his head throughout most of the evaluation, with minimal eye contact. He sometimes gave up easily, though he responded to encouragement. He often got frustrated with himself when he had difficulty with a task and frequently said, "I'm going crazy." He at times exhibited confusion and forgetting of questions and task instructions and required repetition and/or clarification. Scores on stand-alone and embedded performance validity measures were variable, with one stand-alone and one embedded measure outside of expectation for his reported level of cognitive and functional ability. The current results, therefore, may be an underestimate of the patient's current functioning.    PROCEDURES/TESTS ADMINISTERED:  In addition to performing a review of pertinent medical records, reviewing limits to confidentiality, conducting a clinical interview, and explaining procedures, the following measures were administered: MSVT; Test of Premorbid Functioning; Word Choice; CITH; DCT; Mini " Mental Status Exam (MMSE); Nimesh Cognitive Assessment (MoCA); Wechsler Adult Intelligence Scale, Fourth Edition (WAIS-IV) selected subtests; Wechsler Memory Scale, Fourth Edition (WMS-IV), selected subtests; Neuropsychological Assessment Battery (NAB), selected subtests; [place] Naming Test (BNT-60; Chelsea et al., 2004 norms ); Verbal fluency tests (FAS & animal naming; Chelsea et al., 2004 norms); Bailey Verbal Learning Test, Revised (HVLT-R; Form 1); [place] Making Test, parts A and B (Chelsea et al., 2004 norms); [place] Card Sorting Test-64 (WCST-64), TOMM, Grooved Pegboard Test (Chelsea et al., 2004 norms); Fish Complex Figure Test (RCFT, copy); Clock Drawing and Copy; Geriatric Depression Scale (GDS-30); Beltran Anxiety Inventory (YOSEPH), Beltran Depression Inventory, Second Edition (BDI-II), Generalized Anxiety Disorder Questionnaire (ANDREW-7). Manual norms were used unless otherwise indicated.      TEST RESULTS    PREMORBID FUNCTIONING Raw Score Standardized Score Percentile/CP   TOPF simple + pred. eFSIQ - 113 81   INTELLECTUAL FUNCTIONING Raw Score Standardized Score Percentile/CP   WAIS-IV      WMI - 86 18   PSI - 94 34   COGNITIVE SCREENING Raw Score Standardized Score Percentile/CP   Orientation Questions 9/10 - -   LANGUAGE FUNCTIONING Raw Score Standardized Score Percentile/CP   TOPF Word Reading 55 113 81   NAB Auditory Comprehension 81 22 0.3   NAB Auditory Comprehension Colors 13 - 100   NAB Auditory Comprehension Shapes 21 - 10   NAB Auditory Comprehension Colors/Shapes/Numbers 21 - 100   NAB Auditory Comprehension Pointing 6 - 100   NAB Auditory Comprehension Yes/No 10 - 100   NAB Auditory Comprehension Paper Folding 10 - 1   NAB Naming 13 19 <0.1   NAB Naming Percent Correct After Semantic Cuing 0 - 38   NAB Naming Percent Correct After Phonemic Cuing 33 - 27   FAS 28 39 14   Animal Naming 8 25 1   VISUOSPATIAL FUNCTIONING Raw Score Standardized Score Percentile/CP   RCFT Copy 29 - >16   RCFT Time to Copy  247 - >16   VR Copy 41 - 51-75   LEARNING & MEMORY Raw Score Standardized Score Percentile/CP   HVLT-R      Total Immediate 9 24 0.5   Delayed Recall 1 23 0.3   Retention % 25 <20 0.1   Hits 12 - -   False Positives 10 - -   Discrimination  2 <20 0.1   WMS-IV Subtests      LM I 10 4 2   LM II 1 4 2   LM Recognition 11 - 3-9   VR I 23 9 37   VR II 2 5 5   VR II Recognition 2 - 17-25   MSVT      IR 95 - -   DR 85 - -   Cons 80 - -   PA 40 - -   FR 30 - -   ATTENTION/WORKING MEMORY Raw Score Standardized Score Percentile/CP   WAIS-IV Digit Span 19 8 25         DS Forward 11 12 75         DS Backward 6 8 25         DS Sequence 2 5 5         Longest Digit Forward 8 - -         Longest Digit Backward 3 - -         Longest Digit Sequence 3 - -         RDS 8 - -   WAIS-IV Arithmetic 8 7 16   MENTAL PROCESSING SPEED Raw Score Standardized Score Percentile/CP   WAIS-IV Symbol Search 20 11 63   WAIS-IV Coding 25 7 16   TMT A  51 42 21   TMT A errors 0 - -   DCT 14 - -   EXECUTIVE FUNCTIONING Raw Score Standardized Score Percentile/CP   TMT B 229 35 7   TMT B errors 1 - -   WCST-64      Total Correct 32 - -   Total Errors 32 87 19   Perseverative Resp. 35 74 4   Perseverative Err. 28 75 5   Nonperseverative Err. 4 145 99.8   Concept. Level Response 22 88 21   Categories Completed 2 - >16   FMS 0 - N/A   Learning to Learn -26.80 - 6-10   WAIS-IV Similarities 15 7 16   WAIS-IV Matrix Reasoning 8 9 37   FRONTOMOTOR  Raw Score Standardized Score Percentile/CP   GPT  46 34   GPD [facility] 137 41 18   MOOD & PERSONALITY Raw Score Standardized Score Percentile/CP   GDS-30 1 - -   ANDREW-7 0 - -     TESTING SUMMARY: Mr. Mackay's premorbid intellectual abilities were estimated to be in the high average range based on word reading and demographic variables. Basic auditory attention when repeating digits was at least high average, with at least average working memory when reversing digits; working memory when sequencing digits was  reduced. Mental arithmetic was at least low average. Basic processing speed was at least low average. Rapid symbol matching was at least average. Visuomotor coding was at least low average. He demonstrated reduced set shifting. Verbal abstract reasoning was at least low average, with at least average nonverbal abstract reasoning. Phonemic fluency was at least low average, with reduced semantic fluency. Planning and problem solving was within normal limits overall but notable for a high number of perseverative errors. Word reading was at least high average and consistent with premorbid estimates. Auditory comprehension was reduced, with reduced performance on more complex commands. Naming was reduced, with limited benefit from recognition. Copy of simple and moderately complex designs was at least average. Copy of a complex figure was within normal limits. Fine motor speed and dexterity were at least average using his dominant hand and at least low average using his nondominant hand. Learning of a word list was reduced, with reduced retention and recognition. Learning of stories was reduced, with reduced retention and recognition. Learning of simple and moderately complex designs was at least average, with reduced retention and slightly improved recognition. He did not endorse significant anxiety or depression on self-report measures.    BILLING  Service Description CPT Code Minutes Units   Test Evaluation Services --  --   Neuropsychological testing evaluation services by physician [phone]   Each additional hour by physician [id number]  2   Test Administration and Scoring --  --   Psychological or neuropsychological test administration and scoring by physician [id number]  0   Each additional 30 minutes by physician [id number]  0   Psychological or neuropsychological test administration and scoring by technician [id number] 1   Each additional 30 minutes by technician [id number]  6

## 2023-05-17 ENCOUNTER — OFFICE VISIT (OUTPATIENT)
Dept: NEUROLOGY | Facility: CLINIC | Age: 83
End: 2023-05-17
Payer: MEDICARE

## 2023-05-17 DIAGNOSIS — R41.3 MEMORY LOSS: ICD-10-CM

## 2023-05-17 DIAGNOSIS — R41.89 OTHER SYMPTOMS AND SIGNS INVOLVING COGNITIVE FUNCTIONS AND AWARENESS: ICD-10-CM

## 2023-05-17 PROCEDURE — 96139 PSYCL/NRPSYC TST TECH EA: CPT | Mod: ICN,,, | Performed by: PSYCHIATRY & NEUROLOGY

## 2023-05-17 PROCEDURE — 99499 NO LOS: ICD-10-PCS | Mod: ,,, | Performed by: PSYCHIATRY & NEUROLOGY

## 2023-05-17 PROCEDURE — 96133 NRPSYC TST EVAL PHYS/QHP EA: CPT | Mod: ICN,,, | Performed by: PSYCHIATRY & NEUROLOGY

## 2023-05-17 PROCEDURE — 96138 PR PSYCH/NEUROPSYCH TEST ADMIN/SCORING, BY TECH, 2+ TESTS, 1ST 30 MIN: ICD-10-PCS | Mod: ICN,,, | Performed by: PSYCHIATRY & NEUROLOGY

## 2023-05-17 PROCEDURE — 96138 PSYCL/NRPSYC TECH 1ST: CPT | Mod: ICN,,, | Performed by: PSYCHIATRY & NEUROLOGY

## 2023-05-17 PROCEDURE — 99499 UNLISTED E&M SERVICE: CPT | Mod: ,,, | Performed by: PSYCHIATRY & NEUROLOGY

## 2023-05-17 PROCEDURE — 96132 PR NEUROPSYCHOLOGIC TEST EVAL SVCS, 1ST HR: ICD-10-PCS | Mod: ICN,,, | Performed by: PSYCHIATRY & NEUROLOGY

## 2023-05-17 PROCEDURE — 96139 PR PSYCH/NEUROPSYCH TEST ADMIN/SCORING, BY TECH, 2+ TESTS, EA ADDTL 30 MIN: ICD-10-PCS | Mod: ICN,,, | Performed by: PSYCHIATRY & NEUROLOGY

## 2023-05-17 PROCEDURE — 96132 NRPSYC TST EVAL PHYS/QHP 1ST: CPT | Mod: ICN,,, | Performed by: PSYCHIATRY & NEUROLOGY

## 2023-05-17 PROCEDURE — 96133 PR NEUROPSYCHOLOGIC TEST EVAL SVCS, EA ADDTL HR: ICD-10-PCS | Mod: ICN,,, | Performed by: PSYCHIATRY & NEUROLOGY

## 2023-05-22 PROBLEM — R41.89 OTHER SYMPTOMS AND SIGNS INVOLVING COGNITIVE FUNCTIONS AND AWARENESS: Status: ACTIVE | Noted: 2023-02-27

## 2023-05-23 ENCOUNTER — PATIENT MESSAGE (OUTPATIENT)
Dept: NEUROLOGY | Facility: CLINIC | Age: 83
End: 2023-05-23
Payer: MEDICARE

## 2023-05-23 NOTE — PROGRESS NOTES
NEUROPSYCHOLOGY FEEDBACK (TELEHEALTH)    Referral Information  Name: Nino Mackay  MRN: [id number]  : 1940  Age: 83 y.o.  Race: White  Gender: male  Referring Provider: CARON Sinha  Billing: See below for details as coding/billing has changed   Telemedicine:   The patient location is: [place], [place]  The provider location is: [place], [place]  The chief complaint leading to consultation/medical necessity is: Feedback from neuropsychological evaluation.  Visit type: Virtual visit with synchronous audio only (telephone)   Total time spent with patient: 31 minutes; [id number] attached to testing visit on 2023  The reason for the audio only service rather than synchronous audio and video virtual visit was related to technical difficulties or patient preference/necessity.     Each patient to whom I provide medical services by telemedicine is:  (1) informed of the relationship between the physician and patient and the respective role of any other health care provider with respect to management of the patient; and (2) notified that they may decline to receive medical services by telemedicine and may withdraw from such care at any time. Patient verbally consented to receive this service via voice-only telephone call.    This service was not originating from a related E/M service provided within the previous 7 days nor will  to an E/M service or procedure within the next 24 hours or my soonest available appointment.  Prevailing standard of care was able to be met in this audio-only visit.    Consent/Emergency Plan: The patient expressed an understanding of the purpose of the evaluation and consented to all procedures. I informed the patient of limits to confidentiality and discussed an emergency plan. His wife was present on the call.    NOTE   Nino Mackay attended a feedback session today.  We discussed the results of the neuropsychological evaluation.  I provided Mr. Mackay with a  copy of the evaluation report via CX and gave time to discuss questions and concerns.    Jerica Mace, Ph.D., ABPP  Board Certified in Clinical Neuropsychology  [facility] - Department of Neurology

## 2023-05-24 ENCOUNTER — PATIENT MESSAGE (OUTPATIENT)
Dept: NEUROLOGY | Facility: CLINIC | Age: 83
End: 2023-05-24
Payer: MEDICARE

## 2023-05-24 ENCOUNTER — OFFICE VISIT (OUTPATIENT)
Dept: NEUROLOGY | Facility: CLINIC | Age: 83
End: 2023-05-24
Payer: MEDICARE

## 2023-05-24 DIAGNOSIS — R41.89 OTHER SYMPTOMS AND SIGNS INVOLVING COGNITIVE FUNCTIONS AND AWARENESS: Primary | ICD-10-CM

## 2023-05-24 PROCEDURE — 99499 UNLISTED E&M SERVICE: CPT | Mod: 95,,, | Performed by: PSYCHIATRY & NEUROLOGY

## 2023-05-24 PROCEDURE — 99499 NO LOS: ICD-10-PCS | Mod: 95,,, | Performed by: PSYCHIATRY & NEUROLOGY

## 2023-06-19 ENCOUNTER — PATIENT MESSAGE (OUTPATIENT)
Dept: FAMILY MEDICINE | Facility: CLINIC | Age: 83
End: 2023-06-19

## 2023-06-20 ENCOUNTER — PATIENT MESSAGE (OUTPATIENT)
Dept: FAMILY MEDICINE | Facility: CLINIC | Age: 83
End: 2023-06-20

## 2023-06-22 ENCOUNTER — TELEPHONE (OUTPATIENT)
Dept: NEUROLOGY | Facility: CLINIC | Age: 83
End: 2023-06-22
Payer: MEDICARE

## 2023-08-17 ENCOUNTER — PATIENT MESSAGE (OUTPATIENT)
Dept: NEUROLOGY | Facility: CLINIC | Age: 83
End: 2023-08-17
Payer: MEDICARE

## 2023-08-22 ENCOUNTER — PATIENT MESSAGE (OUTPATIENT)
Dept: FAMILY MEDICINE | Facility: CLINIC | Age: 83
End: 2023-08-22

## 2023-08-25 ENCOUNTER — OFFICE VISIT (OUTPATIENT)
Dept: NEUROLOGY | Facility: CLINIC | Age: 83
End: 2023-08-25
Payer: MEDICARE

## 2023-08-25 VITALS — HEART RATE: 70 BPM | DIASTOLIC BLOOD PRESSURE: 65 MMHG | SYSTOLIC BLOOD PRESSURE: 129 MMHG

## 2023-08-25 DIAGNOSIS — R41.89 OTHER SYMPTOMS AND SIGNS INVOLVING COGNITIVE FUNCTIONS AND AWARENESS: ICD-10-CM

## 2023-08-25 PROCEDURE — 99483 PR ASSMT/CARE PLANNING, PT W/COGN IMPAIRMENT: ICD-10-PCS | Mod: S$GLB,,, | Performed by: NURSE PRACTITIONER

## 2023-08-25 PROCEDURE — 99999 PR PBB SHADOW E&M-EST. PATIENT-LVL IV: CPT | Mod: PBBFAC,,, | Performed by: NURSE PRACTITIONER

## 2023-08-25 PROCEDURE — 99483 ASSMT & CARE PLN PT COG IMP: CPT | Mod: S$GLB,,, | Performed by: NURSE PRACTITIONER

## 2023-08-25 PROCEDURE — 99499 NO LOS: ICD-10-PCS | Mod: S$GLB,,, | Performed by: NURSE PRACTITIONER

## 2023-08-25 PROCEDURE — 99499 UNLISTED E&M SERVICE: CPT | Mod: S$GLB,,, | Performed by: NURSE PRACTITIONER

## 2023-08-25 PROCEDURE — 99999 PR PBB SHADOW E&M-EST. PATIENT-LVL IV: ICD-10-PCS | Mod: PBBFAC,,, | Performed by: NURSE PRACTITIONER

## 2023-08-25 RX ORDER — MAGNESIUM 200 MG
500 TABLET ORAL ONCE
COMMUNITY

## 2023-08-25 NOTE — PROGRESS NOTES
NEUROLOGY  Outpatient Follow Up    [facility]  [address] [facility], [place] [address]  [phone] (office) / [phone] (fax)    Patient Name:  Nino Mackay  :  1940  MR #:  [id number]  Acct #:  [id number]    Date of Neurology Visit: 2023  Name of Provider: CARON Sinha    Other Physicians:  Yonas Alaniz MD (Primary Care Physician); No ref. provider found (Referring)      Chief Complaint: Memory Loss      History of Present Illness (HPI):    Nino Mackay is a left handed 83 y.o. male with a PMHX of HTN, Kidney stones, UTI  Patient is here today for memory loss. He is accompanied by his spouse and daughter who help supply information. He states he does forget certain words & names at times.      Patient's highest level of education completed was college. He was a musician and artist. The onset of memory issues likely began in the last 6-8 months. He struggles more with short term memory loss. Spouse states he has increased frustration and is short tempered at times. He denies depression and hallucinations. He is now taking Trazodone which does offer aid. Previously he was having nightmares, even as a youth. Trazodone does help with this. He does write things down to assist with memory.  Spouse is managing the finances and has always done so. He is managing is own medications. Every so often he may forget if he has taken a pill or not. There are no issues with hygiene and able to perform ADLs without assistance. He does have word finding difficulty. He also does have urinary frequency. He denies recent falls. He is no longer driving due to ongoing vertigo. He likes to read and plays music.  He was placed on Aricept about 5-6 months ago by his PCP for memory loss and has tolerated it well.               Interval Hx 2023:   Patient presents today or memory follow up. He is accompanied by his spouse. Overall patient states he is doing  ""fine". He states his memory is not as good as it use to be. He uses a book to recall names and times. He continues to paint for brain stimulation. He continues to have some frustration towards spouse or may be irritable at times. He denies depression and hallucinations. He continues to take Trazodone for sleep which offers aid. There is no reported RBD. Spouse is managing the finances and has always done so. He is managing is own medications. There are no issues with hygiene and able to perform ADLs without assistance. He does have word finding difficulty. He also does have urinary frequency. He denies recent falls. He is no longer driving due to ongoing vertigo. He is eating well and following a mediterranean diet. He drinks 2 glasses of wine nightly.                 Past Medical, Surgical, Family & Social History:   Reviewed and updated.    Home Medications:     Current Outpatient Medications:     amLODIPine (NORVASC) 5 MG tablet, TK 1 T PO D, Disp: , Rfl: 3    aspirin (ECOTRIN) 81 MG EC tablet, aspirin 81 mg tablet,delayed release  Take 1 tablet every day by oral route., Disp: , Rfl:     carvediloL (COREG) 3.125 MG tablet, TK 1 T PO D Strength: 3.125 mg, Disp: 180 tablet, Rfl: 2    cloNIDine (CATAPRES) 0.1 MG tablet, See Instructions, 1 tablet daily as needed if systolic blood pressure [id number], # 90 tab, 3 Refill(s), Maintenance, Pharmacy: [facility] [id number] Strength: 0.1 mg, Disp: 90 tablet, Rfl: 1    donepeziL (ARICEPT) 10 MG tablet, Take 1 tablet (10 mg total) by mouth every evening., Disp: 90 tablet, Rfl: 3    fish oil-omega-3 fatty acids 300-1,000 mg capsule, Take by mouth once daily., Disp: , Rfl:     glucosamine-chondroitin 500-400 mg tablet, Take 1 tablet by mouth 3 (three) times daily., Disp: , Rfl:     lisinopriL-hydrochlorothiazide (PRINZIDE,ZESTORETIC) 10-12.5 mg per tablet, Take 1 tablet by mouth once daily., Disp: 90 tablet, Rfl: 3    lovastatin (MEVACOR) 20 MG tablet, TK 1 T PO HS " Strength: 20 mg, Disp: 90 tablet, Rfl: 2    magnesium 200 mg Tab, Take 500 mg by mouth once., Disp: , Rfl:     meclizine (ANTIVERT) 25 mg tablet, Take 1 tablet (25 mg total) by mouth 3 (three) times daily as needed for Dizziness., Disp: 30 tablet, Rfl: 0    omega 3-dha-epa-fish oil (FISH OIL) 100-160-1,000 mg Cap,  1 cap, Oral, Daily, # 100 cap, 0 Refill(s), Disp: , Rfl:     potassium chloride (KLOR-CON) 10 MEQ TbSR, TK 2 TS PO BID Strength: 10 mEq, Disp: 360 tablet, Rfl: 1    potassium chloride (MICRO-K) 10 MEQ CpSR,  See Instructions, TAKE 2 TABLETS TWICE DAILY (DOSE CHANGE), # 360 tab, 0 Refill(s), Pharmacy: [facility], 182, cm, 04/12/21 9:23:00 CDT, Height/Length Measured, 84.7, kg, 04/12/21 9:23:00 CDT, Weight Dosing, Disp: , Rfl:     tamsulosin (FLOMAX) 0.4 mg Cap, Take 2 capsules (0.8 mg total) by mouth once daily., Disp: 180 capsule, Rfl: 3    traZODone (DESYREL) 50 MG tablet, Take 1 tablet (50 mg total) by mouth every evening., Disp: 90 tablet, Rfl: 3    Physical Examination:  /65 (BP Location: Right arm, Patient Position: Sitting, BP Method: Medium (Automatic))   Pulse 70           GENERAL:  General appearance: Well, non-toxic appearing.  No apparent distress.  Neck: supple.        MENTAL STATUS:  Alertness, attention span & concentration: normal.  Language: normal.  Orientation to self, place & time:  normal.  Memory, recent & remote: normal.  Fund of knowledge: normal.  MMSE: 25/30  27/30 (2/2023)  27/30 (1/2023 by PCP)  17/30 (10/2022 by PCP)     SPEECH:  Clear and fluent.  Follows complex commands.        CRANIAL NERVES:  Cranial Nerves II-XII were examined.  II - Visual fields: normal.  III, IV, VI: PERRL, EOMI, No ptosis, No nystagmus.  V - Facial sensation: normal.  VII - Face symmetry & mobility: normal.  VIII - Hearing: normal  IX, X - Palate: mobile & midline.  XI - Shoulder shrug: normal.  XII - Tongue protrusion: normal.           GROSS MOTOR:  Gait & station: mildly  stooped; good arm swing and step height  Tone: normal.  Abnormal movements: none.  Finger-nose: normal.  Rapid alternating movements: normal.  Pronator drift: normal        MUSCLE STRENGTH:   Hand grasp:   - right:5/5   - left:5/5     RIGHT      LEFT   5 Deltoids 5   5 Biceps 5   5 Triceps 5   5 Forearm.Pr. 5           5 Iliopsoas flex    5   5 Hip Abduct 5   5 Hip Adduct 5   5 Quads 5   5 Hams 5   5 Dorsiflex 5   5 Plantar Flex 5                    REFLEXES:     RIGHT Reflex    LEFT   2+ Biceps 2+   2+ Brachiorad. 2+           2+ Patellar 2+      SENSORY:  Light touch: Normal throughout.             Diagnostic Data Reviewed:   I have personally reviewed provider notes, labs and imaging made available to me today.     Imaging:  MRI brain 3/2023:  FINDINGS: Diffusion-weighted imaging is negative for acute ischemia or focal lesion. Gradient-echo images show no evidence of intracranial hemorrhage.     Mild cerebral atrophy with associated ventricular and sulcal enlargement. Mild periventricular and scattered deep white matter T2/FLAIR hyperintensities consistent with microangiopathic change. Cerebellar hemispheres and brainstem are unremarkable. Major intracranial T2 flow-voids are patent.     Mastoid air cells are clear. Visualized paranasal sinuses are clear.     No bone marrow signal abnormality. Pituitary gland is within normal limits.     IMPRESSION:     Negative for acute ischemia or acute intracranial pathology.     Mild white matter microangiopathic changes.     Mild cerebral atrophy.      Labs:  Lab Results   Component Value Date    WBC 4.62 10/25/2022    HGB 14.7 10/25/2022    HCT 42.3 10/25/2022     10/25/2022    MCV 98 10/25/2022    RDW 12.7 10/25/2022     Lab Results   Component Value Date     01/23/2023    K 4.0 01/23/2023     01/23/2023    CO2 27 01/23/2023    BUN 15 01/23/2023    CREATININE 1.2 01/23/2023     01/23/2023    CALCIUM 9.4 01/23/2023     Lab Results   Component  "Value Date    PROT 7.1 01/23/2023    ALBUMIN 4.1 01/23/2023    BILITOT 0.9 01/23/2023    AST 21 01/23/2023    ALKPHOS 56 01/23/2023    ALT 21 01/23/2023     No results found for: "INR", "PROTIME", "PTT"  Lab Results   Component Value Date    CHOL 169 01/23/2023    HDL 78 (H) 01/23/2023    LDLCALC 68.4 01/23/2023    TRIG 113 01/23/2023    CHOLHDL 46.2 01/23/2023     No results found for: "LABA1C", "HGBA1C"   Lab Results   Component Value Date    [id number] 319 02/27/2023     Lab Results   Component Value Date    FOLATE 16.3 02/27/2023     Lab Results   Component Value Date    TSH 1.700 10/25/2022       NP testing 5/2023:  "Mr. Mackay and his family reported an onset of cognitive change in mid to late 2022. Performance on a brief mental status screening measure improved between October 2022 and February 2023, associated with an improvement in consistency and quality of sleep and other lifestyle changes. He remains independent in activities of daily living.     Neuropsychological assessment results were interpreted in the context of estimated high average premorbid abilities and variable performance validity. Within that context, he demonstrated intact auditory attention and variable working memory. Processing speed was largely consistent with premorbid estimates. Executive functioning was variable. He demonstrated reduced auditory comprehension and naming on language testing. Visuospatial functioning was intact. Fine motor speed and dexterity were intact using his dominant hand and relatively reduced using his nondominant hand. Learning, recall, and recognition of verbal information were reduced. Learning of visual information was intact, with reduced retention and some benefit from recognition. He did not endorse significant anxiety or depression on self-report measures.     In sum, Mr. Mackay demonstrated reductions or relative reductions in aspects of working memory, executive functioning, language, fine " "motor speed, and learning and memory. Patterns of performance were outside of expectation based on performance validity and on reports from Mr. Mackay and his wife of improvement in cognitive abilities with improved quality of sleep and other lifestyle changes. He appeared dysphoric and quite frustrated with his performance during testing, which may have impacted his scores. Although patterns of performance do raise suspicion for a mild neurocognitive disorder, diagnosis is deferred given these discrepancies, and continued monitoring over time is warranted."                Assessment and Plan:        Problem List Items Addressed This Visit          Neuro    Other symptoms and signs involving cognitive functions and awareness    Current Assessment & Plan     Patient is a 82 y/o male that presents for memory f/u. Family and patent report difficulty with word finding and short term recall. Onset more so in the last 12 months.   There are no behavioral changes though he does get easily frustrated. No hallucinations, depression, recent falls. His sleep is much improved since starting Trazodone.   MMSE today 25/30; prev 27/30    - diagnosis deferred by Neuro psych but there may be suspicion for mild neurocognitive disorder   - pt noted to get frustrated during MMSE which may have skewed results   MRI brain scan noted with scattered white matter changes  Serologies noted   - rec B12 supplements   Discussed role vs expectation of cognitive enhancing mediations at length   - continue Aricept 10 mg QHS   - consider initiation of Namenda   Discussed ways to promote brain stimulation and healthy sleep routine.   There are no safety concerns                  Important to note, also  has a past medical history of Enlarged prostate, Hypertension, Kidney stone, and Urinary tract infection.          The patient will return to clinic in 6 months.    All questions were answered and patient is comfortable with the plan. "         Thank you very much for the opportunity to assist in this patient's care.    If you have any questions or concerns, please do not hesitate to contact me at any time.      Sincerely,     CARON Sinha  [facility] [place]

## 2023-08-25 NOTE — PATIENT INSTRUCTIONS
Recommendations for Mr. Mackay:  Attention: Remember that inattention and lack of focus are major culprits to forgetting information so be sure and practice paying attention for adequate learning of information. If you rely on passive attention to remembering something (e.g., yeah, uh-huh approach), youll find you cannot recall it later. I recommend the following to improve attention, which may aid in later recall:   Reduce distractions in the area as much as possible.  Look at the person as they are speaking to you.   Paraphrase as they are speaking  Write down important pieces of information   Ask them to repeat if you zone out.   Have them simplify and reduce information that you need to attend to during conversation.   Have visual cues to remind you if you need to do something later.  Processing Speed:   Using multiple modalities (e.g., listening, writing notes, asking questions, recording) to learn new information is likely to allow additional time for processing, thus improving memory for the material.   Allowing sufficient time to complete tasks will reduce frustration and help to ensure completion.  Executive Functioning:  Dont attempt to multi-task.  Separate tasks so that each can be completed one at a time.  Consider using a calendar/day planner, as that may be effective to help you plan and stay on track.  Color-coding specific tasks by importance may add additional benefit to your planner.  Break down large projects into smaller tasks and write down the steps to completing the task.  Taking notes while reading can help with recall.  Storing Information: Use the below strategies to help you further enhance how information is stored.  Rehearse - Immediately after seeing/hearing something, try to recall it.  Wait a few minutes, then check again.  Gradually lengthen the intervals between rehearsals.  Repetition of learned material is critical to ensure storage of information to be learned.  Self-test at home to ensure learning.  Write down important information to improve your attention and focus and to have something to look back on when you need to recall it.  Make sure the person doesnt rattle off, but presents in a clear, logical, and unhurried manner.   Recalling Information:  Jog your memory - Lose something?  Think back to when you last had it.  What did you do next?  And after that?  Mentally walk yourself through each activity that followed.  Prodding your memory this way may enable you to recall the location of the missing item.  Use a cue - Symbolic reminders (the proverbial string around the finger) are helpful.  So too are memos, timers, calendar notes, etc.--keep them in visible, appropriate places.  Get organized - Have fixed locations for all important papers, key phone numbers, medications, keys, wallet, glasses, tools, etc.  Develop routines - Routines can anchor memories so they do not drift away.    Sleep Hygiene: Poor sleep has a negative effect on cognition. Several strategies have been shown to improve sleep:   Caffeine intake in the afternoon and evening, as well as stuffing oneself at supper, can decrease the quality of restful sleep throughout the night.   Bedtime and wake-up times should be consistent every night and morning so the body becomes used to a single routine, even on the weekends.  Engage in daily physical activity, but not 2-3 hours before bedtime.   No technology use (television, computer, iPad) 1-2 hours before bed.   Have a wind down routine (e.g., soft lights in the house, bath before bed, reduced fluid intake, songs, reading, less noise) to promote sleep readiness.   Visit the www.sleepfoundation.org for more strategies.      Practice good cognitive/brain health hygiene:  Engage in regular exercise, which increases alertness and arousal and can improve attention and focus.    Get a good nights sleep, as this can enhance alertness and cognition.  Eat  healthy foods and balanced meals. It is notable that research indicates certain nutrients may aid in brain function, such as B vitamins (especially B6, B12, and folic acid), antioxidants (such as vitamins C and E, and beta carotene), and Omega-3 fatty acids. Talk with your physician or nutritionist about whats right for you.   Keep your brain active. Find activities to stay mentally active, such as reading, games (cards, checkers), puzzles (crosswords, Sudoku, jig saw), crafts (Goood[employer], woodworking), gardening, or participating in activities in the community.  Stay socially engaged. Continue staying active with your family and friends.     Managing Vascular Risk Factors: A personal history of disorders that affect the cardiovascular system (e.g., hypertension, hyperlipidemia, diabetes) can have a negative impact on brain functioning especially over many years. Therefore, it is very important for Mr. Mackay to maintain good control over risk factors. The following is recommended:  Take all medications as prescribed and follow-up with recommendations above.  Get regular physical exercise to the extent that it is possible.   Continue following a Mediterranean diet, which emphasizes plant-based foods, whole grains, fish and healthy fats, such as olive oil, and has been shown to be brain protective.   Check blood pressure, cholesterol levels, blood sugar, and others as appropriate.     Prepare for the future: Mr. Mackay and caregivers should consider formal arrangements to allow a designated person to make medical and financial decisions for Mr. Mackay, should he become unable to do so.  Options to consider include designating a healthcare proxy, medical and/or financial power of , and completing advanced directives for healthcare decisions and estate planning (e.g., finalizing a will).  If cost is prohibitive, [facility] [employer] (https://ls.org/) provides free  for  individuals with low income.      Recommendations: Consider stress management techniques to reduce irritability and respond to it as it arises. Heres a simple three-minute exercise you can use no matter where you are:  1. Sit or stand up nice and tall. Let your eyes relax. Relax your jaw. Let your shoulders relax down your back and start to focus your attention on your breath.  2. Breathe all the way in through your nose, filling your belly with your breath. Then, breathe all the way out through your nose. Its that simple.  3. Start to breathe into a count of three. Inhale for one, two, three. Then, exhale into a count of six: six, five, four, three, two and one.  4. Repeat. Inhale: one, two, three. Exhale: six, five, four, three, two and one.  5. Continue just like this for three minutes, or as long as you'd like. You can set a timer on your phone at the beginning of your practice.     Should your mind wander, simply notice, without judgment. Observe your thought. And let it go. Return your breath to your attention as many times as it takes, training your mind in the same way we train our bodies.     https://blog.[facility].org/articles/3-minute-mindful-breathing-exercise-for-anxiety-relief     https://blog.[facility].org/articles/shake-it-off-in-the-new-year-simple-stress-relief-techniques

## 2023-08-25 NOTE — ASSESSMENT & PLAN NOTE
Patient is a 82 y/o male that presents for memory f/u. Family and patent report difficulty with word finding and short term recall. Onset more so in the last 12 months.   There are no behavioral changes though he does get easily frustrated. No hallucinations, depression, recent falls. His sleep is much improved since starting Trazodone.   MMSE today 25/30; prev 27/30    - diagnosis deferred by Neuro psych but there may be suspicion for mild neurocognitive disorder   - pt noted to get frustrated during MMSE which may have skewed results   MRI brain scan noted with scattered white matter changes  Serologies noted   - rec B12 supplements   Discussed role vs expectation of cognitive enhancing mediations at length   - continue Aricept 10 mg QHS   - consider initiation of Namenda   Discussed ways to promote brain stimulation and healthy sleep routine.   There are no safety concerns

## 2023-08-25 NOTE — PROGRESS NOTES
Caregiver name: Santino  Relationship to the patient: Spouse  Does the patient have a living will? No  Does the patient have a designated healthcare POA? No  Does the patient have a designated general POA? Yes    Have educational materials/resources been provided? Yes      Activities of Daily Living    Bathing: Independent  Dressing: Independent  Grooming: Independent  Mouth Care: Independent  Toileting: Independent  Transferring Bed/Chair: Independent  Walking: Independent  Climbing: Independent  Eating: Independent      Instrumental Activities of Daily Living    Shopping: Independent  Cooking: Needs Help  Managing Medications: Independent  Using the phone and looking up numbers: Independent  Doing Housework: Independent  Doing Laundry: Independent  Driving or using public transportation: Cannot Do  Managing finances: Dependent    Functional Assessment Staging  4   Decreased ability to perform complex tasks, e.g. planning dinner for guests, handling personal finances (such as forgetting to pay bills), difficulty marketing, etc.*             8/25/2023    10:45 AM 4/19/2023    10:30 AM 1/25/2023    11:17 AM   Depression Patient Health Questionnaire   Over the last two weeks how often have you been bothered by little interest or pleasure in doing things Not at all Not at all Not at all   Over the last two weeks how often have you been bothered by feeling down, depressed or hopeless Not at all Not at all Not at all   PHQ-2 Total Score 0 0 0

## 2023-10-02 ENCOUNTER — PATIENT MESSAGE (OUTPATIENT)
Dept: NEUROLOGY | Facility: CLINIC | Age: 83
End: 2023-10-02
Payer: MEDICARE

## 2023-10-30 ENCOUNTER — OFFICE VISIT (OUTPATIENT)
Dept: FAMILY MEDICINE | Facility: CLINIC | Age: 83
End: 2023-10-30
Attending: FAMILY MEDICINE
Payer: MEDICARE

## 2023-10-30 VITALS
HEART RATE: 50 BPM | HEIGHT: 68 IN | WEIGHT: 162 LBS | BODY MASS INDEX: 24.55 KG/M2 | DIASTOLIC BLOOD PRESSURE: 66 MMHG | SYSTOLIC BLOOD PRESSURE: 128 MMHG

## 2023-10-30 DIAGNOSIS — F51.01 PRIMARY INSOMNIA: ICD-10-CM

## 2023-10-30 DIAGNOSIS — I10 HYPERTENSION, UNSPECIFIED TYPE: ICD-10-CM

## 2023-10-30 DIAGNOSIS — Z12.5 SPECIAL SCREENING FOR MALIGNANT NEOPLASM OF PROSTATE: ICD-10-CM

## 2023-10-30 DIAGNOSIS — R79.9 ABNORMAL FINDING OF BLOOD CHEMISTRY, UNSPECIFIED: ICD-10-CM

## 2023-10-30 DIAGNOSIS — F02.A0 MILD LATE ONSET ALZHEIMER'S DEMENTIA WITHOUT BEHAVIORAL DISTURBANCE, PSYCHOTIC DISTURBANCE, MOOD DISTURBANCE, OR ANXIETY: Primary | ICD-10-CM

## 2023-10-30 DIAGNOSIS — N40.0 BENIGN PROSTATIC HYPERPLASIA WITHOUT LOWER URINARY TRACT SYMPTOMS: ICD-10-CM

## 2023-10-30 DIAGNOSIS — G30.1 MILD LATE ONSET ALZHEIMER'S DEMENTIA WITHOUT BEHAVIORAL DISTURBANCE, PSYCHOTIC DISTURBANCE, MOOD DISTURBANCE, OR ANXIETY: Primary | ICD-10-CM

## 2023-10-30 DIAGNOSIS — I71.43 INFRARENAL ABDOMINAL AORTIC ANEURYSM (AAA) WITHOUT RUPTURE: ICD-10-CM

## 2023-10-30 DIAGNOSIS — Z12.11 SPECIAL SCREENING FOR MALIGNANT NEOPLASMS, COLON: ICD-10-CM

## 2023-10-30 DIAGNOSIS — E78.2 MIXED HYPERLIPIDEMIA: ICD-10-CM

## 2023-10-30 PROCEDURE — 3074F PR MOST RECENT SYSTOLIC BLOOD PRESSURE < 130 MM HG: ICD-10-PCS | Mod: CPTII,S$GLB,, | Performed by: FAMILY MEDICINE

## 2023-10-30 PROCEDURE — 3288F FALL RISK ASSESSMENT DOCD: CPT | Mod: CPTII,S$GLB,, | Performed by: FAMILY MEDICINE

## 2023-10-30 PROCEDURE — 99214 PR OFFICE/OUTPT VISIT, EST, LEVL IV, 30-39 MIN: ICD-10-PCS | Mod: S$GLB,,, | Performed by: FAMILY MEDICINE

## 2023-10-30 PROCEDURE — 3078F PR MOST RECENT DIASTOLIC BLOOD PRESSURE < 80 MM HG: ICD-10-PCS | Mod: CPTII,S$GLB,, | Performed by: FAMILY MEDICINE

## 2023-10-30 PROCEDURE — 3074F SYST BP LT 130 MM HG: CPT | Mod: CPTII,S$GLB,, | Performed by: FAMILY MEDICINE

## 2023-10-30 PROCEDURE — 3078F DIAST BP <80 MM HG: CPT | Mod: CPTII,S$GLB,, | Performed by: FAMILY MEDICINE

## 2023-10-30 PROCEDURE — 1101F PR PT FALLS ASSESS DOC 0-1 FALLS W/OUT INJ PAST YR: ICD-10-PCS | Mod: CPTII,S$GLB,, | Performed by: FAMILY MEDICINE

## 2023-10-30 PROCEDURE — 1159F MED LIST DOCD IN RCRD: CPT | Mod: CPTII,S$GLB,, | Performed by: FAMILY MEDICINE

## 2023-10-30 PROCEDURE — 3288F PR FALLS RISK ASSESSMENT DOCUMENTED: ICD-10-PCS | Mod: CPTII,S$GLB,, | Performed by: FAMILY MEDICINE

## 2023-10-30 PROCEDURE — 1159F PR MEDICATION LIST DOCUMENTED IN MEDICAL RECORD: ICD-10-PCS | Mod: CPTII,S$GLB,, | Performed by: FAMILY MEDICINE

## 2023-10-30 PROCEDURE — 1101F PT FALLS ASSESS-DOCD LE1/YR: CPT | Mod: CPTII,S$GLB,, | Performed by: FAMILY MEDICINE

## 2023-10-30 PROCEDURE — 99214 OFFICE O/P EST MOD 30 MIN: CPT | Mod: S$GLB,,, | Performed by: FAMILY MEDICINE

## 2023-10-30 RX ORDER — POTASSIUM CHLORIDE 750 MG/1
CAPSULE, EXTENDED RELEASE ORAL
Qty: 360 CAPSULE | Refills: 3 | Status: SHIPPED | OUTPATIENT
Start: 2023-10-30

## 2023-10-30 RX ORDER — TAMSULOSIN HYDROCHLORIDE 0.4 MG/1
2 CAPSULE ORAL DAILY
Qty: 180 CAPSULE | Refills: 3 | Status: SHIPPED | OUTPATIENT
Start: 2023-10-30

## 2023-10-30 RX ORDER — CLONIDINE HYDROCHLORIDE 0.1 MG/1
TABLET ORAL
Qty: 90 TABLET | Refills: 1 | Status: SHIPPED | OUTPATIENT
Start: 2023-10-30

## 2023-10-30 RX ORDER — CARVEDILOL 3.12 MG/1
TABLET ORAL
Qty: 180 TABLET | Refills: 3 | Status: SHIPPED | OUTPATIENT
Start: 2023-10-30

## 2023-10-30 RX ORDER — LOVASTATIN 20 MG/1
TABLET ORAL
Qty: 90 TABLET | Refills: 3 | Status: SHIPPED | OUTPATIENT
Start: 2023-10-30

## 2023-10-30 RX ORDER — LOPERAMIDE HCL 2 MG
2 TABLET ORAL
COMMUNITY

## 2023-10-31 NOTE — PROGRESS NOTES
SUBJECTIVE:    Patient ID: Nino Mackay is a 83 y.o. male.    Chief Complaint: Diarrhea (Brought bottles, pt went with [facility], stomach, abc )    83-year-old male with mild Alzheimer dementia.  His wife is with him today and gives most of the history.  He has lost 11 lb since last visit.  His diet is quite healthy eats mostly fruit and vegetables.  Rarely eats any meat, very light lunch peanut butter and jelly during the day and lactose free ice cream at night.    He takes Imodium complete and plane Imodium to control his intermittent diarrhea which happens weekly.    Alzheimer dementia, patient does not like to leave home.  He will walk the dog 5 times a day in the yd.  He likes to play Clerk and was an accomplished professional musician at 1 time.  He seems happy with his life and keeps himself interested in many things.  He does not drive a vehicle.    Status post flu vaccine and COVID vaccine at local pharmacy    His wife is good at researching medicines for Alzheimer dementia.  She is asking about Lecembi-a new experimental drug for Alzheimer's.  He does take magnesium every night for leg cramps.    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   Latest known visit with results is:   Lab Visit on 02/27/2023   Component Date Value Ref Range Status    RPR 02/27/2023 Non-reactive  Non-reactive Final    Thiamine 02/27/2023 83  38 - 122 ug/L Final    Vitamin B-12 02/27/2023 319  210 - 950 pg/mL Final    Folate 02/27/2023 16.3  4.0 - 24.0 ng/mL Final    Ammonia 02/27/2023 17  10 - 50 umol/L Final    Methlymalonic Acid 02/27/2023 0.39  <0.40 umol/L Final       Past Medical History:   Diagnosis Date    Enlarged prostate     Hypertension     Kidney stone     Urinary tract infection      Social History     Socioeconomic History    Marital status:    Tobacco Use    Smoking status: Light Smoker     Types: Cigars     Smokeless tobacco: Never    Tobacco comments:     2 cigars/day   Substance and Sexual Activity    Alcohol use: Yes     Comment: 2 glasses of wine nightly    Drug use: Never     Past Surgical History:   Procedure Laterality Date    COLONOSCOPY      EXTRACORPOREAL SHOCK WAVE LITHOTRIPSY Left 5/29/2019    Procedure: LITHOTRIPSY, ESWL;  Surgeon: Kobi Troncoso MD;  Location: [facility] OR;  Service: Urology;  Laterality: Left;    HIP SURGERY  1990    JOINT REPLACEMENT      PROSTATECTOMY  2017    TONSILLECTOMY       No family history on file.    The CVD Risk score (Veraino, et al., 2008) failed to calculate for the following reasons:    The 2008 CVD risk score is only valid for ages 30 to 74    Tests to Keep You Healthy    Last Blood Pressure <= 139/89 (10/30/2023): Yes  Tobacco Cessation: NO      Review of patient's allergies indicates:  No Known Allergies    Current Outpatient Medications:     amLODIPine (NORVASC) 5 MG tablet, TK 1 T PO D, Disp: , Rfl: 3    aspirin (ECOTRIN) 81 MG EC tablet, aspirin 81 mg tablet,delayed release  Take 1 tablet every day by oral route., Disp: , Rfl:     donepeziL (ARICEPT) 10 MG tablet, Take 1 tablet (10 mg total) by mouth every evening., Disp: 90 tablet, Rfl: 3    fish oil-omega-3 fatty acids 300-1,000 mg capsule, Take by mouth once daily., Disp: , Rfl:     glucosamine-chondroitin 500-400 mg tablet, Take 1 tablet by mouth 3 (three) times daily., Disp: , Rfl:     lisinopriL-hydrochlorothiazide (PRINZIDE,ZESTORETIC) 10-12.5 mg per tablet, Take 1 tablet by mouth once daily., Disp: 90 tablet, Rfl: 3    loperamide (IMODIUM A-D) 2 mg Tab, Take 2 mg by mouth as needed., Disp: , Rfl:     magnesium 200 mg Tab, Take 500 mg by mouth once., Disp: , Rfl:     omega 3-dha-epa-fish oil (FISH OIL) 100-160-1,000 mg Cap,  1 cap, Oral, Daily, # 100 cap, 0 Refill(s), Disp: , Rfl:     potassium chloride (KLOR-CON) 10 MEQ TbSR, TK 2 TS PO BID Strength: 10 mEq, Disp: 360 tablet, Rfl: 1    traZODone (DESYREL)  50 MG tablet, Take 1 tablet (50 mg total) by mouth every evening., Disp: 90 tablet, Rfl: 3    carvediloL (COREG) 3.125 MG tablet, TK 1 T PO D Strength: 3.125 mg, Disp: 180 tablet, Rfl: 3    cloNIDine (CATAPRES) 0.1 MG tablet, See Instructions, 1 tablet daily as needed if systolic blood pressure [id number], # 90 tab, 3 Refill(s), Maintenance, Pharmacy: [id number] Strength: 0.1 mg, Disp: 90 tablet, Rfl: 1    lovastatin (MEVACOR) 20 MG tablet, TK 1 T PO HS Strength: 20 mg, Disp: 90 tablet, Rfl: 3    meclizine (ANTIVERT) 25 mg tablet, Take 1 tablet (25 mg total) by mouth 3 (three) times daily as needed for Dizziness., Disp: 30 tablet, Rfl: 0    potassium chloride (MICRO-K) 10 MEQ CpSR, See Instructions, TAKE 2 TABLETS TWICE DAILY (DOSE CHANGE), # 360 tab, 0 Refill(s), Pharmacy: [facility], 182, cm, 04/12/21 9:23:00 CDT, Height/Length Measured, 84.7, kg, 04/12/21 9:23:00 CDT, Weight Dosing, Disp: 360 capsule, Rfl: 3    tamsulosin (FLOMAX) 0.4 mg Cap, Take 2 capsules (0.8 mg total) by mouth once daily., Disp: 180 capsule, Rfl: 3    Review of Systems   Constitutional:  Negative for appetite change, chills, fatigue, fever and unexpected weight change.   HENT:  Negative for ear pain and trouble swallowing.    Eyes:  Negative for pain, discharge and visual disturbance.   Respiratory:  Negative for apnea, cough, shortness of breath and wheezing.    Cardiovascular:  Negative for chest pain and leg swelling.   Gastrointestinal:  Positive for diarrhea (intermittently). Negative for abdominal pain, blood in stool, constipation, nausea, vomiting and reflux.   Endocrine: Negative for cold intolerance, heat intolerance and polydipsia.   Genitourinary:  Negative for bladder incontinence, dysuria, erectile dysfunction, frequency, hematuria, testicular pain and urgency.   Musculoskeletal:  Negative for gait problem, joint swelling and myalgias.   Neurological:  Negative for dizziness, seizures and  "numbness.   Psychiatric/Behavioral:  Negative for agitation, behavioral problems, hallucinations and sleep disturbance. The patient is not nervous/anxious.            Objective:      Vitals:    10/30/23 1040   BP: 128/66   Pulse: (!) 50   Weight: 73.5 kg (162 lb)   Height: 5' 8" (1.727 m)     Physical Exam  Vitals and nursing note reviewed.   Constitutional:       General: He is not in acute distress.     Appearance: Normal appearance. He is well-developed. He is not toxic-appearing.   HENT:      Head: Normocephalic and atraumatic.      Right Ear: Tympanic membrane and external ear normal.      Left Ear: Tympanic membrane and external ear normal.      Nose: Nose normal.      Mouth/Throat:      Pharynx: Oropharynx is clear.   Eyes:      Pupils: Pupils are equal, round, and reactive to light.   Neck:      Thyroid: No thyromegaly.      Vascular: No carotid bruit.   Cardiovascular:      Rate and Rhythm: Normal rate and regular rhythm.      Heart sounds: Normal heart sounds. No murmur heard.  Pulmonary:      Effort: Pulmonary effort is normal.      Breath sounds: Normal breath sounds. No wheezing or rales.   Abdominal:      General: Bowel sounds are normal. There is no distension.      Palpations: Abdomen is soft.      Tenderness: There is no abdominal tenderness.   Musculoskeletal:         General: No tenderness or deformity. Normal range of motion.      Cervical back: Normal range of motion and neck supple.      Lumbar back: Normal. No spasms.      Comments: Bends 90 degrees at  waist, shoulders and knees good range of motion without crepitance.  No pitting edema to lower extremities   Lymphadenopathy:      Cervical: No cervical adenopathy.   Skin:     General: Skin is warm and dry.      Findings: No rash.   Neurological:      General: No focal deficit present.      Mental Status: He is alert and oriented to person, place, and time. Mental status is at baseline.      Cranial Nerves: No cranial nerve deficit.      " Coordination: Coordination normal.      Comments: Poor judgment and poor short-term memory.   Psychiatric:         Mood and Affect: Mood normal.         Behavior: Behavior normal.         Thought Content: Thought content normal.         Judgment: Judgment normal.           Assessment:       1. Mild late onset Alzheimer's dementia without behavioral disturbance, psychotic disturbance, mood disturbance, or anxiety    2. Benign prostatic hyperplasia without lower urinary tract symptoms    3. Mixed hyperlipidemia    4. Hypertension, unspecified type    5. Special screening for malignant neoplasms, colon    6. Special screening for malignant neoplasm of prostate    7. Abnormal finding of blood chemistry, unspecified    8. Infrarenal abdominal aortic aneurysm (AAA) without rupture    9. Primary insomnia         Plan:       Mild late onset Alzheimer's dementia without behavioral disturbance, psychotic disturbance, mood disturbance, or anxiety  -     CBC Auto Differential; Future; Expected date: 10/30/2023  -     Comprehensive Metabolic Panel; Future; Expected date: 10/30/2023  -     Lipid Panel; Future; Expected date: 04/01/2024  -     Urinalysis, Reflex to Urine Culture Urine, Clean Catch; Future; Expected date: 10/30/2023  -     PSA, Screening; Future; Expected date: 10/30/2023  Patient is rather comfortable and enjoying his life.  Discussed adding memantine 5 mg b.i.d. if we see a neurological decline.  Baseline lab work ordered  Benign prostatic hyperplasia without lower urinary tract symptoms  -     tamsulosin (FLOMAX) 0.4 mg Cap; Take 2 capsules (0.8 mg total) by mouth once daily.  Dispense: 180 capsule; Refill: 3    Mixed hyperlipidemia  -     lovastatin (MEVACOR) 20 MG tablet; TK 1 T PO HS Strength: 20 mg  Dispense: 90 tablet; Refill: 3    Hypertension, unspecified type  -     cloNIDine (CATAPRES) 0.1 MG tablet; See Instructions, 1 tablet daily as needed if systolic blood pressure [id number], # 90 tab, 3 Refill(s),  Maintenance, Pharmacy: [facility] [id number] Strength: 0.1 mg  Dispense: 90 tablet; Refill: 1  -     carvediloL (COREG) 3.125 MG tablet; TK 1 T PO D Strength: 3.125 mg  Dispense: 180 tablet; Refill: 3    Special screening for malignant neoplasms, colon    Special screening for malignant neoplasm of prostate  -     PSA, Screening; Future; Expected date: 10/30/2023    Abnormal finding of blood chemistry, unspecified  -     Lipid Panel; Future; Expected date: 04/01/2024    Infrarenal abdominal aortic aneurysm (AAA) without rupture    Primary insomnia    Other orders  -     potassium chloride (MICRO-K) 10 MEQ CpSR; See Instructions, TAKE 2 TABLETS TWICE DAILY (DOSE CHANGE), # 360 tab, 0 Refill(s), Pharmacy: [facility], 182, cm, 04/12/21 9:23:00 CDT, Height/Length Measured, 84.7, kg, 04/12/21 9:23:00 CDT, Weight Dosing  Dispense: 360 capsule; Refill: 3      Follow up in about 6 months (around 4/30/2024), or Alzheimers.        10/30/2023 Yonas Alaniz

## 2023-12-12 ENCOUNTER — PATIENT MESSAGE (OUTPATIENT)
Dept: FAMILY MEDICINE | Facility: CLINIC | Age: 83
End: 2023-12-12

## 2024-01-05 ENCOUNTER — PATIENT MESSAGE (OUTPATIENT)
Dept: FAMILY MEDICINE | Facility: CLINIC | Age: 84
End: 2024-01-05
Payer: MEDICARE

## 2024-01-05 DIAGNOSIS — F51.01 PRIMARY INSOMNIA: ICD-10-CM

## 2024-01-05 RX ORDER — TRAZODONE HYDROCHLORIDE 50 MG/1
50 TABLET ORAL NIGHTLY
Qty: 90 TABLET | Refills: 0 | Status: SHIPPED | OUTPATIENT
Start: 2024-01-05 | End: 2025-01-04

## 2024-01-08 ENCOUNTER — PATIENT MESSAGE (OUTPATIENT)
Dept: FAMILY MEDICINE | Facility: CLINIC | Age: 84
End: 2024-01-08
Payer: MEDICARE

## 2024-01-28 ENCOUNTER — PATIENT MESSAGE (OUTPATIENT)
Dept: FAMILY MEDICINE | Facility: CLINIC | Age: 84
End: 2024-01-28
Payer: MEDICARE

## 2024-02-26 ENCOUNTER — OFFICE VISIT (OUTPATIENT)
Dept: NEUROLOGY | Facility: CLINIC | Age: 84
End: 2024-02-26
Payer: MEDICARE

## 2024-02-26 VITALS
RESPIRATION RATE: 18 BRPM | WEIGHT: 154.75 LBS | HEIGHT: 71 IN | HEART RATE: 52 BPM | BODY MASS INDEX: 21.66 KG/M2 | SYSTOLIC BLOOD PRESSURE: 150 MMHG | DIASTOLIC BLOOD PRESSURE: 72 MMHG

## 2024-02-26 DIAGNOSIS — R41.3 MEMORY LOSS: ICD-10-CM

## 2024-02-26 DIAGNOSIS — R41.89 OTHER SYMPTOMS AND SIGNS INVOLVING COGNITIVE FUNCTIONS AND AWARENESS: Primary | ICD-10-CM

## 2024-02-26 PROBLEM — G30.1 MILD LATE ONSET ALZHEIMER'S DEMENTIA: Status: RESOLVED | Noted: 2023-04-19 | Resolved: 2024-02-26

## 2024-02-26 PROBLEM — F02.A0 MILD LATE ONSET ALZHEIMER'S DEMENTIA: Status: RESOLVED | Noted: 2023-04-19 | Resolved: 2024-02-26

## 2024-02-26 PROCEDURE — 99999 PR PBB SHADOW E&M-EST. PATIENT-LVL V: CPT | Mod: PBBFAC,HCNC,, | Performed by: NURSE PRACTITIONER

## 2024-02-26 PROCEDURE — 99499 UNLISTED E&M SERVICE: CPT | Mod: HCNC,S$GLB,, | Performed by: NURSE PRACTITIONER

## 2024-02-26 PROCEDURE — 99483 ASSMT & CARE PLN PT COG IMP: CPT | Mod: HCNC,S$GLB,, | Performed by: NURSE PRACTITIONER

## 2024-02-26 RX ORDER — SIMETHICONE 125 MG
125 TABLET,CHEWABLE ORAL EVERY 6 HOURS PRN
COMMUNITY

## 2024-02-26 RX ORDER — DONEPEZIL HYDROCHLORIDE 5 MG/1
5 TABLET, FILM COATED ORAL NIGHTLY
Qty: 90 TABLET | Refills: 3 | Status: SHIPPED | OUTPATIENT
Start: 2024-02-26 | End: 2025-02-25

## 2024-02-26 NOTE — PATIENT INSTRUCTIONS
Recommendations for Mr. Mackay:  Attention: Remember that inattention and lack of focus are major culprits to forgetting information so be sure and practice paying attention for adequate learning of information. If you rely on passive attention to remembering something (e.g., yeah, uh-huh approach), youll find you cannot recall it later. I recommend the following to improve attention, which may aid in later recall:   Reduce distractions in the area as much as possible.  Look at the person as they are speaking to you.   Paraphrase as they are speaking  Write down important pieces of information   Ask them to repeat if you zone out.   Have them simplify and reduce information that you need to attend to during conversation.   Have visual cues to remind you if you need to do something later.  Processing Speed:   Using multiple modalities (e.g., listening, writing notes, asking questions, recording) to learn new information is likely to allow additional time for processing, thus improving memory for the material.   Allowing sufficient time to complete tasks will reduce frustration and help to ensure completion.  Executive Functioning:  Dont attempt to multi-task.  Separate tasks so that each can be completed one at a time.  Consider using a calendar/day planner, as that may be effective to help you plan and stay on track.  Color-coding specific tasks by importance may add additional benefit to your planner.  Break down large projects into smaller tasks and write down the steps to completing the task.  Taking notes while reading can help with recall.  Storing Information: Use the below strategies to help you further enhance how information is stored.  Rehearse - Immediately after seeing/hearing something, try to recall it.  Wait a few minutes, then check again.  Gradually lengthen the intervals between rehearsals.  Repetition of learned material is critical to ensure storage of information to be learned.  Self-test at home to ensure learning.  Write down important information to improve your attention and focus and to have something to look back on when you need to recall it.  Make sure the person doesnt rattle off, but presents in a clear, logical, and unhurried manner.   Recalling Information:  Jog your memory - Lose something?  Think back to when you last had it.  What did you do next?  And after that?  Mentally walk yourself through each activity that followed.  Prodding your memory this way may enable you to recall the location of the missing item.  Use a cue - Symbolic reminders (the proverbial string around the finger) are helpful.  So too are memos, timers, calendar notes, etc.--keep them in visible, appropriate places.  Get organized - Have fixed locations for all important papers, key phone numbers, medications, keys, wallet, glasses, tools, etc.  Develop routines - Routines can anchor memories so they do not drift away.    Sleep Hygiene: Poor sleep has a negative effect on cognition. Several strategies have been shown to improve sleep:   Caffeine intake in the afternoon and evening, as well as stuffing oneself at supper, can decrease the quality of restful sleep throughout the night.   Bedtime and wake-up times should be consistent every night and morning so the body becomes used to a single routine, even on the weekends.  Engage in daily physical activity, but not 2-3 hours before bedtime.   No technology use (television, computer, iPad) 1-2 hours before bed.   Have a wind down routine (e.g., soft lights in the house, bath before bed, reduced fluid intake, songs, reading, less noise) to promote sleep readiness.   Visit the www.sleepfoundation.org for more strategies.      Practice good cognitive/brain health hygiene:  Engage in regular exercise, which increases alertness and arousal and can improve attention and focus.    Get a good nights sleep, as this can enhance alertness and cognition.  Eat  healthy foods and balanced meals. It is notable that research indicates certain nutrients may aid in brain function, such as B vitamins (especially B6, B12, and folic acid), antioxidants (such as vitamins C and E, and beta carotene), and Omega-3 fatty acids. Talk with your physician or nutritionist about whats right for you.   Keep your brain active. Find activities to stay mentally active, such as reading, games (cards, checkers), puzzles (crosswords, Sudoku, jig saw), crafts (We[employer], woodworking), gardening, or participating in activities in the community.  Stay socially engaged. Continue staying active with your family and friends.     Managing Vascular Risk Factors: A personal history of disorders that affect the cardiovascular system (e.g., hypertension, hyperlipidemia, diabetes) can have a negative impact on brain functioning especially over many years. Therefore, it is very important for Mr. Mackay to maintain good control over risk factors. The following is recommended:  Take all medications as prescribed and follow-up with recommendations above.  Get regular physical exercise to the extent that it is possible.   Continue following a Mediterranean diet, which emphasizes plant-based foods, whole grains, fish and healthy fats, such as olive oil, and has been shown to be brain protective.   Check blood pressure, cholesterol levels, blood sugar, and others as appropriate.     Prepare for the future: Mr. Mackay and caregivers should consider formal arrangements to allow a designated person to make medical and financial decisions for Mr. Mackay, should he become unable to do so.  Options to consider include designating a healthcare proxy, medical and/or financial power of , and completing advanced directives for healthcare decisions and estate planning (e.g., finalizing a will).  If cost is prohibitive, [facility] [employer] (https://ls.org/) provides free  for  individuals with low income.      Recommendations: Consider stress management techniques to reduce irritability and respond to it as it arises. Heres a simple three-minute exercise you can use no matter where you are:  1. Sit or stand up nice and tall. Let your eyes relax. Relax your jaw. Let your shoulders relax down your back and start to focus your attention on your breath.  2. Breathe all the way in through your nose, filling your belly with your breath. Then, breathe all the way out through your nose. Its that simple.  3. Start to breathe into a count of three. Inhale for one, two, three. Then, exhale into a count of six: six, five, four, three, two and one.  4. Repeat. Inhale: one, two, three. Exhale: six, five, four, three, two and one.  5. Continue just like this for three minutes, or as long as you'd like. You can set a timer on your phone at the beginning of your practice.     Should your mind wander, simply notice, without judgment. Observe your thought. And let it go. Return your breath to your attention as many times as it takes, training your mind in the same way we train our bodies.     https://blog.[facility].org/articles/3-minute-mindful-breathing-exercise-for-anxiety-relief     https://blog.[facility].org/articles/shake-it-off-in-the-new-year-simple-stress-relief-techniques

## 2024-02-26 NOTE — ASSESSMENT & PLAN NOTE
Patient is a 83 y/o male that presents for memory f/u. Family and patent report difficulty with word finding and short term recall.   There are no behavioral changes though he does get easily frustrated or irritable at times. No hallucinations, depression. His sleep is much improved since starting Trazodone.   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 Suspect this has progressed since testing in May 2023   - pt does get frustrated during MMSE  MRI brain scan noted with scattered white matter changes  Serologies noted   - rec B12 supplements   Discussed role vs expectation of cognitive enhancing mediations at length   - there is suspicion that the increase in Aricept last year has caused diarrhea. Decrease dose back down to 5 mg QHS. If improvement then continue at that dose. If no improvement then consider medication holiday and alternative altogether.    - consider initiation of Namenda in future  Discussed ways to promote brain stimulation and healthy sleep routine.   There are no safety concerns

## 2024-02-26 NOTE — PROGRESS NOTES
"  NEUROLOGY  Outpatient Follow Up    [facility]  [address] [facility], [place] [address]  [phone] (office) / [phone] (fax)    Patient Name:  Nino Mackay  :  1940  MR #:  [id number]  Acct #:  [id number]    Date of Neurology Visit: 2024  Name of Provider: CARON Sinha    Other Physicians:  Yonas Alaniz MD (Primary Care Physician); No ref. provider found (Referring)      Chief Complaint: Memory Loss      History of Present Illness (HPI):    Nino Mackay is a left handed 83 y.o. male with a PMHX of HTN, Kidney stones, UTI  Patient is here today for memory loss. He is accompanied by his spouse and daughter who help supply information. He states he does forget certain words & names at times.      Patient's highest level of education completed was college. He was a musician and artist. The onset of memory issues likely began in the last 6-8 months. He struggles more with short term memory loss. Spouse states he has increased frustration and is short tempered at times. He denies depression and hallucinations. He is now taking Trazodone which does offer aid. Previously he was having nightmares, even as a youth. Trazodone does help with this. He does write things down to assist with memory.  Spouse is managing the finances and has always done so. He is managing is own medications. Every so often he may forget if he has taken a pill or not. There are no issues with hygiene and able to perform ADLs without assistance. He does have word finding difficulty. He also does have urinary frequency. He denies recent falls. He is no longer driving due to ongoing vertigo. He likes to read and plays music.  He was placed on Aricept about 5-6 months ago by his PCP for memory loss and has tolerated it well.         Interval Hx 2023:   Patient presents today or memory follow up. He is accompanied by his spouse. Overall patient states he is doing "fine". He " states his memory is not as good as it use to be. He uses a book to recall names and times. He continues to paint for brain stimulation. He continues to have some frustration towards spouse or may be irritable at times. He denies depression and hallucinations. He continues to take Trazodone for sleep which offers aid. There is no reported RBD. Spouse is managing the finances and has always done so. He is managing is own medications. There are no issues with hygiene and able to perform ADLs without assistance. He does have word finding difficulty. He also does have urinary frequency. He denies recent falls. He is no longer driving due to ongoing vertigo. He is eating well and following a mediterranean diet. He drinks 2 glasses of wine nightly.       Interval Hx 2/26/2024:  Patient presents today or memory follow up. He is accompanied by his spouse and daughter. Overall he believes his stable. Family believes he is stable as well but depends on the day. He sticks with a daily routine. Spouse continues to report irritability, especially in the evenings. He sleeps well at night. There is no reported RBD. He does take Trazodone for sleep as well as drinking 2 glasses of wine. About 3 months ago he got up to use the restroom and fell. He denies depression and hallucinations. Spouse is managing the finances and has always done so. He is managing is own medications but will need reminding. He is able to perform ADLs without assistance.  His spouse notices more confusion when out of his environment. He is no longer driving. He was started on Aricept in 2022 and did well. Spouse reports diarrhea in the last 6-12 mths. Aricept 10 mg was increased in Feb 2023.   Spouse states he has not played music like he use to. This is concerning to her. He states he doesn't want to do play anymore but likes doing other activities like painting.     Concerns below from family:      Past Medical, Surgical, Family & Social History:   Reviewed  and updated.    Home Medications:     Current Outpatient Medications:     amLODIPine (NORVASC) 5 MG tablet, TK 1 T PO D, Disp: , Rfl: 3    carvediloL (COREG) 3.125 MG tablet, TK 1 T PO D Strength: 3.125 mg, Disp: 180 tablet, Rfl: 3    cloNIDine (CATAPRES) 0.1 MG tablet, See Instructions, 1 tablet daily as needed if systolic blood pressure [id number], # 90 tab, 3 Refill(s), Maintenance, Pharmacy: [facility] [id number] Strength: 0.1 mg, Disp: 90 tablet, Rfl: 1    fish oil-omega-3 fatty acids 300-1,000 mg capsule, Take by mouth once daily., Disp: , Rfl:     glucosamine-chondroitin 500-400 mg tablet, Take 1 tablet by mouth 3 (three) times daily., Disp: , Rfl:     Lactobacillus rhamnosus GG (CULTURELLE) 10 billion cell capsule, Take 1 capsule by mouth once daily., Disp: , Rfl:     lisinopriL-hydrochlorothiazide (PRINZIDE,ZESTORETIC) 10-12.5 mg per tablet, Take 1 tablet by mouth once daily., Disp: 90 tablet, Rfl: 3    loperamide (IMODIUM A-D) 2 mg Tab, Take 2 mg by mouth as needed., Disp: , Rfl:     lovastatin (MEVACOR) 20 MG tablet, TK 1 T PO HS Strength: 20 mg, Disp: 90 tablet, Rfl: 3    magnesium 200 mg Tab, Take 500 mg by mouth once., Disp: , Rfl:     potassium chloride (MICRO-K) 10 MEQ CpSR, See Instructions, TAKE 2 TABLETS TWICE DAILY (DOSE CHANGE), # 360 tab, 0 Refill(s), Pharmacy: [facility], 182, cm, 04/12/21 9:23:00 CDT, Height/Length Measured, 84.7, kg, 04/12/21 9:23:00 CDT, Weight Dosing, Disp: 360 capsule, Rfl: 3    simethicone (MYLICON) 125 MG chewable tablet, Take 125 mg by mouth every 6 (six) hours as needed for Flatulence., Disp: , Rfl:     traZODone (DESYREL) 50 MG tablet, Take 1 tablet (50 mg total) by mouth every evening., Disp: 90 tablet, Rfl: 0    aspirin (ECOTRIN) 81 MG EC tablet, aspirin 81 mg tablet,delayed release  Take 1 tablet every day by oral route., Disp: , Rfl:     donepeziL (ARICEPT) 5 MG tablet, Take 1 tablet (5 mg total) by mouth every evening., Disp: 90 tablet,  "Rfl: 3    meclizine (ANTIVERT) 25 mg tablet, Take 1 tablet (25 mg total) by mouth 3 (three) times daily as needed for Dizziness., Disp: 30 tablet, Rfl: 0    omega 3-dha-epa-fish oil (FISH OIL) 100-160-1,000 mg Cap,  1 cap, Oral, Daily, # 100 cap, 0 Refill(s), Disp: , Rfl:     potassium chloride (KLOR-CON) 10 MEQ TbSR, TK 2 TS PO BID Strength: 10 mEq (Patient not taking: Reported on 2/26/2024), Disp: 360 tablet, Rfl: 1    tamsulosin (FLOMAX) 0.4 mg Cap, Take 2 capsules (0.8 mg total) by mouth once daily. (Patient not taking: Reported on 2/26/2024), Disp: 180 capsule, Rfl: 3    Physical Examination:  BP (!) 150/72 (BP Location: Left arm, Patient Position: Sitting, BP Method: Medium (Automatic))   Pulse (!) 52   Resp 18   Ht 5' 11" (1.803 m)   Wt 70.2 kg (154 lb 12.2 oz)   BMI 21.59 kg/m²                     GENERAL:  General appearance: Well, non-toxic appearing.  No apparent distress.  Neck: supple.        MENTAL STATUS:  Alertness, attention span & concentration: normal.  Language: normal.  Orientation to self, place & time:  normal.  Memory, recent & remote: normal.  Fund of knowledge: normal.  MMSE: 22/30  25/30 (8/2023)  27/30 (2/2023)  27/30 (1/2023 by PCP)  17/30 (10/2022 by PCP)     SPEECH:  Clear and fluent.  Follows complex commands.        CRANIAL NERVES:  Cranial Nerves II-XII were examined.  II - Visual fields: normal.  III, IV, VI: PERRL, EOMI, No ptosis, No nystagmus.  V - Facial sensation: normal.  VII - Face symmetry & mobility: normal.  VIII - Hearing: normal  IX, X - Palate: mobile & midline.  XI - Shoulder shrug: normal.  XII - Tongue protrusion: normal.           GROSS MOTOR:  Gait & station: mildly stooped; good arm swing and step height  Tone: normal.  Abnormal movements: none.  Finger-nose: normal.  Rapid alternating movements: normal.  Pronator drift: normal        MUSCLE STRENGTH:   Hand grasp:   - right:5/5   - left:5/5     RIGHT      LEFT   5 Deltoids 5   5 Biceps 5   5 Triceps 5   5 " "Forearm.Pr. 5           5 Iliopsoas flex    5   5 Hip Abduct 5   5 Hip Adduct 5   5 Quads 5   5 Hams 5   5 Dorsiflex 5   5 Plantar Flex 5                    REFLEXES:     RIGHT Reflex    LEFT   2+ Biceps 2+   2+ Brachiorad. 2+           2+ Patellar 2+      SENSORY:  Light touch: Normal throughout.             Diagnostic Data Reviewed:   I have personally reviewed provider notes, labs and imaging made available to me today.     Imaging:  MRI brain 3/2023:  FINDINGS: Diffusion-weighted imaging is negative for acute ischemia or focal lesion. Gradient-echo images show no evidence of intracranial hemorrhage.     Mild cerebral atrophy with associated ventricular and sulcal enlargement. Mild periventricular and scattered deep white matter T2/FLAIR hyperintensities consistent with microangiopathic change. Cerebellar hemispheres and brainstem are unremarkable. Major intracranial T2 flow-voids are patent.     Mastoid air cells are clear. Visualized paranasal sinuses are clear.     No bone marrow signal abnormality. Pituitary gland is within normal limits.     IMPRESSION:     Negative for acute ischemia or acute intracranial pathology.     Mild white matter microangiopathic changes.     Mild cerebral atrophy.      Labs:  Lab Results   Component Value Date    WBC 4.62 10/25/2022    HGB 14.7 10/25/2022    HCT 42.3 10/25/2022     10/25/2022    MCV 98 10/25/2022    RDW 12.7 10/25/2022     Lab Results   Component Value Date     01/23/2023    K 4.0 01/23/2023     01/23/2023    CO2 27 01/23/2023    BUN 15 01/23/2023    CREATININE 1.2 01/23/2023     01/23/2023    CALCIUM 9.4 01/23/2023     Lab Results   Component Value Date    PROT 7.1 01/23/2023    ALBUMIN 4.1 01/23/2023    BILITOT 0.9 01/23/2023    AST 21 01/23/2023    ALKPHOS 56 01/23/2023    ALT 21 01/23/2023     No results found for: "INR", "PROTIME", "PTT"  Lab Results   Component Value Date    CHOL 169 01/23/2023    HDL 78 (H) 01/23/2023    LDLCALC " "68.4 01/23/2023    TRIG 113 01/23/2023    CHOLHDL 46.2 01/23/2023     No results found for: "LABA1C", "HGBA1C"   Lab Results   Component Value Date    [id number] 319 02/27/2023     Lab Results   Component Value Date    FOLATE 16.3 02/27/2023     Lab Results   Component Value Date    TSH 1.700 10/25/2022       NP testing 5/2023:  "Mr. Mackay and his family reported an onset of cognitive change in mid to late 2022. Performance on a brief mental status screening measure improved between October 2022 and February 2023, associated with an improvement in consistency and quality of sleep and other lifestyle changes. He remains independent in activities of daily living.     Neuropsychological assessment results were interpreted in the context of estimated high average premorbid abilities and variable performance validity. Within that context, he demonstrated intact auditory attention and variable working memory. Processing speed was largely consistent with premorbid estimates. Executive functioning was variable. He demonstrated reduced auditory comprehension and naming on language testing. Visuospatial functioning was intact. Fine motor speed and dexterity were intact using his dominant hand and relatively reduced using his nondominant hand. Learning, recall, and recognition of verbal information were reduced. Learning of visual information was intact, with reduced retention and some benefit from recognition. He did not endorse significant anxiety or depression on self-report measures.     In sum, Mr. Mackay demonstrated reductions or relative reductions in aspects of working memory, executive functioning, language, fine motor speed, and learning and memory. Patterns of performance were outside of expectation based on performance validity and on reports from Mr. Mackay and his wife of improvement in cognitive abilities with improved quality of sleep and other lifestyle changes. He appeared dysphoric and quite " "frustrated with his performance during testing, which may have impacted his scores. Although patterns of performance do raise suspicion for a mild neurocognitive disorder, diagnosis is deferred given these discrepancies, and continued monitoring over time is warranted."                Assessment and Plan:    Problem List Items Addressed This Visit          Neuro    Other symptoms and signs involving cognitive functions and awareness - Primary    Current Assessment & Plan     Patient is a 83 y/o male that presents for memory f/u. Family and patent report difficulty with word finding and short term recall.   There are no behavioral changes though he does get easily frustrated or irritable at times. No hallucinations, depression. His sleep is much improved since starting Trazodone.   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 Suspect this has progressed since testing in May 2023   - pt does get frustrated during MMSE  MRI brain scan noted with scattered white matter changes  Serologies noted   - rec B12 supplements   Discussed role vs expectation of cognitive enhancing mediations at length   - there is suspicion that the increase in Aricept last year has caused diarrhea. Decrease dose back down to 5 mg QHS. If improvement then continue at that dose. If no improvement then consider medication holiday and alternative altogether.    - consider initiation of Namenda in future  Discussed ways to promote brain stimulation and healthy sleep routine.   There are no safety concerns         RESOLVED: Memory loss         Important to note, also  has a past medical history of Enlarged prostate, Hypertension, Kidney stone, and Urinary tract infection.          The patient will return to clinic in 6 months.    All questions were answered and patient is comfortable with the plan.         Thank you very much for the opportunity to assist in this " patient's care.    If you have any questions or concerns, please do not hesitate to contact me at any time.      Sincerely,     CARON Sinha  [facility] [place]

## 2024-02-26 NOTE — PROGRESS NOTES
Caregiver name: Santino  Relationship to the patient: wife  Does the patient have a living will? No  Does the patient have a designated healthcare POA? No  Does the patient have a designated general POA? No    Have educational materials/resources been provided? Yes      Activities of Daily Living    Bathing: Independent  Dressing: Independent  Grooming: Independent  Mouth Care: Independent  Toileting: Independent  Transferring Bed/Chair: Independent  Walking: Independent  Climbing: Independent  Eating: Independent      Instrumental Activities of Daily Living    Shopping: Independent  Cooking: Independent  Managing Medications: Needs Help  Using the phone and looking up numbers: Independent  Doing Housework: Independent  Doing Laundry: Independent  Driving or using public transportation: Dependent  Managing finances: Needs Help    Functional Assessment Staging  4   Decreased ability to perform complex tasks, e.g. planning dinner for guests, handling personal finances (such as forgetting to pay bills), difficulty marketing, etc.*             10/30/2023    10:45 AM 8/25/2023    10:45 AM 4/19/2023    10:30 AM 1/25/2023    11:17 AM   Depression Patient Health Questionnaire   Over the last two weeks how often have you been bothered by little interest or pleasure in doing things Not at all Not at all Not at all Not at all   Over the last two weeks how often have you been bothered by feeling down, depressed or hopeless Not at all Not at all Not at all Not at all   PHQ-2 Total Score 0 0 0 0

## 2024-02-27 DIAGNOSIS — Z00.00 ENCOUNTER FOR MEDICARE ANNUAL WELLNESS EXAM: ICD-10-CM

## 2024-03-04 ENCOUNTER — PATIENT MESSAGE (OUTPATIENT)
Dept: FAMILY MEDICINE | Facility: CLINIC | Age: 84
End: 2024-03-04
Payer: MEDICARE

## 2024-03-07 ENCOUNTER — PATIENT MESSAGE (OUTPATIENT)
Dept: FAMILY MEDICINE | Facility: CLINIC | Age: 84
End: 2024-03-07
Payer: MEDICARE

## 2024-03-07 DIAGNOSIS — F51.01 PRIMARY INSOMNIA: ICD-10-CM

## 2024-03-07 RX ORDER — TRAZODONE HYDROCHLORIDE 50 MG/1
50 TABLET ORAL NIGHTLY
Qty: 90 TABLET | Refills: 0 | Status: SHIPPED | OUTPATIENT
Start: 2024-03-07 | End: 2025-03-07

## 2024-04-01 ENCOUNTER — PATIENT MESSAGE (OUTPATIENT)
Dept: FAMILY MEDICINE | Facility: CLINIC | Age: 84
End: 2024-04-01
Payer: MEDICARE

## 2024-04-25 ENCOUNTER — TELEPHONE (OUTPATIENT)
Dept: FAMILY MEDICINE | Facility: CLINIC | Age: 84
End: 2024-04-25
Payer: MEDICARE

## 2024-04-25 ENCOUNTER — PATIENT MESSAGE (OUTPATIENT)
Dept: FAMILY MEDICINE | Facility: CLINIC | Age: 84
End: 2024-04-25
Payer: MEDICARE

## 2024-04-25 DIAGNOSIS — N40.0 BENIGN PROSTATIC HYPERPLASIA WITHOUT LOWER URINARY TRACT SYMPTOMS: ICD-10-CM

## 2024-04-25 DIAGNOSIS — G30.1 MILD LATE ONSET ALZHEIMER'S DEMENTIA WITHOUT BEHAVIORAL DISTURBANCE, PSYCHOTIC DISTURBANCE, MOOD DISTURBANCE, OR ANXIETY: ICD-10-CM

## 2024-04-25 DIAGNOSIS — Z12.5 SPECIAL SCREENING FOR MALIGNANT NEOPLASM OF PROSTATE: ICD-10-CM

## 2024-04-25 DIAGNOSIS — E78.2 MIXED HYPERLIPIDEMIA: ICD-10-CM

## 2024-04-25 DIAGNOSIS — Z79.899 ENCOUNTER FOR LONG-TERM (CURRENT) USE OF OTHER MEDICATIONS: Primary | ICD-10-CM

## 2024-04-25 DIAGNOSIS — I10 HYPERTENSION, UNSPECIFIED TYPE: ICD-10-CM

## 2024-04-25 DIAGNOSIS — F02.A0 MILD LATE ONSET ALZHEIMER'S DEMENTIA WITHOUT BEHAVIORAL DISTURBANCE, PSYCHOTIC DISTURBANCE, MOOD DISTURBANCE, OR ANXIETY: ICD-10-CM

## 2024-04-26 ENCOUNTER — PATIENT MESSAGE (OUTPATIENT)
Dept: FAMILY MEDICINE | Facility: CLINIC | Age: 84
End: 2024-04-26
Payer: MEDICARE

## 2024-04-26 RX ORDER — POTASSIUM CHLORIDE 750 MG/1
CAPSULE, EXTENDED RELEASE ORAL
Qty: 360 CAPSULE | Refills: 3 | Status: SHIPPED | OUTPATIENT
Start: 2024-04-26

## 2024-05-01 LAB
ALBUMIN SERPL-MCNC: 4.3 G/DL (ref 3.6–5.1)
ALBUMIN/GLOB SERPL: 2.2 (CALC) (ref 1–2.5)
ALP SERPL-CCNC: 70 U/L (ref 35–144)
ALT SERPL-CCNC: 16 U/L (ref 9–46)
AST SERPL-CCNC: 22 U/L (ref 10–35)
BASOPHILS # BLD AUTO: 39 CELLS/UL (ref 0–200)
BASOPHILS NFR BLD AUTO: 0.9 %
BILIRUB SERPL-MCNC: 1.2 MG/DL (ref 0.2–1.2)
BUN SERPL-MCNC: 18 MG/DL (ref 7–25)
BUN/CREAT SERPL: NORMAL (CALC) (ref 6–22)
CALCIUM SERPL-MCNC: 9.2 MG/DL (ref 8.6–10.3)
CHLORIDE SERPL-SCNC: 105 MMOL/L (ref 98–110)
CHOLEST SERPL-MCNC: 155 MG/DL
CHOLEST/HDLC SERPL: 1.8 (CALC)
CO2 SERPL-SCNC: 31 MMOL/L (ref 20–32)
CREAT SERPL-MCNC: 1.02 MG/DL (ref 0.7–1.22)
EGFR: 72 ML/MIN/1.73M2
EOSINOPHIL # BLD AUTO: 151 CELLS/UL (ref 15–500)
EOSINOPHIL NFR BLD AUTO: 3.5 %
ERYTHROCYTE [DISTWIDTH] IN BLOOD BY AUTOMATED COUNT: 12.8 % (ref 11–15)
GLOBULIN SER CALC-MCNC: 2 G/DL (CALC) (ref 1.9–3.7)
GLUCOSE SERPL-MCNC: 89 MG/DL (ref 65–99)
HCT VFR BLD AUTO: 43.6 % (ref 38.5–50)
HDLC SERPL-MCNC: 86 MG/DL
HGB BLD-MCNC: 15 G/DL (ref 13.2–17.1)
LDLC SERPL CALC-MCNC: 50 MG/DL (CALC)
LYMPHOCYTES # BLD AUTO: 1303 CELLS/UL (ref 850–3900)
LYMPHOCYTES NFR BLD AUTO: 30.3 %
MCH RBC QN AUTO: 36.1 PG (ref 27–33)
MCHC RBC AUTO-ENTMCNC: 34.4 G/DL (ref 32–36)
MCV RBC AUTO: 105.1 FL (ref 80–100)
MONOCYTES # BLD AUTO: 486 CELLS/UL (ref 200–950)
MONOCYTES NFR BLD AUTO: 11.3 %
NEUTROPHILS # BLD AUTO: 2322 CELLS/UL (ref 1500–7800)
NEUTROPHILS NFR BLD AUTO: 54 %
NONHDLC SERPL-MCNC: 69 MG/DL (CALC)
PLATELET # BLD AUTO: 188 THOUSAND/UL (ref 140–400)
PMV BLD REES-ECKER: 10.1 FL (ref 7.5–12.5)
POTASSIUM SERPL-SCNC: 4.8 MMOL/L (ref 3.5–5.3)
PROT SERPL-MCNC: 6.3 G/DL (ref 6.1–8.1)
PSA SERPL-MCNC: 0.54 NG/ML
RBC # BLD AUTO: 4.15 MILLION/UL (ref 4.2–5.8)
SODIUM SERPL-SCNC: 144 MMOL/L (ref 135–146)
TRIGL SERPL-MCNC: 104 MG/DL
TSH SERPL-ACNC: 2.48 MIU/L (ref 0.4–4.5)
WBC # BLD AUTO: 4.3 THOUSAND/UL (ref 3.8–10.8)

## 2024-05-03 ENCOUNTER — PATIENT MESSAGE (OUTPATIENT)
Dept: FAMILY MEDICINE | Facility: CLINIC | Age: 84
End: 2024-05-03
Payer: MEDICARE

## 2024-05-06 ENCOUNTER — OFFICE VISIT (OUTPATIENT)
Dept: FAMILY MEDICINE | Facility: CLINIC | Age: 84
End: 2024-05-06
Attending: FAMILY MEDICINE
Payer: MEDICARE

## 2024-05-06 VITALS
HEIGHT: 71 IN | OXYGEN SATURATION: 96 % | HEART RATE: 48 BPM | BODY MASS INDEX: 21.31 KG/M2 | DIASTOLIC BLOOD PRESSURE: 78 MMHG | SYSTOLIC BLOOD PRESSURE: 118 MMHG | WEIGHT: 152.25 LBS

## 2024-05-06 DIAGNOSIS — I71.43 INFRARENAL ABDOMINAL AORTIC ANEURYSM (AAA) WITHOUT RUPTURE: ICD-10-CM

## 2024-05-06 DIAGNOSIS — I71.40 ABDOMINAL AORTIC ANEURYSM (AAA) WITHOUT RUPTURE, UNSPECIFIED PART: ICD-10-CM

## 2024-05-06 DIAGNOSIS — F51.01 PRIMARY INSOMNIA: ICD-10-CM

## 2024-05-06 DIAGNOSIS — G30.1 MILD LATE ONSET ALZHEIMER'S DEMENTIA WITHOUT BEHAVIORAL DISTURBANCE, PSYCHOTIC DISTURBANCE, MOOD DISTURBANCE, OR ANXIETY: Primary | ICD-10-CM

## 2024-05-06 DIAGNOSIS — E78.2 MIXED HYPERLIPIDEMIA: ICD-10-CM

## 2024-05-06 DIAGNOSIS — I49.3 PVC'S (PREMATURE VENTRICULAR CONTRACTIONS): ICD-10-CM

## 2024-05-06 DIAGNOSIS — F32.9 REACTIVE DEPRESSION: ICD-10-CM

## 2024-05-06 DIAGNOSIS — N40.0 BENIGN PROSTATIC HYPERPLASIA WITHOUT LOWER URINARY TRACT SYMPTOMS: ICD-10-CM

## 2024-05-06 DIAGNOSIS — I10 HYPERTENSION, UNSPECIFIED TYPE: ICD-10-CM

## 2024-05-06 DIAGNOSIS — F02.A0 MILD LATE ONSET ALZHEIMER'S DEMENTIA WITHOUT BEHAVIORAL DISTURBANCE, PSYCHOTIC DISTURBANCE, MOOD DISTURBANCE, OR ANXIETY: Primary | ICD-10-CM

## 2024-05-06 PROCEDURE — 3078F DIAST BP <80 MM HG: CPT | Mod: CPTII,S$GLB,, | Performed by: FAMILY MEDICINE

## 2024-05-06 PROCEDURE — 1126F AMNT PAIN NOTED NONE PRSNT: CPT | Mod: CPTII,S$GLB,, | Performed by: FAMILY MEDICINE

## 2024-05-06 PROCEDURE — 1101F PT FALLS ASSESS-DOCD LE1/YR: CPT | Mod: CPTII,S$GLB,, | Performed by: FAMILY MEDICINE

## 2024-05-06 PROCEDURE — 99214 OFFICE O/P EST MOD 30 MIN: CPT | Mod: S$GLB,,, | Performed by: FAMILY MEDICINE

## 2024-05-06 PROCEDURE — 3288F FALL RISK ASSESSMENT DOCD: CPT | Mod: CPTII,S$GLB,, | Performed by: FAMILY MEDICINE

## 2024-05-06 PROCEDURE — 3074F SYST BP LT 130 MM HG: CPT | Mod: CPTII,S$GLB,, | Performed by: FAMILY MEDICINE

## 2024-05-07 NOTE — PROGRESS NOTES
SUBJECTIVE:    Patient ID: Nino Mackay is a 84 y.o. male.    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    He is very physically active and can walk the dogs and moves well around the house.    Has dementia and needs assistance with ADLs, donepezil was reduced to 5 mg due to GI side effects and anorexia.  He has lost 10 more lb since last visit.  He drinks water constantly, eats snacks all day long but occasionally does not eat his supper meal very well.  Patient's wife has not taken over his medication administration yet.  she leaves at up to the patient to administer his medications correctly which may not be a good idea.    He denies chest pain or shortness a breath with activity    Insomnia-trazodone 50 mg HS works 50% of the time.  His wife is asking if Tylenol  p.m. could be added    Sleep is disrupted by nocturia up to 8 times per night.    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    Follow-up  Pertinent negatives include no abdominal pain, chest pain, chills, coughing, fatigue, fever, joint swelling, myalgias, nausea, numbness or vomiting.       Telephone on 04/25/2024   Component Date Value Ref Range Status    Cholesterol 04/30/2024 155  <200 mg/dL Final    HDL 04/30/2024 86  > OR = 40 mg/dL Final    Triglycerides 04/30/2024 104  <150 mg/dL Final    LDL Cholesterol 04/30/2024 50  mg/dL (calc) Final    HDL/Cholesterol Ratio 04/30/2024 1.8  <5.0 (calc) Final    Non HDL Chol. (LDL+VLDL) 04/30/2024 69  <130 mg/dL (calc) Final    Glucose 04/30/2024 89  65 - 99 mg/dL Final    BUN 04/30/2024 18  7 - 25 mg/dL Final    Creatinine 04/30/2024 1.02  0.70 - 1.22 mg/dL Final    eGFR 04/30/2024 72  > OR = 60 mL/min/1.73m2  Final    BUN/Creatinine Ratio 04/30/2024 SEE NOTE:  6 - 22 (calc) Final    Sodium 04/30/2024 144  135 - 146 mmol/L Final    Potassium 04/30/2024 4.8  3.5 - 5.3 mmol/L Final    Chloride 04/30/2024 105  98 - 110 mmol/L Final    CO2 04/30/2024 31  20 - 32 mmol/L Final    Calcium 04/30/2024 9.2  8.6 - 10.3 mg/dL Final    Total Protein 04/30/2024 6.3  6.1 - 8.1 g/dL Final    Albumin 04/30/2024 4.3  3.6 - 5.1 g/dL Final    Globulin, Total 04/30/2024 2.0  1.9 - 3.7 g/dL (calc) Final    Albumin/Globulin Ratio 04/30/2024 2.2  1.0 - 2.5 (calc) Final    Total Bilirubin 04/30/2024 1.2  0.2 - 1.2 mg/dL Final    Alkaline Phosphatase 04/30/2024 70  35 - 144 U/L Final    AST 04/30/2024 22  10 - 35 U/L Final    ALT 04/30/2024 16  9 - 46 U/L Final    TSH w/reflex to FT4 04/30/2024 2.48  0.40 - 4.50 mIU/L Final    WBC 04/30/2024 4.3  3.8 - 10.8 Thousand/uL Final    RBC 04/30/2024 4.15 (L)  4.20 - 5.80 Million/uL Final    Hemoglobin 04/30/2024 15.0  13.2 - 17.1 g/dL Final    Hematocrit 04/30/2024 43.6  38.5 - 50.0 % Final    MCV 04/30/2024 105.1 (H)  80.0 - 100.0 fL Final    MCH 04/30/2024 36.1 (H)  27.0 - 33.0 pg Final    MCHC 04/30/2024 34.4  32.0 - 36.0 g/dL Final    RDW 04/30/2024 12.8  11.0 - 15.0 % Final    Platelets 04/30/2024 188  140 - 400 Thousand/uL Final    MPV 04/30/2024 10.1  7.5 - 12.5 fL Final    Neutrophils, Abs 04/30/2024 2,322  1,500 - 7,800 cells/uL Final    Lymph # 04/30/2024 1,303  850 - 3,900 cells/uL Final    Mono # 04/30/2024 486  200 - 950 cells/uL Final    Eos # 04/30/2024 151  15 - 500 cells/uL Final    Baso # 04/30/2024 39  0 - 200 cells/uL Final    Neutrophils Relative 04/30/2024 54  % Final    Lymph % 04/30/2024 30.3  % Final    Mono % 04/30/2024 11.3  % Final    Eosinophil % 04/30/2024 3.5  % Final    Basophil % 04/30/2024 0.9  % Final    PROSTATE SPECIFIC ANTIGEN, SCR - Q* 04/30/2024 0.54  < OR = 4.00 ng/mL Final       Past Medical History:   Diagnosis Date    Enlarged prostate     Hypertension      Kidney stone     Urinary tract infection      Social History     Socioeconomic History    Marital status:    Tobacco Use    Smoking status: Light Smoker     Types: Cigars    Smokeless tobacco: Never    Tobacco comments:     2 cigars/day   Substance and Sexual Activity    Alcohol use: Yes     Comment: 2 glasses of wine nightly    Drug use: Never     Social Determinants of Health     Financial Resource Strain: Medium Risk (2/23/2024)    Overall Financial Resource Strain (CARDIA)     Difficulty of Paying Living Expenses: Somewhat hard   Food Insecurity: No Food Insecurity (2/23/2024)    Hunger Vital Sign     Worried About Running Out of Food in the Last Year: Never true     Ran Out of Food in the Last Year: Never true   Transportation Needs: No Transportation Needs (2/23/2024)    PRAPARE - Transportation     Lack of Transportation (Medical): No     Lack of Transportation (Non-Medical): No   Physical Activity: Insufficiently Active (2/23/2024)    Exercise Vital Sign     Days of Exercise per Week: 7 days     Minutes of Exercise per Session: 20 min   Stress: Stress Concern Present (2/23/2024)    [employer] - Occupational Stress Questionnaire     Feeling of Stress : To some extent   Housing Stability: Low Risk  (2/23/2024)    Housing Stability Vital Sign     Unable to Pay for Housing in the Last Year: No     Number of Places Lived in the Last Year: 1     Unstable Housing in the Last Year: No     Past Surgical History:   Procedure Laterality Date    COLONOSCOPY      EXTRACORPOREAL SHOCK WAVE LITHOTRIPSY Left 5/29/2019    Procedure: LITHOTRIPSY, ESWL;  Surgeon: Kobi Troncoso MD;  [facility];  Service: Urology;  Laterality: Left;    HIP SURGERY  1990    JOINT REPLACEMENT      PROSTATECTOMY  2017    TONSILLECTOMY       No family history on file.    The CVD Risk score (DAVID'Agostino, et al., 2008) failed to calculate for the following reasons:    The 2008 CVD risk score is only  valid for ages 30 to 74    Tests to Keep You Healthy    Last Blood Pressure <= 139/89 (5/6/2024): Yes  Tobacco Cessation: NO      Review of patient's allergies indicates:  No Known Allergies    Current Outpatient Medications:     amLODIPine (NORVASC) 5 MG tablet, TK 1 T PO D, Disp: , Rfl: 3    carvediloL (COREG) 3.125 MG tablet, TK 1 T PO D Strength: 3.125 mg, Disp: 180 tablet, Rfl: 3    cloNIDine (CATAPRES) 0.1 MG tablet, See Instructions, 1 tablet daily as needed if systolic blood pressure [id number], # 90 tab, 3 Refill(s), Maintenance, Pharmacy: [facility] [id number] Strength: 0.1 mg, Disp: 90 tablet, Rfl: 1    donepeziL (ARICEPT) 5 MG tablet, Take 1 tablet (5 mg total) by mouth every evening., Disp: 90 tablet, Rfl: 3    fish oil-omega-3 fatty acids 300-1,000 mg capsule, Take by mouth once daily., Disp: , Rfl:     glucosamine-chondroitin 500-400 mg tablet, Take 1 tablet by mouth 3 (three) times daily., Disp: , Rfl:     lisinopriL-hydrochlorothiazide (PRINZIDE,ZESTORETIC) 10-12.5 mg per tablet, Take 1 tablet by mouth once daily., Disp: 90 tablet, Rfl: 3    loperamide (IMODIUM A-D) 2 mg Tab, Take 2 mg by mouth as needed., Disp: , Rfl:     lovastatin (MEVACOR) 20 MG tablet, TK 1 T PO HS Strength: 20 mg, Disp: 90 tablet, Rfl: 3    magnesium 200 mg Tab, Take 500 mg by mouth once., Disp: , Rfl:     omega 3-dha-epa-fish oil (FISH OIL) 100-160-1,000 mg Cap,  1 cap, Oral, Daily, # 100 cap, 0 Refill(s), Disp: , Rfl:     potassium chloride (KLOR-CON) 10 MEQ TbSR, TK 2 TS PO BID Strength: 10 mEq, Disp: 360 tablet, Rfl: 1    potassium chloride (MICRO-K) 10 MEQ CpSR, See Instructions, TAKE 2 TABLETS TWICE DAILY (DOSE CHANGE), # 360 tab, 0 Refill(s), Pharmacy: [facility], 182, cm, 04/12/21 9:23:00 CDT, Height/Length Measured, 84.7, kg, 04/12/21 9:23:00 CDT, Weight Dosing, Disp: 360 capsule, Rfl: 3    tamsulosin (FLOMAX) 0.4 mg Cap, Take 2 capsules (0.8 mg total) by mouth once daily., Disp: 180 capsule,  "Rfl: 3    traZODone (DESYREL) 50 MG tablet, Take 1 tablet (50 mg total) by mouth every evening., Disp: 90 tablet, Rfl: 0    aspirin (ECOTRIN) 81 MG EC tablet, aspirin 81 mg tablet,delayed release  Take 1 tablet every day by oral route. (Patient not taking: Reported on 5/6/2024), Disp: , Rfl:     Lactobacillus rhamnosus GG (CULTURELLE) 10 billion cell capsule, Take 1 capsule by mouth once daily. (Patient not taking: Reported on 5/6/2024), Disp: , Rfl:     meclizine (ANTIVERT) 25 mg tablet, Take 1 tablet (25 mg total) by mouth 3 (three) times daily as needed for Dizziness., Disp: 30 tablet, Rfl: 0    simethicone (MYLICON) 125 MG chewable tablet, Take 125 mg by mouth every 6 (six) hours as needed for Flatulence. (Patient not taking: Reported on 5/6/2024), Disp: , Rfl:     Review of Systems   Constitutional:  Negative for appetite change, chills, fatigue, fever and unexpected weight change.   HENT:  Negative for ear pain and trouble swallowing.    Eyes:  Negative for pain, discharge and visual disturbance.   Respiratory:  Negative for apnea, cough, shortness of breath and wheezing.    Cardiovascular:  Negative for chest pain and leg swelling.   Gastrointestinal:  Negative for abdominal pain, blood in stool, constipation, diarrhea, nausea, vomiting and reflux.   Endocrine: Negative for cold intolerance, heat intolerance and polydipsia.   Genitourinary:  Negative for bladder incontinence, dysuria, erectile dysfunction, frequency, hematuria, testicular pain and urgency.   Musculoskeletal:  Negative for gait problem, joint swelling and myalgias.   Neurological:  Positive for memory loss. Negative for dizziness, seizures and numbness.   Psychiatric/Behavioral:  Positive for confusion. Negative for agitation, behavioral problems and hallucinations. The patient is not nervous/anxious.            Objective:      Vitals:    05/06/24 1122   BP: 118/78   Pulse: (!) 48   SpO2: 96%   Weight: 69.1 kg (152 lb 4 oz)   Height: 5' 11" " (1.803 m)     Physical Exam  Vitals and nursing note reviewed.   Constitutional:       General: He is not in acute distress.     Appearance: Normal appearance. He is well-developed. He is not toxic-appearing.   HENT:      Head: Normocephalic and atraumatic.      Right Ear: Tympanic membrane and external ear normal.      Left Ear: Tympanic membrane and external ear normal.      Nose: Nose normal.      Mouth/Throat:      Pharynx: Oropharynx is clear.   Eyes:      Pupils: Pupils are equal, round, and reactive to light.   Neck:      Thyroid: No thyromegaly.      Vascular: No carotid bruit.   Cardiovascular:      Rate and Rhythm: Normal rate and regular rhythm.      Heart sounds: Normal heart sounds. No murmur heard.  Pulmonary:      Effort: Pulmonary effort is normal.      Breath sounds: Normal breath sounds. No wheezing or rales.   Abdominal:      General: Bowel sounds are normal. There is no distension.      Palpations: Abdomen is soft.      Tenderness: There is no abdominal tenderness.   Musculoskeletal:         General: No tenderness or deformity. Normal range of motion.      Cervical back: Normal range of motion and neck supple.      Lumbar back: Normal. No spasms.      Comments: Bends 90 degrees at  waist shoulders and knees have good range of motion without crepitance.  No pitting edema to lower extremities   Lymphadenopathy:      Cervical: No cervical adenopathy.   Skin:     General: Skin is warm and dry.      Findings: No rash.      Comments: Left neck has a moderate-sized lipoma under the jaw line   Neurological:      Mental Status: He is alert and oriented to person, place, and time.      Cranial Nerves: No cranial nerve deficit.      Motor: No weakness.      Coordination: Coordination normal.      Gait: Gait normal.   Psychiatric:         Mood and Affect: Mood normal.         Behavior: Behavior normal.         Thought Content: Thought content normal.         Judgment: Judgment normal.           Assessment:        1. Mild late onset Alzheimer's dementia without behavioral disturbance, psychotic disturbance, mood disturbance, or anxiety    2. Reactive depression    3. PVC's (premature ventricular contractions)    4. Mixed hyperlipidemia    5. Infrarenal abdominal aortic aneurysm (AAA) without rupture    6. Hypertension, unspecified type    7. Abdominal aortic aneurysm (AAA) without rupture, unspecified part    8. Benign prostatic hyperplasia without lower urinary tract symptoms    9. Primary insomnia         Plan:       Mild late onset Alzheimer's dementia without behavioral disturbance, psychotic disturbance, mood disturbance, or anxiety  Agree with reduced donepezil down to 5 mg daily.  Recommend that his wife take over medication administration to ensure compliance with medications  Reactive depression    PVC's (premature ventricular contractions)    Mixed hyperlipidemia  Cholesterol 155 HDL 86  LDL 50  Infrarenal abdominal aortic aneurysm (AAA) without rupture    Hypertension, unspecified type  Blood pressure well controlled  Abdominal aortic aneurysm (AAA) without rupture, unspecified part    Benign prostatic hyperplasia without lower urinary tract symptoms  Increase Flomax to 0.8 mg nightly to decrease nocturia  Primary insomnia  Add Tylenol p.m. to the trazodone for better sleep    Follow up in about 6 months (around 11/6/2024), or dementia.        5/6/2024 Yonas Alaniz

## 2024-05-11 ENCOUNTER — PATIENT MESSAGE (OUTPATIENT)
Dept: FAMILY MEDICINE | Facility: CLINIC | Age: 84
End: 2024-05-11
Payer: MEDICARE

## 2024-06-12 ENCOUNTER — PATIENT MESSAGE (OUTPATIENT)
Dept: FAMILY MEDICINE | Facility: CLINIC | Age: 84
End: 2024-06-12
Payer: MEDICARE

## 2024-06-12 DIAGNOSIS — N40.0 BENIGN PROSTATIC HYPERPLASIA WITHOUT LOWER URINARY TRACT SYMPTOMS: ICD-10-CM

## 2024-06-12 RX ORDER — TAMSULOSIN HYDROCHLORIDE 0.4 MG/1
2 CAPSULE ORAL DAILY
Qty: 180 CAPSULE | Refills: 3 | Status: SHIPPED | OUTPATIENT
Start: 2024-06-12

## 2024-06-13 ENCOUNTER — PATIENT MESSAGE (OUTPATIENT)
Dept: FAMILY MEDICINE | Facility: CLINIC | Age: 84
End: 2024-06-13
Payer: MEDICARE

## 2024-06-20 ENCOUNTER — PATIENT MESSAGE (OUTPATIENT)
Dept: FAMILY MEDICINE | Facility: CLINIC | Age: 84
End: 2024-06-20
Payer: MEDICARE

## 2024-06-21 ENCOUNTER — PATIENT MESSAGE (OUTPATIENT)
Dept: FAMILY MEDICINE | Facility: CLINIC | Age: 84
End: 2024-06-21
Payer: MEDICARE

## 2024-07-01 ENCOUNTER — PATIENT MESSAGE (OUTPATIENT)
Dept: FAMILY MEDICINE | Facility: CLINIC | Age: 84
End: 2024-07-01
Payer: MEDICARE

## 2024-08-25 ENCOUNTER — PATIENT MESSAGE (OUTPATIENT)
Dept: FAMILY MEDICINE | Facility: CLINIC | Age: 84
End: 2024-08-25
Payer: MEDICARE

## 2024-08-25 DIAGNOSIS — F51.01 PRIMARY INSOMNIA: ICD-10-CM

## 2024-08-26 RX ORDER — TRAZODONE HYDROCHLORIDE 50 MG/1
50 TABLET ORAL NIGHTLY
Qty: 90 TABLET | Refills: 3 | Status: SHIPPED | OUTPATIENT
Start: 2024-08-26 | End: 2025-08-26

## 2024-09-30 DIAGNOSIS — R41.3 MEMORY LOSS: ICD-10-CM

## 2024-09-30 RX ORDER — DONEPEZIL HYDROCHLORIDE 5 MG/1
5 TABLET, FILM COATED ORAL NIGHTLY
Qty: 90 TABLET | Refills: 3 | Status: SHIPPED | OUTPATIENT
Start: 2024-09-30 | End: 2025-09-30

## 2024-10-05 ENCOUNTER — PATIENT MESSAGE (OUTPATIENT)
Dept: FAMILY MEDICINE | Facility: CLINIC | Age: 84
End: 2024-10-05
Payer: MEDICARE

## 2024-10-05 DIAGNOSIS — R41.3 MEMORY LOSS: ICD-10-CM

## 2024-10-07 RX ORDER — DONEPEZIL HYDROCHLORIDE 5 MG/1
5 TABLET, FILM COATED ORAL NIGHTLY
Qty: 90 TABLET | Refills: 3 | Status: SHIPPED | OUTPATIENT
Start: 2024-10-07 | End: 2024-10-08 | Stop reason: SDUPTHER

## 2024-10-08 ENCOUNTER — PATIENT MESSAGE (OUTPATIENT)
Dept: FAMILY MEDICINE | Facility: CLINIC | Age: 84
End: 2024-10-08
Payer: MEDICARE

## 2024-10-08 DIAGNOSIS — R41.3 MEMORY LOSS: ICD-10-CM

## 2024-10-08 RX ORDER — DONEPEZIL HYDROCHLORIDE 5 MG/1
5 TABLET, FILM COATED ORAL NIGHTLY
Qty: 90 TABLET | Refills: 3 | Status: SHIPPED | OUTPATIENT
Start: 2024-10-08 | End: 2025-10-08

## 2024-10-08 NOTE — TELEPHONE ENCOUNTER
----- Message from Med Assistant Padilla sent at 10/8/2024 11:42 AM CDT -----  Pt wife (bandar) is calling requesting refills on donepezil says it went to [facility] she need it to go to [facility] pharmacy says pt have not taken medication in 2 days      Bandar # [phone]

## 2024-11-06 ENCOUNTER — PATIENT MESSAGE (OUTPATIENT)
Dept: FAMILY MEDICINE | Facility: CLINIC | Age: 84
End: 2024-11-06
Payer: MEDICARE

## 2024-11-06 RX ORDER — QUETIAPINE FUMARATE 50 MG/1
50 TABLET, FILM COATED ORAL NIGHTLY
Qty: 30 TABLET | Refills: 3 | Status: SHIPPED | OUTPATIENT
Start: 2024-11-06 | End: 2025-11-06

## 2024-11-11 ENCOUNTER — PATIENT MESSAGE (OUTPATIENT)
Dept: NEUROLOGY | Facility: CLINIC | Age: 84
End: 2024-11-11
Payer: MEDICARE

## 2024-11-11 ENCOUNTER — PATIENT MESSAGE (OUTPATIENT)
Dept: FAMILY MEDICINE | Facility: CLINIC | Age: 84
End: 2024-11-11
Payer: MEDICARE

## 2024-11-12 ENCOUNTER — TELEPHONE (OUTPATIENT)
Dept: NEUROLOGY | Facility: CLINIC | Age: 84
End: 2024-11-12
Payer: MEDICARE

## 2024-11-12 RX ORDER — QUETIAPINE FUMARATE 50 MG/1
25 TABLET, FILM COATED ORAL NIGHTLY
COMMUNITY
End: 2024-11-14

## 2024-11-14 ENCOUNTER — OFFICE VISIT (OUTPATIENT)
Dept: NEUROLOGY | Facility: CLINIC | Age: 84
End: 2024-11-14
Payer: MEDICARE

## 2024-11-14 VITALS
HEIGHT: 71 IN | RESPIRATION RATE: 12 BRPM | WEIGHT: 165.25 LBS | DIASTOLIC BLOOD PRESSURE: 72 MMHG | HEART RATE: 58 BPM | SYSTOLIC BLOOD PRESSURE: 120 MMHG | BODY MASS INDEX: 23.14 KG/M2

## 2024-11-14 DIAGNOSIS — G30.1 MILD LATE ONSET ALZHEIMER'S DEMENTIA WITHOUT BEHAVIORAL DISTURBANCE, PSYCHOTIC DISTURBANCE, MOOD DISTURBANCE, OR ANXIETY: Primary | ICD-10-CM

## 2024-11-14 DIAGNOSIS — F02.A0 MILD LATE ONSET ALZHEIMER'S DEMENTIA WITHOUT BEHAVIORAL DISTURBANCE, PSYCHOTIC DISTURBANCE, MOOD DISTURBANCE, OR ANXIETY: Primary | ICD-10-CM

## 2024-11-14 PROCEDURE — 3078F DIAST BP <80 MM HG: CPT | Mod: HCNC,CPTII,S$GLB, | Performed by: NURSE PRACTITIONER

## 2024-11-14 PROCEDURE — 99999 PR PBB SHADOW E&M-EST. PATIENT-LVL IV: CPT | Mod: PBBFAC,HCNC,, | Performed by: NURSE PRACTITIONER

## 2024-11-14 PROCEDURE — 1101F PT FALLS ASSESS-DOCD LE1/YR: CPT | Mod: HCNC,CPTII,S$GLB, | Performed by: NURSE PRACTITIONER

## 2024-11-14 PROCEDURE — 1160F RVW MEDS BY RX/DR IN RCRD: CPT | Mod: HCNC,CPTII,S$GLB, | Performed by: NURSE PRACTITIONER

## 2024-11-14 PROCEDURE — 1126F AMNT PAIN NOTED NONE PRSNT: CPT | Mod: HCNC,CPTII,S$GLB, | Performed by: NURSE PRACTITIONER

## 2024-11-14 PROCEDURE — 3074F SYST BP LT 130 MM HG: CPT | Mod: HCNC,CPTII,S$GLB, | Performed by: NURSE PRACTITIONER

## 2024-11-14 PROCEDURE — 1159F MED LIST DOCD IN RCRD: CPT | Mod: HCNC,CPTII,S$GLB, | Performed by: NURSE PRACTITIONER

## 2024-11-14 PROCEDURE — 3288F FALL RISK ASSESSMENT DOCD: CPT | Mod: HCNC,CPTII,S$GLB, | Performed by: NURSE PRACTITIONER

## 2024-11-14 PROCEDURE — 99214 OFFICE O/P EST MOD 30 MIN: CPT | Mod: HCNC,S$GLB,, | Performed by: NURSE PRACTITIONER

## 2024-11-14 RX ORDER — QUETIAPINE FUMARATE 25 MG/1
25 TABLET, FILM COATED ORAL DAILY PRN
Qty: 30 TABLET | Refills: 3 | Status: SHIPPED | OUTPATIENT
Start: 2024-11-14 | End: 2025-11-14

## 2024-11-14 NOTE — PROGRESS NOTES
"  NEUROLOGY  Outpatient Follow Up    [facility]  [address], [place] [address]  [phone] (office) / [phone] (fax)    Patient Name:  Nino Mackay  :  1940  MR #:  [id number]  Acct #:  [id number]    Date of Neurology Visit: 2024  Name of Provider: CARON Sinha    Other Physicians:  Yonas Alaniz MD (Primary Care Physician); No ref. provider found (Referring)      Chief Complaint: Follow-up      History of Present Illness (HPI):    Nino Mackay is a left handed 83 y.o. male with a PMHX of HTN, Kidney stones, UTI  Patient is here today for memory loss. He is accompanied by his spouse and daughter who help supply information. He states he does forget certain words & names at times.      Patient's highest level of education completed was college. He was a musician and artist. The onset of memory issues likely began in the last 6-8 months. He struggles more with short term memory loss. Spouse states he has increased frustration and is short tempered at times. He denies depression and hallucinations. He is now taking Trazodone which does offer aid. Previously he was having nightmares, even as a youth. Trazodone does help with this. He does write things down to assist with memory.  Spouse is managing the finances and has always done so. He is managing is own medications. Every so often he may forget if he has taken a pill or not. There are no issues with hygiene and able to perform ADLs without assistance. He does have word finding difficulty. He also does have urinary frequency. He denies recent falls. He is no longer driving due to ongoing vertigo. He likes to read and plays music.  He was placed on Aricept about 5-6 months ago by his PCP for memory loss and has tolerated it well.         Interval Hx 2023:   Patient presents today or memory follow up. He is accompanied by his spouse. Overall patient states he is doing "fine". He " states his memory is not as good as it use to be. He uses a book to recall names and times. He continues to paint for brain stimulation. He continues to have some frustration towards spouse or may be irritable at times. He denies depression and hallucinations. He continues to take Trazodone for sleep which offers aid. There is no reported RBD. Spouse is managing the finances and has always done so. He is managing is own medications. There are no issues with hygiene and able to perform ADLs without assistance. He does have word finding difficulty. He also does have urinary frequency. He denies recent falls. He is no longer driving due to ongoing vertigo. He is eating well and following a mediterranean diet. He drinks 2 glasses of wine nightly.       Interval Hx 2/26/2024:  Patient presents today or memory follow up. He is accompanied by his spouse and daughter. Overall he believes his stable. Family believes he is stable as well but depends on the day. He sticks with a daily routine. Spouse continues to report irritability, especially in the evenings. He sleeps well at night. There is no reported RBD. He does take Trazodone for sleep as well as drinking 2 glasses of wine. About 3 months ago he got up to use the restroom and fell. He denies depression and hallucinations. Spouse is managing the finances and has always done so. He is managing is own medications but will need reminding. He is able to perform ADLs without assistance.  His spouse notices more confusion when out of his environment. He is no longer driving. He was started on Aricept in 2022 and did well. Spouse reports diarrhea in the last 6-12 mths. Aricept 10 mg was increased in Feb 2023.   Spouse states he has not played music like he use to. This is concerning to her. He states he doesn't want to do play anymore but likes doing other activities like painting.     Concerns below from family:        Interval Hx 11/14/2024:  Patient presents today for  sundowning symptoms. He is accompanied by his spouse and daughter who supply information. Spouse states the patient has displayed increased agitation and sun downing in the evenings, more so since the time change. There was one time his behavior was intense where he was frantic. There are other times he is on edge. Family can redirect most of the time. He was prescribed Seroquel 50 mg by his PCP but spouse states this snowed him over.           Past Medical, Surgical, Family & Social History:   Reviewed and updated.    Home Medications:     Current Outpatient Medications:     amLODIPine (NORVASC) 5 MG tablet, TK 1 T PO D, Disp: , Rfl: 3    carvediloL (COREG) 3.125 MG tablet, TK 1 T PO D Strength: 3.125 mg, Disp: 180 tablet, Rfl: 3    cloNIDine (CATAPRES) 0.1 MG tablet, See Instructions, 1 tablet daily as needed if systolic blood pressure [id number], # 90 tab, 3 Refill(s), Maintenance, Pharmacy: [facility] [id number] Strength: 0.1 mg, Disp: 90 tablet, Rfl: 1    donepeziL (ARICEPT) 5 MG tablet, Take 1 tablet (5 mg total) by mouth every evening., Disp: 90 tablet, Rfl: 3    fish oil-omega-3 fatty acids 300-1,000 mg capsule, Take by mouth once daily., Disp: , Rfl:     glucosamine-chondroitin 500-400 mg tablet, Take 1 tablet by mouth 3 (three) times daily., Disp: , Rfl:     lisinopriL-hydrochlorothiazide (PRINZIDE,ZESTORETIC) 10-12.5 mg per tablet, Take 1 tablet by mouth once daily., Disp: 90 tablet, Rfl: 3    lovastatin (MEVACOR) 20 MG tablet, TK 1 T PO HS Strength: 20 mg, Disp: 90 tablet, Rfl: 3    magnesium 200 mg Tab, Take 500 mg by mouth once., Disp: , Rfl:     omega 3-dha-epa-fish oil (FISH OIL) 100-160-1,000 mg Cap,  1 cap, Oral, Daily, # 100 cap, 0 Refill(s), Disp: , Rfl:     potassium chloride (KLOR-CON) 10 MEQ TbSR, TK 2 TS PO BID Strength: 10 mEq, Disp: 360 tablet, Rfl: 1    potassium chloride (MICRO-K) 10 MEQ CpSR, See Instructions, TAKE 2 TABLETS TWICE DAILY (DOSE CHANGE), # 360 tab, 0 Refill(s), Pharmacy:  "[facility], 182, cm, 04/12/21 9:23:00 CDT, Height/Length Measured, 84.7, kg, 04/12/21 9:23:00 CDT, Weight Dosing, Disp: 360 capsule, Rfl: 3    tamsulosin (FLOMAX) 0.4 mg Cap, Take 2 capsules (0.8 mg total) by mouth once daily., Disp: 180 capsule, Rfl: 3    traZODone (DESYREL) 50 MG tablet, Take 1 tablet (50 mg total) by mouth every evening., Disp: 90 tablet, Rfl: 3    aspirin (ECOTRIN) 81 MG EC tablet, aspirin 81 mg tablet,delayed release  Take 1 tablet every day by oral route. (Patient not taking: Reported on 11/14/2024), Disp: , Rfl:     QUEtiapine (SEROQUEL) 25 MG Tab, Take 1 tablet (25 mg total) by mouth daily as needed (agitation)., Disp: 30 tablet, Rfl: 3    Physical Examination:  /72 (BP Location: Right arm, Patient Position: Sitting)   Pulse (!) 58   Resp 12   Ht 5' 11" (1.803 m)   Wt 75 kg (165 lb 3.8 oz)   BMI 23.05 kg/m²     GENERAL:  General appearance: Well, non-toxic appearing.  No apparent distress.  Neck: supple.        MENTAL STATUS:  Alertness, attention span & concentration: normal.  Language: normal.  Orientation to self, place & time:  normal.  Memory, recent & remote: normal.  Fund of knowledge: normal.  MMSE: 22/30 (2/2026)  25/30 (8/2023)  27/30 (2/2023)  27/30 (1/2023 by PCP)  17/30 (10/2022 by PCP)     SPEECH:  Clear and fluent.  Follows complex commands.        CRANIAL NERVES:  Cranial Nerves II-XII were examined.  II - Visual fields: normal.  III, IV, VI: PERRL, EOMI, No ptosis, No nystagmus.  V - Facial sensation: normal.  VII - Face symmetry & mobility: normal.  VIII - Hearing: normal  IX, X - Palate: mobile & midline.  XI - Shoulder shrug: normal.  XII - Tongue protrusion: normal.           GROSS MOTOR:  Gait & station: mildly stooped; good arm swing and step height  Tone: normal.  Abnormal movements: none.  Finger-nose: normal.  Rapid alternating movements: normal.  Pronator drift: normal        MUSCLE STRENGTH:   Hand grasp:   - right:5/5   - left:5/5   " "  RIGHT      LEFT   5 Deltoids 5   5 Biceps 5   5 Triceps 5   5 Forearm.Pr. 5           5 Iliopsoas flex    5   5 Hip Abduct 5   5 Hip Adduct 5   5 Quads 5   5 Hams 5   5 Dorsiflex 5   5 Plantar Flex 5                    REFLEXES:     RIGHT Reflex    LEFT   2+ Biceps 2+   2+ Brachiorad. 2+           2+ Patellar 2+      SENSORY:  Light touch: Normal throughout.             Diagnostic Data Reviewed:   I have personally reviewed provider notes, labs and imaging made available to me today.     Imaging:  MRI brain 3/2023:  FINDINGS: Diffusion-weighted imaging is negative for acute ischemia or focal lesion. Gradient-echo images show no evidence of intracranial hemorrhage.     Mild cerebral atrophy with associated ventricular and sulcal enlargement. Mild periventricular and scattered deep white matter T2/FLAIR hyperintensities consistent with microangiopathic change. Cerebellar hemispheres and brainstem are unremarkable. Major intracranial T2 flow-voids are patent.     Mastoid air cells are clear. Visualized paranasal sinuses are clear.     No bone marrow signal abnormality. Pituitary gland is within normal limits.     IMPRESSION:     Negative for acute ischemia or acute intracranial pathology.     Mild white matter microangiopathic changes.     Mild cerebral atrophy.      Labs:  Lab Results   Component Value Date    WBC 4.3 04/30/2024    HGB 15.0 04/30/2024    HCT 43.6 04/30/2024     04/30/2024    .1 (H) 04/30/2024    RDW 12.8 04/30/2024     Lab Results   Component Value Date     04/30/2024    K 4.8 04/30/2024     04/30/2024    CO2 31 04/30/2024    BUN 18 04/30/2024    CREATININE 1.02 04/30/2024    GLU 89 04/30/2024    CALCIUM 9.2 04/30/2024     Lab Results   Component Value Date    PROT 6.3 04/30/2024    ALBUMIN 4.3 04/30/2024    BILITOT 1.2 04/30/2024    AST 22 04/30/2024    ALKPHOS 56 01/23/2023    ALT 16 04/30/2024     No results found for: "INR", "PROTIME", "PTT"  Lab Results   Component " "Value Date    CHOL 155 04/30/2024    HDL 86 04/30/2024    LDLCALC 50 04/30/2024    TRIG 104 04/30/2024    CHOLHDL 1.8 04/30/2024     No results found for: "LABA1C", "HGBA1C"   Lab Results   Component Value Date    WHAZREPK83 319 02/27/2023     Lab Results   Component Value Date    FOLATE 16.3 02/27/2023     Lab Results   Component Value Date    TSH 1.700 10/25/2022       NP testing 5/2023:  "Mr. Mackay and his family reported an onset of cognitive change in mid to late 2022. Performance on a brief mental status screening measure improved between October 2022 and February 2023, associated with an improvement in consistency and quality of sleep and other lifestyle changes. He remains independent in activities of daily living.     Neuropsychological assessment results were interpreted in the context of estimated high average premorbid abilities and variable performance validity. Within that context, he demonstrated intact auditory attention and variable working memory. Processing speed was largely consistent with premorbid estimates. Executive functioning was variable. He demonstrated reduced auditory comprehension and naming on language testing. Visuospatial functioning was intact. Fine motor speed and dexterity were intact using his dominant hand and relatively reduced using his nondominant hand. Learning, recall, and recognition of verbal information were reduced. Learning of visual information was intact, with reduced retention and some benefit from recognition. He did not endorse significant anxiety or depression on self-report measures.     In sum, Mr. Mackay demonstrated reductions or relative reductions in aspects of working memory, executive functioning, language, fine motor speed, and learning and memory. Patterns of performance were outside of expectation based on performance validity and on reports from Mr. Mackay and his wife of improvement in cognitive abilities with improved quality of sleep " "and other lifestyle changes. He appeared dysphoric and quite frustrated with his performance during testing, which may have impacted his scores. Although patterns of performance do raise suspicion for a mild neurocognitive disorder, diagnosis is deferred given these discrepancies, and continued monitoring over time is warranted."                Assessment and Plan:  Problem List Items Addressed This Visit          Neuro    Mild late onset Alzheimer's dementia without behavioral disturbance, psychotic disturbance, mood disturbance, or anxiety - Primary    Current Assessment & Plan      Family and patent report difficulty with word finding and short term recall.   There are no behavioral changes though he does get easily frustrated or irritable at times. No hallucinations, depression. His sleep is much improved since starting Trazodone.  Recent episodes of sundowning and agitation.   Prev MMSE 22/30   - diagnosis deferred by Neuro psych but there may be suspicion for mild neurocognitive disorder.    - pt does get frustrated during MMSE; hold for now  MRI brain scan noted with scattered white matter changes  Serologies noted   - rec B12 supplements   Discussed role vs expectation of cognitive enhancing mediations at length   - there is suspicion that the increase in Aricept last year has caused diarrhea. He has done well on decreased dose (5 mg).    - consider initiation of Namenda in future  The use of antipsychotics like Seroquel and black-box warning were discussed at length.    - OK to give 12.5 mg/25 mg as needed for agitation after diversion has failed.   Discussed ways to promote brain stimulation and healthy sleep routine.                     Important to note, also  has a past medical history of Enlarged prostate, Hypertension, Kidney stone, and Urinary tract infection.          The patient will return to clinic in 4 months.    All questions were answered and patient is comfortable with the plan.         Thank " you very much for the opportunity to assist in this patient's care.    If you have any questions or concerns, please do not hesitate to contact me at any time.      Sincerely,     CARON Sinha  [facility] [place]

## 2024-11-14 NOTE — ASSESSMENT & PLAN NOTE
Family and patent report difficulty with word finding and short term recall.   There are no behavioral changes though he does get easily frustrated or irritable at times. No hallucinations, depression. His sleep is much improved since starting Trazodone.  Recent episodes of sundowning and agitation.   Prev MMSE 22/30   - diagnosis deferred by Neuro psych but there may be suspicion for mild neurocognitive disorder.    - pt does get frustrated during MMSE; hold for now  MRI brain scan noted with scattered white matter changes  Serologies noted   - rec B12 supplements   Discussed role vs expectation of cognitive enhancing mediations at length   - there is suspicion that the increase in Aricept last year has caused diarrhea. He has done well on decreased dose (5 mg).    - consider initiation of Namenda in future  The use of antipsychotics like Seroquel and black-box warning were discussed at length.    - OK to give 12.5 mg/25 mg as needed for agitation after diversion has failed.   Discussed ways to promote brain stimulation and healthy sleep routine.

## 2024-11-19 ENCOUNTER — TELEPHONE (OUTPATIENT)
Dept: FAMILY MEDICINE | Facility: CLINIC | Age: 84
End: 2024-11-19

## 2024-11-19 NOTE — TELEPHONE ENCOUNTER
----- Message from Katherine sent at 11/19/2024  7:35 AM CST -----  - 7:40- pt is unable to to make the 10 am appt   [phone]

## 2024-11-24 ENCOUNTER — PATIENT MESSAGE (OUTPATIENT)
Dept: NEUROLOGY | Facility: CLINIC | Age: 84
End: 2024-11-24
Payer: MEDICARE

## 2024-11-25 ENCOUNTER — TELEPHONE (OUTPATIENT)
Dept: NEUROLOGY | Facility: CLINIC | Age: 84
End: 2024-11-25
Payer: MEDICARE

## 2024-11-25 NOTE — TELEPHONE ENCOUNTER
Called pts caregiver to ask about Seroquel Rx, she says the insurance would not pay for it unless certain requirements were met despite having being already prescribed the Med, pts caregiver says she has alanis than enough to give pt for seldom sundown episodes but will ask Dr Alaniz if he can prescribe a lower dose.

## 2024-11-26 ENCOUNTER — OFFICE VISIT (OUTPATIENT)
Dept: FAMILY MEDICINE | Facility: CLINIC | Age: 84
End: 2024-11-26
Payer: MEDICARE

## 2024-11-26 ENCOUNTER — TELEPHONE (OUTPATIENT)
Dept: FAMILY MEDICINE | Facility: CLINIC | Age: 84
End: 2024-11-26

## 2024-11-26 VITALS
WEIGHT: 167 LBS | DIASTOLIC BLOOD PRESSURE: 60 MMHG | SYSTOLIC BLOOD PRESSURE: 104 MMHG | OXYGEN SATURATION: 98 % | BODY MASS INDEX: 23.38 KG/M2 | HEART RATE: 60 BPM | HEIGHT: 71 IN

## 2024-11-26 DIAGNOSIS — F32.9 REACTIVE DEPRESSION: ICD-10-CM

## 2024-11-26 DIAGNOSIS — R41.89 OTHER SYMPTOMS AND SIGNS INVOLVING COGNITIVE FUNCTIONS AND AWARENESS: ICD-10-CM

## 2024-11-26 DIAGNOSIS — I71.40 ABDOMINAL AORTIC ANEURYSM (AAA) WITHOUT RUPTURE, UNSPECIFIED PART: ICD-10-CM

## 2024-11-26 DIAGNOSIS — I49.3 PVC'S (PREMATURE VENTRICULAR CONTRACTIONS): ICD-10-CM

## 2024-11-26 DIAGNOSIS — N40.0 BENIGN PROSTATIC HYPERPLASIA WITHOUT LOWER URINARY TRACT SYMPTOMS: ICD-10-CM

## 2024-11-26 DIAGNOSIS — I10 HYPERTENSION, UNSPECIFIED TYPE: ICD-10-CM

## 2024-11-26 DIAGNOSIS — N20.0 KIDNEY STONE ON LEFT SIDE: ICD-10-CM

## 2024-11-26 DIAGNOSIS — I71.43 INFRARENAL ABDOMINAL AORTIC ANEURYSM (AAA) WITHOUT RUPTURE: ICD-10-CM

## 2024-11-26 DIAGNOSIS — F02.A0 MILD LATE ONSET ALZHEIMER'S DEMENTIA WITHOUT BEHAVIORAL DISTURBANCE, PSYCHOTIC DISTURBANCE, MOOD DISTURBANCE, OR ANXIETY: Primary | ICD-10-CM

## 2024-11-26 DIAGNOSIS — F51.01 PRIMARY INSOMNIA: ICD-10-CM

## 2024-11-26 DIAGNOSIS — E78.2 MIXED HYPERLIPIDEMIA: ICD-10-CM

## 2024-11-26 DIAGNOSIS — G30.1 MILD LATE ONSET ALZHEIMER'S DEMENTIA WITHOUT BEHAVIORAL DISTURBANCE, PSYCHOTIC DISTURBANCE, MOOD DISTURBANCE, OR ANXIETY: Primary | ICD-10-CM

## 2024-11-26 PROCEDURE — 99213 OFFICE O/P EST LOW 20 MIN: CPT | Mod: S$GLB,,, | Performed by: FAMILY MEDICINE

## 2024-11-26 PROCEDURE — 3078F DIAST BP <80 MM HG: CPT | Mod: CPTII,S$GLB,, | Performed by: FAMILY MEDICINE

## 2024-11-26 PROCEDURE — 3288F FALL RISK ASSESSMENT DOCD: CPT | Mod: CPTII,S$GLB,, | Performed by: FAMILY MEDICINE

## 2024-11-26 PROCEDURE — 1159F MED LIST DOCD IN RCRD: CPT | Mod: CPTII,S$GLB,, | Performed by: FAMILY MEDICINE

## 2024-11-26 PROCEDURE — 3074F SYST BP LT 130 MM HG: CPT | Mod: CPTII,S$GLB,, | Performed by: FAMILY MEDICINE

## 2024-11-26 PROCEDURE — 1101F PT FALLS ASSESS-DOCD LE1/YR: CPT | Mod: CPTII,S$GLB,, | Performed by: FAMILY MEDICINE

## 2024-11-26 RX ORDER — TAMSULOSIN HYDROCHLORIDE 0.4 MG/1
2 CAPSULE ORAL DAILY
Qty: 180 CAPSULE | Refills: 3 | Status: SHIPPED | OUTPATIENT
Start: 2024-11-26

## 2024-11-26 RX ORDER — VIBEGRON 75 MG/1
1 TABLET, FILM COATED ORAL NIGHTLY
Qty: 30 TABLET | Refills: 2 | Status: SHIPPED | OUTPATIENT
Start: 2024-11-26

## 2024-11-26 NOTE — TELEPHONE ENCOUNTER
----- Message from Batool sent at 11/26/2024  4:28 PM CST -----  Pt needs to schedule a 4 month f/u.    [phone]

## 2024-11-26 NOTE — PROGRESS NOTES
SUBJECTIVE:    Patient ID: Nino Mackay is a 84 y.o. male.    Chief Complaint: Follow-up (Went over meds verbally, discuss about medication, dementia, abc )    Follow-up  Pertinent negatives include no abdominal pain, chest pain, chills, coughing, fatigue, fever, joint swelling, myalgias, nausea, numbness or vomiting.       History of Present Illness    CHIEF COMPLAINT:  Nino presents today for follow-up on various health concerns.    MEMORY CONCERNS:  He reports experiencing progressive memory decline but denies significant impact on daily functioning or mobility. He maintains the ability to walk quickly and carry out surveillance activities when accompanied by his caregiver.    PSYCHIATRIC MEDICATION:  He reports an adverse reaction to Seroquel. A neuropsychologist recommended lowering the dose. He is currently taking Seroquel 25 mg and expresses satisfaction with the medication despite insurance coverage issues.    URINARY ISSUES:  He reports frequent nocturnal urination, getting up 3-5 times per night, occasionally up to 10 times. He has a history of prostate surgery for an enlarged prostate performed several years ago. He also reports a history of kidney stones. His PSA levels are currently reported as low.    MEDICATIONS:  Current medications include Tamsulosin, magnesium, trazodone, and potassium. He takes morning and evening medications separately, managing evening medications independently. He requires a Tamsulosin refill to be sent to [facility]. He inquired about a new medication with fewer side effects to reduce urinary frequency.      ROS:  Constitutional: -appetite change, -chills, -fatigue, -fever, -unexpected weight change  HENT: -ear pain, -trouble swallowing  Eyes: -pain, -discharge, -visual disturbance  Respiratory: -apnea, -cough, -shortness of breath, -wheezing  Cardiovascular: -chest pain, -leg swelling  Gastrointestinal: -abdominal pain, -blood in stool, -constipation,  -diarrhea, -nausea, -vomiting, -reflux  Endocrine: -cold intolerance, -heat intolerance, -polydipsia  Genitourinary: -bladder incontinence, -dysuria, -erectile dysfunction, +frequency, -hematuria, -testicular pain, -urgency, -nocturia  Musculoskeletal: -gait problem, -joint swelling, -myalgia  Neurological: -dizziness, -seizures, -numbness  Psychiatric/Behavioral: -agitation, -hallucinations, -nervous, -anxiety symptoms  Psychiatric: +memory problems  Allergic: +allergic reactions         No visits with results within 6 Month(s) from this visit.   Latest known visit with results is:   Telephone on 04/25/2024   Component Date Value Ref Range Status    Cholesterol 04/30/2024 155  <200 mg/dL Final    HDL 04/30/2024 86  > OR = 40 mg/dL Final    Triglycerides 04/30/2024 104  <150 mg/dL Final    LDL Cholesterol 04/30/2024 50  mg/dL (calc) Final    HDL/Cholesterol Ratio 04/30/2024 1.8  <5.0 (calc) Final    Non HDL Chol. (LDL+VLDL) 04/30/2024 69  <130 mg/dL (calc) Final    Glucose 04/30/2024 89  65 - 99 mg/dL Final    BUN 04/30/2024 18  7 - 25 mg/dL Final    Creatinine 04/30/2024 1.02  0.70 - 1.22 mg/dL Final    eGFR 04/30/2024 72  > OR = 60 mL/min/1.73m2 Final    BUN/Creatinine Ratio 04/30/2024 SEE NOTE:  6 - 22 (calc) Final    Sodium 04/30/2024 144  135 - 146 mmol/L Final    Potassium 04/30/2024 4.8  3.5 - 5.3 mmol/L Final    Chloride 04/30/2024 105  98 - 110 mmol/L Final    CO2 04/30/2024 31  20 - 32 mmol/L Final    Calcium 04/30/2024 9.2  8.6 - 10.3 mg/dL Final    Total Protein 04/30/2024 6.3  6.1 - 8.1 g/dL Final    Albumin 04/30/2024 4.3  3.6 - 5.1 g/dL Final    Globulin, Total 04/30/2024 2.0  1.9 - 3.7 g/dL (calc) Final    Albumin/Globulin Ratio 04/30/2024 2.2  1.0 - 2.5 (calc) Final    Total Bilirubin 04/30/2024 1.2  0.2 - 1.2 mg/dL Final    Alkaline Phosphatase 04/30/2024 70  35 - 144 U/L Final    AST 04/30/2024 22  10 - 35 U/L Final    ALT 04/30/2024 16  9 - 46 U/L Final    TSH w/reflex to FT4 04/30/2024 2.48   0.40 - 4.50 mIU/L Final    WBC 04/30/2024 4.3  3.8 - 10.8 Thousand/uL Final    RBC 04/30/2024 4.15 (L)  4.20 - 5.80 Million/uL Final    Hemoglobin 04/30/2024 15.0  13.2 - 17.1 g/dL Final    Hematocrit 04/30/2024 43.6  38.5 - 50.0 % Final    MCV 04/30/2024 105.1 (H)  80.0 - 100.0 fL Final    MCH 04/30/2024 36.1 (H)  27.0 - 33.0 pg Final    MCHC 04/30/2024 34.4  32.0 - 36.0 g/dL Final    RDW 04/30/2024 12.8  11.0 - 15.0 % Final    Platelets 04/30/2024 188  140 - 400 Thousand/uL Final    MPV 04/30/2024 10.1  7.5 - 12.5 fL Final    Neutrophils, Abs 04/30/2024 2,322  1,500 - 7,800 cells/uL Final    Lymph # 04/30/2024 1,303  850 - 3,900 cells/uL Final    Mono # 04/30/2024 486  200 - 950 cells/uL Final    Eos # 04/30/2024 151  15 - 500 cells/uL Final    Baso # 04/30/2024 39  0 - 200 cells/uL Final    Neutrophils Relative 04/30/2024 54  % Final    Lymph % 04/30/2024 30.3  % Final    Mono % 04/30/2024 11.3  % Final    Eosinophil % 04/30/2024 3.5  % Final    Basophil % 04/30/2024 0.9  % Final    PROSTATE SPECIFIC ANTIGEN, SCR - Q* 04/30/2024 0.54  < OR = 4.00 ng/mL Final       Past Medical History:   Diagnosis Date    Enlarged prostate     Hypertension     Kidney stone     Urinary tract infection      Social History     Socioeconomic History    Marital status:    Tobacco Use    Smoking status: Light Smoker     Types: Cigars    Smokeless tobacco: Never   Substance and Sexual Activity    Alcohol use: Yes     Comment: 2 glasses of wine nightly    Drug use: Never     Social Drivers of Health     Financial Resource Strain: Medium Risk (2/23/2024)    Overall Financial Resource Strain (CARDIA)     Difficulty of Paying Living Expenses: Somewhat hard   Food Insecurity: No Food Insecurity (2/23/2024)    Hunger Vital Sign     Worried About Running Out of Food in the Last Year: Never true     Ran Out of Food in the Last Year: Never true   Transportation Needs: No Transportation Needs (2/23/2024)    PRAPARE - Transportation      Lack of Transportation (Medical): No     Lack of Transportation (Non-Medical): No   Physical Activity: Insufficiently Active (2/23/2024)    Exercise Vital Sign     Days of Exercise per Week: 7 days     Minutes of Exercise per Session: 20 min   Stress: Stress Concern Present (2/23/2024)    [facility] - Occupational Stress Questionnaire     Feeling of Stress : To some extent   Housing Stability: Low Risk  (2/23/2024)    Housing Stability Vital Sign     Unable to Pay for Housing in the Last Year: No     Number of Places Lived in the Last Year: 1     Unstable Housing in the Last Year: No     Past Surgical History:   Procedure Laterality Date    COLONOSCOPY      EXTRACORPOREAL SHOCK WAVE LITHOTRIPSY Left 5/29/2019    Procedure: LITHOTRIPSY, ESWL;  Surgeon: Kobi Troncoso MD;  Location: [facility] OR;  Service: Urology;  Laterality: Left;    HIP SURGERY  1990    JOINT REPLACEMENT      PROSTATECTOMY  2017    TONSILLECTOMY       No family history on file.    The CVD Risk score (DAVID'Agostino, et al., 2008) failed to calculate for the following reasons:    The 2008 CVD risk score is only valid for ages 30 to 74    Tests to Keep You Healthy    Last Blood Pressure <= 139/89 (11/26/2024): Yes  Tobacco Cessation: NO      Review of patient's allergies indicates:  No Known Allergies    Current Outpatient Medications:     amLODIPine (NORVASC) 5 MG tablet, TK 1 T PO D, Disp: , Rfl: 3    carvediloL (COREG) 3.125 MG tablet, TK 1 T PO D Strength: 3.125 mg, Disp: 180 tablet, Rfl: 3    cloNIDine (CATAPRES) 0.1 MG tablet, See Instructions, 1 tablet daily as needed if systolic blood pressure [id number], # 90 tab, 3 Refill(s), Maintenance, Pharmacy: [facility] [id number] Strength: 0.1 mg, Disp: 90 tablet, Rfl: 1    donepeziL (ARICEPT) 5 MG tablet, Take 1 tablet (5 mg total) by mouth every evening., Disp: 90 tablet, Rfl: 3    glucosamine-chondroitin 500-400 mg tablet, Take 1 tablet by mouth 3 (three) times daily.,  Disp: , Rfl:     lisinopriL-hydrochlorothiazide (PRINZIDE,ZESTORETIC) 10-12.5 mg per tablet, Take 1 tablet by mouth once daily., Disp: 90 tablet, Rfl: 3    lovastatin (MEVACOR) 20 MG tablet, TK 1 T PO HS Strength: 20 mg, Disp: 90 tablet, Rfl: 3    magnesium 200 mg Tab, Take 500 mg by mouth once., Disp: , Rfl:     potassium chloride (MICRO-K) 10 MEQ CpSR, See Instructions, TAKE 2 TABLETS TWICE DAILY (DOSE CHANGE), # 360 tab, 0 Refill(s), Pharmacy: [facility], 182, cm, 04/12/21 9:23:00 CDT, Height/Length Measured, 84.7, kg, 04/12/21 9:23:00 CDT, Weight Dosing, Disp: 360 capsule, Rfl: 3    QUEtiapine (SEROQUEL) 25 MG Tab, Take 1 tablet (25 mg total) by mouth daily as needed (agitation)., Disp: 30 tablet, Rfl: 3    traZODone (DESYREL) 50 MG tablet, Take 1 tablet (50 mg total) by mouth every evening., Disp: 90 tablet, Rfl: 3    potassium chloride (KLOR-CON) 10 MEQ TbSR, TK 2 TS PO BID Strength: 10 mEq, Disp: 360 tablet, Rfl: 1    tamsulosin (FLOMAX) 0.4 mg Cap, Take 2 capsules (0.8 mg total) by mouth once daily., Disp: 180 capsule, Rfl: 3    vibegron (GEMTESA) 75 mg Tab, Take 1 tablet (75 mg total) by mouth every evening., Disp: 30 tablet, Rfl: 2    Review of Systems   Constitutional:  Negative for appetite change, chills, fatigue, fever and unexpected weight change.   HENT:  Negative for ear pain and trouble swallowing.    Eyes:  Negative for pain, discharge and visual disturbance.   Respiratory:  Negative for apnea, cough, shortness of breath and wheezing.    Cardiovascular:  Negative for chest pain and leg swelling.   Gastrointestinal:  Negative for abdominal pain, blood in stool, constipation, diarrhea, nausea, vomiting and reflux.   Endocrine: Negative for cold intolerance, heat intolerance and polydipsia.   Genitourinary:  Negative for bladder incontinence, dysuria, erectile dysfunction, frequency, hematuria, testicular pain and urgency.        He has nocturia 3 times per night or more.  "  Musculoskeletal:  Negative for gait problem, joint swelling and myalgias.   Neurological:  Negative for dizziness, seizures and numbness.   Psychiatric/Behavioral:  Positive for agitation (Has some sundowning when wife is not at home and  the sun sets.), confusion and decreased concentration. Negative for behavioral problems and hallucinations. The patient is not nervous/anxious.            Objective:      Vitals:    11/26/24 1539   BP: 104/60   Pulse: 60   SpO2: 98%   Weight: 75.8 kg (167 lb)   Height: 5' 11" (1.803 m)     Physical Exam  Vitals and nursing note reviewed.   Constitutional:       General: He is not in acute distress.     Appearance: Normal appearance. He is well-developed. He is not toxic-appearing.   HENT:      Head: Normocephalic and atraumatic.      Right Ear: Tympanic membrane and external ear normal.      Left Ear: Tympanic membrane and external ear normal.      Nose: Nose normal.      Mouth/Throat:      Pharynx: Oropharynx is clear. No posterior oropharyngeal erythema.   Eyes:      Pupils: Pupils are equal, round, and reactive to light.   Neck:      Thyroid: No thyromegaly.      Vascular: No carotid bruit.   Cardiovascular:      Rate and Rhythm: Normal rate and regular rhythm.      Heart sounds: Normal heart sounds. No murmur heard.  Pulmonary:      Effort: Pulmonary effort is normal.      Breath sounds: Normal breath sounds. No wheezing or rales.   Abdominal:      General: Bowel sounds are normal. There is no distension.      Palpations: Abdomen is soft.      Tenderness: There is no abdominal tenderness.   Musculoskeletal:         General: No tenderness or deformity. Normal range of motion.      Cervical back: Normal range of motion and neck supple.      Lumbar back: Normal. No spasms.      Comments: Bends 90 degrees at  waist, shoulders and knees have full range of motion, no pitting edema to lower extremities   Lymphadenopathy:      Cervical: No cervical adenopathy.   Skin:     General: " Skin is warm and dry.      Findings: No rash.   Neurological:      General: No focal deficit present.      Mental Status: He is alert and oriented to person, place, and time. Mental status is at baseline.      Cranial Nerves: No cranial nerve deficit.      Coordination: Coordination normal.   Psychiatric:         Mood and Affect: Mood normal.         Behavior: Behavior normal.         Thought Content: Thought content normal.         Cognition and Memory: Cognition is impaired. Memory is impaired. He exhibits impaired recent memory.         Judgment: Judgment normal.       Physical Exam    Vitals: Blood pressure: 120/70-something.             Assessment:       1. Mild late onset Alzheimer's dementia without behavioral disturbance, psychotic disturbance, mood disturbance, or anxiety    2. Other symptoms and signs involving cognitive functions and awareness    3. Reactive depression    4. PVC's (premature ventricular contractions)    5. Mixed hyperlipidemia    6. Infrarenal abdominal aortic aneurysm (AAA) without rupture    7. Hypertension, unspecified type    8. Abdominal aortic aneurysm (AAA) without rupture, unspecified part    9. Kidney stone on left side    10. Benign prostatic hyperplasia without lower urinary tract symptoms    11. Primary insomnia         Plan:       Mild late onset Alzheimer's dementia without behavioral disturbance, psychotic disturbance, mood disturbance, or anxiety  Patient remains active physically and walks the dog 5 times a day.  Neuropsych has seen him and suggested decreasing Seroquel down to 25 mg HS.  (50 mg was too sedating)  Other symptoms and signs involving cognitive functions and awareness    Reactive depression  Does not seem depressed, enjoys painting and drawing  PVC's (premature ventricular contractions)    Mixed hyperlipidemia    Infrarenal abdominal aortic aneurysm (AAA) without rupture  Asymptomatic  Hypertension, unspecified type  Blood pressure well controlled  Abdominal  aortic aneurysm (AAA) without rupture, unspecified part  Asymptomatic  Kidney stone on left side  Asymptomatic  Benign prostatic hyperplasia without lower urinary tract symptoms  -     tamsulosin (FLOMAX) 0.4 mg Cap; Take 2 capsules (0.8 mg total) by mouth once daily.  Dispense: 180 capsule; Refill: 3  Refill tamsulosin, will add Gemtesa 75 mg daily for nocturia  Primary insomnia  Trazodone 50 mg helps him sleep  Other orders  -     vibegron (GEMTESA) 75 mg Tab; Take 1 tablet (75 mg total) by mouth every evening.  Dispense: 30 tablet; Refill: 2  Trial of Gemtesa    Assessment & Plan    Assessed cognitive function and memory, noting increased forgetfulness  Evaluated effectiveness and side effects of Seroquel (50mg), determining dose was too high  Considered prostate issues as likely cause of frequent nighttime urination  Reviewed blood pressure, finding it appropriately low without concerning symptoms  Noted weight gain since reducing dementia medication, viewed as positive given previous issues with medication-induced diarrhea and weight loss  Acknowledged neuropsychologist's recommendation for lower Seroquel dose (12.5mg)    BENIGN PROSTATIC HYPERPLASIA AND LOWER URINARY TRACT SYMPTOMS:  - Explained that 2 nighttime urinations are normal, 3 is borderline, and 4-5 indicates a potential issue.  - Started Gemtesa at night to calm bladder and reduce frequent urination.  - Continued tamsulosin for prostate/urinary symptoms, provided 3-month supply.  - Refilled tamsulosin at [facility].    ALCOHOL USE:  - Nino to limit alcohol consumption to 2 glasses of wine per evening.    PSYCHOTIC DISTURBANCE AND MOOD MANAGEMENT:  - Decreased Seroquel to 12.5mg (half of a 25mg tablet) as needed, to be stored at a low dose.    GENERAL HEALTH AND LIFESTYLE RECOMMENDATIONS:  - Discussed that low blood pressure without symptoms is preferable to elevated blood pressure.  - Nino to continue current level of physical activity,  including frequent walks on property.  - Recommend maintaining engagement in artistic pursuits, particularly oil painting.    FOLLOW-UP:  - Follow up in 4 months.         Follow up in about 4 months (around 3/26/2025), or dementia BPH.        This note was generated with the assistance of ambient listening technology. Verbal consent was obtained by the patient and accompanying visitor(s) for the recording of patient appointment to facilitate this note. I attest to having reviewed and edited the generated note for accuracy, though some syntax or spelling errors may persist. Please contact the author of this note for any clarification.      11/26/2024 Yonas Alaniz

## 2024-11-29 ENCOUNTER — PATIENT MESSAGE (OUTPATIENT)
Dept: FAMILY MEDICINE | Facility: CLINIC | Age: 84
End: 2024-11-29
Payer: MEDICARE

## 2025-01-04 ENCOUNTER — PATIENT MESSAGE (OUTPATIENT)
Dept: NEUROLOGY | Facility: CLINIC | Age: 85
End: 2025-01-04
Payer: MEDICARE

## 2025-02-22 DIAGNOSIS — Z00.00 ENCOUNTER FOR MEDICARE ANNUAL WELLNESS EXAM: ICD-10-CM

## 2025-03-11 ENCOUNTER — TELEPHONE (OUTPATIENT)
Dept: FAMILY MEDICINE | Facility: CLINIC | Age: 85
End: 2025-03-11
Payer: MEDICARE

## 2025-03-11 DIAGNOSIS — E78.2 MIXED HYPERLIPIDEMIA: ICD-10-CM

## 2025-03-11 DIAGNOSIS — I10 HYPERTENSION, UNSPECIFIED TYPE: ICD-10-CM

## 2025-03-11 DIAGNOSIS — Z79.899 ENCOUNTER FOR LONG-TERM (CURRENT) USE OF MEDICATIONS: Primary | ICD-10-CM

## 2025-03-11 DIAGNOSIS — I49.9 CARDIAC ARRHYTHMIA, UNSPECIFIED CARDIAC ARRHYTHMIA TYPE: ICD-10-CM

## 2025-03-26 ENCOUNTER — OFFICE VISIT (OUTPATIENT)
Dept: FAMILY MEDICINE | Facility: CLINIC | Age: 85
End: 2025-03-26
Payer: MEDICARE

## 2025-03-26 VITALS
DIASTOLIC BLOOD PRESSURE: 60 MMHG | OXYGEN SATURATION: 98 % | WEIGHT: 165 LBS | HEIGHT: 71 IN | SYSTOLIC BLOOD PRESSURE: 116 MMHG | HEART RATE: 80 BPM | BODY MASS INDEX: 23.1 KG/M2

## 2025-03-26 DIAGNOSIS — F51.01 PRIMARY INSOMNIA: ICD-10-CM

## 2025-03-26 DIAGNOSIS — G30.1 MILD LATE ONSET ALZHEIMER'S DEMENTIA WITHOUT BEHAVIORAL DISTURBANCE, PSYCHOTIC DISTURBANCE, MOOD DISTURBANCE, OR ANXIETY: Primary | ICD-10-CM

## 2025-03-26 DIAGNOSIS — F32.9 REACTIVE DEPRESSION: ICD-10-CM

## 2025-03-26 DIAGNOSIS — I71.40 ABDOMINAL AORTIC ANEURYSM (AAA) WITHOUT RUPTURE, UNSPECIFIED PART: ICD-10-CM

## 2025-03-26 DIAGNOSIS — E78.2 MIXED HYPERLIPIDEMIA: ICD-10-CM

## 2025-03-26 DIAGNOSIS — N32.81 OAB (OVERACTIVE BLADDER): ICD-10-CM

## 2025-03-26 DIAGNOSIS — F02.A0 MILD LATE ONSET ALZHEIMER'S DEMENTIA WITHOUT BEHAVIORAL DISTURBANCE, PSYCHOTIC DISTURBANCE, MOOD DISTURBANCE, OR ANXIETY: Primary | ICD-10-CM

## 2025-03-26 DIAGNOSIS — N40.0 BENIGN PROSTATIC HYPERPLASIA WITHOUT LOWER URINARY TRACT SYMPTOMS: ICD-10-CM

## 2025-03-26 DIAGNOSIS — R41.89 OTHER SYMPTOMS AND SIGNS INVOLVING COGNITIVE FUNCTIONS AND AWARENESS: ICD-10-CM

## 2025-03-26 DIAGNOSIS — I71.43 INFRARENAL ABDOMINAL AORTIC ANEURYSM (AAA) WITHOUT RUPTURE: ICD-10-CM

## 2025-03-26 DIAGNOSIS — I10 HYPERTENSION, UNSPECIFIED TYPE: ICD-10-CM

## 2025-03-26 PROBLEM — N20.0 KIDNEY STONE ON LEFT SIDE: Status: RESOLVED | Noted: 2019-05-06 | Resolved: 2025-03-26

## 2025-03-26 PROCEDURE — 3074F SYST BP LT 130 MM HG: CPT | Mod: CPTII,S$GLB,, | Performed by: FAMILY MEDICINE

## 2025-03-26 PROCEDURE — 99214 OFFICE O/P EST MOD 30 MIN: CPT | Mod: S$GLB,,, | Performed by: FAMILY MEDICINE

## 2025-03-26 PROCEDURE — 1101F PT FALLS ASSESS-DOCD LE1/YR: CPT | Mod: CPTII,S$GLB,, | Performed by: FAMILY MEDICINE

## 2025-03-26 PROCEDURE — 3078F DIAST BP <80 MM HG: CPT | Mod: CPTII,S$GLB,, | Performed by: FAMILY MEDICINE

## 2025-03-26 PROCEDURE — 3288F FALL RISK ASSESSMENT DOCD: CPT | Mod: CPTII,S$GLB,, | Performed by: FAMILY MEDICINE

## 2025-03-26 PROCEDURE — 1159F MED LIST DOCD IN RCRD: CPT | Mod: CPTII,S$GLB,, | Performed by: FAMILY MEDICINE

## 2025-03-26 RX ORDER — OXYBUTYNIN CHLORIDE 5 MG/1
5 TABLET ORAL 2 TIMES DAILY
Qty: 180 TABLET | Refills: 1 | Status: SHIPPED | OUTPATIENT
Start: 2025-03-26 | End: 2025-03-28 | Stop reason: SDUPTHER
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
## 2025-03-27 ENCOUNTER — PATIENT MESSAGE (OUTPATIENT)
Dept: FAMILY MEDICINE | Facility: CLINIC | Age: 85
End: 2025-03-27
Payer: MEDICARE

## 2025-03-27 DIAGNOSIS — N32.81 OAB (OVERACTIVE BLADDER): ICD-10-CM

## 2025-03-27 DIAGNOSIS — E78.2 MIXED HYPERLIPIDEMIA: ICD-10-CM

## 2025-03-27 DIAGNOSIS — I10 HYPERTENSION, UNSPECIFIED TYPE: ICD-10-CM

## 2025-03-28 RX ORDER — POTASSIUM CHLORIDE 750 MG/1
CAPSULE, EXTENDED RELEASE ORAL
Qty: 360 CAPSULE | Refills: 3 | Status: SHIPPED | OUTPATIENT
Start: 2025-03-28

## 2025-03-28 RX ORDER — LOVASTATIN 20 MG/1
TABLET ORAL
Qty: 90 TABLET | Refills: 3 | Status: SHIPPED | OUTPATIENT
Start: 2025-03-28

## 2025-03-28 RX ORDER — OXYBUTYNIN CHLORIDE 5 MG/1
5 TABLET ORAL 2 TIMES DAILY
Qty: 180 TABLET | Refills: 1 | Status: SHIPPED | OUTPATIENT
Start: 2025-03-28 | End: 2026-03-28

## 2025-03-28 RX ORDER — LISINOPRIL AND HYDROCHLOROTHIAZIDE 10; 12.5 MG/1; MG/1
1 TABLET ORAL DAILY
Qty: 90 TABLET | Refills: 3 | Status: SHIPPED | OUTPATIENT
Start: 2025-03-28

## 2025-03-28 RX ORDER — CARVEDILOL 3.12 MG/1
TABLET ORAL
Qty: 180 TABLET | Refills: 3 | Status: SHIPPED | OUTPATIENT
Start: 2025-03-28

## 2025-04-01 ENCOUNTER — PATIENT MESSAGE (OUTPATIENT)
Dept: FAMILY MEDICINE | Facility: CLINIC | Age: 85
End: 2025-04-01
Payer: MEDICARE

## 2025-08-04 DIAGNOSIS — R41.3 MEMORY LOSS: ICD-10-CM

## 2025-08-04 RX ORDER — DONEPEZIL HYDROCHLORIDE 5 MG/1
5 TABLET, FILM COATED ORAL NIGHTLY
Qty: 90 TABLET | Refills: 0 | Status: SHIPPED | OUTPATIENT
Start: 2025-08-04

## 2025-08-25 ENCOUNTER — PATIENT MESSAGE (OUTPATIENT)
Dept: FAMILY MEDICINE | Facility: CLINIC | Age: 85
End: 2025-08-25
Payer: MEDICARE

## 2025-08-26 RX ORDER — AMLODIPINE BESYLATE 5 MG/1
5 TABLET ORAL DAILY
Qty: 90 TABLET | Refills: 1 | Status: SHIPPED | OUTPATIENT
Start: 2025-08-26

## 2025-09-04 ENCOUNTER — PATIENT MESSAGE (OUTPATIENT)
Dept: FAMILY MEDICINE | Facility: CLINIC | Age: 85
End: 2025-09-04
Payer: MEDICARE

## 2025-09-04 DIAGNOSIS — F51.01 PRIMARY INSOMNIA: ICD-10-CM

## 2025-09-04 RX ORDER — TRAZODONE HYDROCHLORIDE 50 MG/1
50 TABLET ORAL NIGHTLY
Qty: 90 TABLET | Refills: 3 | Status: SHIPPED | OUTPATIENT
Start: 2025-09-04 | End: 2026-09-04